# Patient Record
Sex: FEMALE | Race: BLACK OR AFRICAN AMERICAN | NOT HISPANIC OR LATINO | Employment: OTHER | URBAN - METROPOLITAN AREA
[De-identification: names, ages, dates, MRNs, and addresses within clinical notes are randomized per-mention and may not be internally consistent; named-entity substitution may affect disease eponyms.]

---

## 2021-08-13 ENCOUNTER — APPOINTMENT (OUTPATIENT)
Dept: GENERAL RADIOLOGY | Facility: HOSPITAL | Age: 71
End: 2021-08-13

## 2021-08-13 ENCOUNTER — HOSPITAL ENCOUNTER (EMERGENCY)
Facility: HOSPITAL | Age: 71
Discharge: HOME | End: 2021-08-13
Attending: EMERGENCY MEDICINE | Admitting: EMERGENCY MEDICINE

## 2021-08-13 VITALS
SYSTOLIC BLOOD PRESSURE: 133 MMHG | OXYGEN SATURATION: 94 % | WEIGHT: 185.85 LBS | HEART RATE: 74 BPM | TEMPERATURE: 97.3 F | BODY MASS INDEX: 29.17 KG/M2 | HEIGHT: 67 IN | RESPIRATION RATE: 18 BRPM | DIASTOLIC BLOOD PRESSURE: 74 MMHG

## 2021-08-13 DIAGNOSIS — M47.816 FACET ARTHROPATHY, LUMBAR: ICD-10-CM

## 2021-08-13 DIAGNOSIS — M15.9 PRIMARY OSTEOARTHRITIS INVOLVING MULTIPLE JOINTS: Primary | ICD-10-CM

## 2021-08-13 DIAGNOSIS — G89.29 CHRONIC LEFT HIP PAIN: ICD-10-CM

## 2021-08-13 DIAGNOSIS — G89.29 CHRONIC BILATERAL LOW BACK PAIN WITHOUT SCIATICA: ICD-10-CM

## 2021-08-13 DIAGNOSIS — M16.0 PRIMARY OSTEOARTHRITIS OF BOTH HIPS: ICD-10-CM

## 2021-08-13 DIAGNOSIS — M54.50 CHRONIC BILATERAL LOW BACK PAIN WITHOUT SCIATICA: ICD-10-CM

## 2021-08-13 DIAGNOSIS — M25.552 CHRONIC LEFT HIP PAIN: ICD-10-CM

## 2021-08-13 LAB
ALBUMIN SERPL-MCNC: 4.5 G/DL (ref 3.5–5.2)
ALBUMIN/GLOB SERPL: 1.3 G/DL
ALP SERPL-CCNC: 78 U/L (ref 39–117)
ALT SERPL W P-5'-P-CCNC: 14 U/L (ref 1–33)
ANION GAP SERPL CALCULATED.3IONS-SCNC: 11.9 MMOL/L (ref 5–15)
AST SERPL-CCNC: 17 U/L (ref 1–32)
BASOPHILS # BLD AUTO: 0.04 10*3/MM3 (ref 0–0.2)
BASOPHILS NFR BLD AUTO: 0.6 % (ref 0–1.5)
BILIRUB SERPL-MCNC: 0.3 MG/DL (ref 0–1.2)
BILIRUB UR QL STRIP: NEGATIVE
BUN SERPL-MCNC: 11 MG/DL (ref 8–23)
BUN/CREAT SERPL: 14.5 (ref 7–25)
CALCIUM SPEC-SCNC: 9.7 MG/DL (ref 8.6–10.5)
CHLORIDE SERPL-SCNC: 107 MMOL/L (ref 98–107)
CLARITY UR: CLEAR
CO2 SERPL-SCNC: 25.1 MMOL/L (ref 22–29)
COLOR UR: YELLOW
CREAT SERPL-MCNC: 0.76 MG/DL (ref 0.57–1)
DEPRECATED RDW RBC AUTO: 46.5 FL (ref 37–54)
EOSINOPHIL # BLD AUTO: 0.15 10*3/MM3 (ref 0–0.4)
EOSINOPHIL NFR BLD AUTO: 2.2 % (ref 0.3–6.2)
ERYTHROCYTE [DISTWIDTH] IN BLOOD BY AUTOMATED COUNT: 15.5 % (ref 12.3–15.4)
GFR SERPL CREATININE-BSD FRML MDRD: 91 ML/MIN/1.73
GLOBULIN UR ELPH-MCNC: 3.4 GM/DL
GLUCOSE SERPL-MCNC: 84 MG/DL (ref 65–99)
GLUCOSE UR STRIP-MCNC: NEGATIVE MG/DL
HCT VFR BLD AUTO: 42.7 % (ref 34–46.6)
HGB BLD-MCNC: 13.9 G/DL (ref 12–15.9)
HGB UR QL STRIP.AUTO: NEGATIVE
IMM GRANULOCYTES # BLD AUTO: 0.01 10*3/MM3 (ref 0–0.05)
IMM GRANULOCYTES NFR BLD AUTO: 0.1 % (ref 0–0.5)
KETONES UR QL STRIP: NEGATIVE
LEUKOCYTE ESTERASE UR QL STRIP.AUTO: NEGATIVE
LYMPHOCYTES # BLD AUTO: 2.68 10*3/MM3 (ref 0.7–3.1)
LYMPHOCYTES NFR BLD AUTO: 39.6 % (ref 19.6–45.3)
MCH RBC QN AUTO: 27 PG (ref 26.6–33)
MCHC RBC AUTO-ENTMCNC: 32.6 G/DL (ref 31.5–35.7)
MCV RBC AUTO: 83.1 FL (ref 79–97)
MONOCYTES # BLD AUTO: 0.53 10*3/MM3 (ref 0.1–0.9)
MONOCYTES NFR BLD AUTO: 7.8 % (ref 5–12)
NEUTROPHILS NFR BLD AUTO: 3.35 10*3/MM3 (ref 1.7–7)
NEUTROPHILS NFR BLD AUTO: 49.7 % (ref 42.7–76)
NITRITE UR QL STRIP: NEGATIVE
NRBC BLD AUTO-RTO: 0 /100 WBC (ref 0–0.2)
PH UR STRIP.AUTO: 7 [PH] (ref 5–8)
PLATELET # BLD AUTO: 239 10*3/MM3 (ref 140–450)
PMV BLD AUTO: 11.9 FL (ref 6–12)
POTASSIUM SERPL-SCNC: 3.5 MMOL/L (ref 3.5–5.2)
PROT SERPL-MCNC: 7.9 G/DL (ref 6–8.5)
PROT UR QL STRIP: NEGATIVE
RBC # BLD AUTO: 5.14 10*6/MM3 (ref 3.77–5.28)
SODIUM SERPL-SCNC: 144 MMOL/L (ref 136–145)
SP GR UR STRIP: 1.01 (ref 1–1.03)
UROBILINOGEN UR QL STRIP: NORMAL
WBC # BLD AUTO: 6.76 10*3/MM3 (ref 3.4–10.8)

## 2021-08-13 PROCEDURE — 80053 COMPREHEN METABOLIC PANEL: CPT | Performed by: EMERGENCY MEDICINE

## 2021-08-13 PROCEDURE — 73562 X-RAY EXAM OF KNEE 3: CPT

## 2021-08-13 PROCEDURE — 99283 EMERGENCY DEPT VISIT LOW MDM: CPT

## 2021-08-13 PROCEDURE — 96361 HYDRATE IV INFUSION ADD-ON: CPT

## 2021-08-13 PROCEDURE — 72110 X-RAY EXAM L-2 SPINE 4/>VWS: CPT

## 2021-08-13 PROCEDURE — 81003 URINALYSIS AUTO W/O SCOPE: CPT | Performed by: NURSE PRACTITIONER

## 2021-08-13 PROCEDURE — 71045 X-RAY EXAM CHEST 1 VIEW: CPT

## 2021-08-13 PROCEDURE — 85025 COMPLETE CBC W/AUTO DIFF WBC: CPT | Performed by: EMERGENCY MEDICINE

## 2021-08-13 PROCEDURE — 73502 X-RAY EXAM HIP UNI 2-3 VIEWS: CPT

## 2021-08-13 PROCEDURE — 96375 TX/PRO/DX INJ NEW DRUG ADDON: CPT

## 2021-08-13 PROCEDURE — 96374 THER/PROPH/DIAG INJ IV PUSH: CPT

## 2021-08-13 PROCEDURE — 25010000002 KETOROLAC TROMETHAMINE PER 15 MG: Performed by: EMERGENCY MEDICINE

## 2021-08-13 PROCEDURE — 25010000002 DEXAMETHASONE PER 1 MG: Performed by: NURSE PRACTITIONER

## 2021-08-13 RX ORDER — SODIUM CHLORIDE 0.9 % (FLUSH) 0.9 %
10 SYRINGE (ML) INJECTION AS NEEDED
Status: DISCONTINUED | OUTPATIENT
Start: 2021-08-13 | End: 2021-08-13 | Stop reason: HOSPADM

## 2021-08-13 RX ORDER — ASCORBIC ACID 500 MG
500 TABLET ORAL
COMMUNITY

## 2021-08-13 RX ORDER — ONDANSETRON 2 MG/ML
4 INJECTION INTRAMUSCULAR; INTRAVENOUS ONCE
Status: DISCONTINUED | OUTPATIENT
Start: 2021-08-13 | End: 2021-08-13 | Stop reason: HOSPADM

## 2021-08-13 RX ORDER — PREDNISONE 20 MG/1
TABLET ORAL
Qty: 18 TABLET | Refills: 0 | Status: SHIPPED | OUTPATIENT
Start: 2021-08-13 | End: 2021-08-22

## 2021-08-13 RX ORDER — DIPHENOXYLATE HYDROCHLORIDE AND ATROPINE SULFATE 2.5; .025 MG/1; MG/1
1 TABLET ORAL
COMMUNITY

## 2021-08-13 RX ORDER — DEXAMETHASONE SODIUM PHOSPHATE 10 MG/ML
10 INJECTION INTRAMUSCULAR; INTRAVENOUS ONCE
Status: COMPLETED | OUTPATIENT
Start: 2021-08-13 | End: 2021-08-13

## 2021-08-13 RX ORDER — KETOROLAC TROMETHAMINE 15 MG/ML
15 INJECTION, SOLUTION INTRAMUSCULAR; INTRAVENOUS ONCE
Status: COMPLETED | OUTPATIENT
Start: 2021-08-13 | End: 2021-08-13

## 2021-08-13 RX ADMIN — DEXAMETHASONE SODIUM PHOSPHATE 10 MG: 10 INJECTION INTRAMUSCULAR; INTRAVENOUS at 14:59

## 2021-08-13 RX ADMIN — KETOROLAC TROMETHAMINE 15 MG: 15 INJECTION, SOLUTION INTRAMUSCULAR; INTRAVENOUS at 15:00

## 2021-08-13 RX ADMIN — SODIUM CHLORIDE 1000 ML: 9 INJECTION, SOLUTION INTRAVENOUS at 13:15

## 2021-08-13 NOTE — ED NOTES
"Pt refused pain medications at this time. States \"I was in a bad position during those pictures and I really don't want to take them if I don't have to.\"      Donna Field RN  08/13/21 3334    "

## 2021-08-13 NOTE — ED PROVIDER NOTES
Subjective   Pt complaining of worsening lower back pain and left hip pain and bilateral knee swelling and pain. She is here visiting with her daughter from [place], but states she is not going back. She takes no medications.      History provided by:  Patient   used: No        Review of Systems   Constitutional: Negative.    HENT: Negative.    Eyes: Negative.    Respiratory: Negative.    Cardiovascular: Negative.    Gastrointestinal: Negative.    Endocrine: Negative.    Genitourinary: Negative.    Musculoskeletal: Positive for arthralgias, back pain and joint swelling.   Skin: Negative.    Allergic/Immunologic: Negative.    Neurological: Negative.    Hematological: Negative.    Psychiatric/Behavioral: Negative.        Past Medical History:   Diagnosis Date   • Colitis        No Known Allergies    History reviewed. No pertinent surgical history.    History reviewed. No pertinent family history.    Social History     Socioeconomic History   • Marital status:      Spouse name: Not on file   • Number of children: Not on file   • Years of education: Not on file   • Highest education level: Not on file   Tobacco Use   • Smoking status: Never Smoker   • Smokeless tobacco: Never Used   Vaping Use   • Vaping Use: Never used   Substance and Sexual Activity   • Alcohol use: Not Currently   • Drug use: Not Currently           Objective   Physical Exam  Constitutional:       Appearance: Normal appearance.   HENT:      Head: Normocephalic and atraumatic.      Nose: Nose normal.      Mouth/Throat:      Mouth: Mucous membranes are moist.   Eyes:      Pupils: Pupils are equal, round, and reactive to light.   Cardiovascular:      Rate and Rhythm: Normal rate and regular rhythm.      Pulses: Normal pulses.   Pulmonary:      Effort: Pulmonary effort is normal.      Breath sounds: Normal breath sounds.   Abdominal:      General: Abdomen is flat. Bowel sounds are normal.      Palpations: Abdomen is soft.    Musculoskeletal:      Cervical back: Normal range of motion.        Back:         Legs:    Skin:     General: Skin is warm and dry.      Capillary Refill: Capillary refill takes less than 2 seconds.   Neurological:      General: No focal deficit present.      Mental Status: She is alert and oriented to person, place, and time. Mental status is at baseline.   Psychiatric:         Mood and Affect: Mood normal.         Procedures           ED Course  ED Course as of Aug 13 1831   Fri Aug 13, 2021   1447 XR Spine Lumbar Complete 4+VW [TP]      ED Course User Index  [TP] Donna Card APRN                                           MDM  Number of Diagnoses or Management Options  Chronic bilateral low back pain without sciatica: minor  Chronic left hip pain: minor  Facet arthropathy, lumbar: minor  Primary osteoarthritis involving multiple joints: minor  Primary osteoarthritis of both hips: minor     Amount and/or Complexity of Data Reviewed  Clinical lab tests: reviewed and ordered  Tests in the radiology section of CPT®: reviewed and ordered    Risk of Complications, Morbidity, and/or Mortality  Presenting problems: low  Diagnostic procedures: low  Management options: low    Patient Progress  Patient progress: stable      Final diagnoses:   Primary osteoarthritis involving multiple joints   Primary osteoarthritis of both hips   Facet arthropathy, lumbar   Chronic left hip pain   Chronic bilateral low back pain without sciatica       ED Disposition  ED Disposition     ED Disposition Condition Comment    Discharge Stable           Provider, No Known  [facility]  [place] [address]      As needed         Medication List      New Prescriptions    predniSONE 20 MG tablet  Commonly known as: DELTASONE  Take 1 tablet by mouth 3 (Three) Times a Day With Meals for 3 days, THEN 1 tablet 2 (Two) Times a Day for 3 days, THEN 1 tablet Daily for 3 days.  Start taking on: August 13, 2021           Where to Get  Your Medications      These medications were sent to [facility] [id number] - [place], [place] - [address] [facility] - [phone]  - [phone]  [address] [facility] [place] [address]    Phone: [phone]   · predniSONE 20 MG tablet          Donna Card, APRN  08/13/21 0626

## 2021-10-07 ENCOUNTER — HOSPITAL ENCOUNTER (OUTPATIENT)
Dept: GENERAL RADIOLOGY | Facility: HOSPITAL | Age: 71
Discharge: HOME | End: 2021-10-07
Admitting: NURSE PRACTITIONER

## 2021-10-07 ENCOUNTER — OFFICE VISIT (OUTPATIENT)
Dept: INTERNAL MEDICINE | Facility: CLINIC | Age: 71
End: 2021-10-07

## 2021-10-07 VITALS
TEMPERATURE: 97.1 F | WEIGHT: 184.38 LBS | DIASTOLIC BLOOD PRESSURE: 96 MMHG | SYSTOLIC BLOOD PRESSURE: 142 MMHG | HEART RATE: 107 BPM | HEIGHT: 67 IN | BODY MASS INDEX: 28.94 KG/M2 | OXYGEN SATURATION: 98 %

## 2021-10-07 DIAGNOSIS — K51.919 ULCERATIVE COLITIS WITH COMPLICATION, UNSPECIFIED LOCATION (HCC): ICD-10-CM

## 2021-10-07 DIAGNOSIS — Z12.11 COLON CANCER SCREENING: ICD-10-CM

## 2021-10-07 DIAGNOSIS — Z13.220 SCREENING FOR LIPID DISORDERS: ICD-10-CM

## 2021-10-07 DIAGNOSIS — Z12.31 ENCOUNTER FOR SCREENING MAMMOGRAM FOR MALIGNANT NEOPLASM OF BREAST: ICD-10-CM

## 2021-10-07 DIAGNOSIS — E66.3 OVERWEIGHT (BMI 25.0-29.9): Chronic | ICD-10-CM

## 2021-10-07 DIAGNOSIS — M25.50 ARTHRALGIA, UNSPECIFIED JOINT: ICD-10-CM

## 2021-10-07 DIAGNOSIS — M25.531 RIGHT WRIST PAIN: ICD-10-CM

## 2021-10-07 DIAGNOSIS — Z12.73 SCREENING FOR OVARIAN CANCER: ICD-10-CM

## 2021-10-07 DIAGNOSIS — I10 PRIMARY HYPERTENSION: Chronic | ICD-10-CM

## 2021-10-07 DIAGNOSIS — K51.919 ULCERATIVE COLITIS WITH COMPLICATION, UNSPECIFIED LOCATION (HCC): Primary | ICD-10-CM

## 2021-10-07 DIAGNOSIS — Z78.0 POSTMENOPAUSE: ICD-10-CM

## 2021-10-07 PROBLEM — K51.90 ULCERATIVE COLITIS: Chronic | Status: ACTIVE | Noted: 2019-08-27

## 2021-10-07 PROBLEM — K51.90 ULCERATIVE COLITIS (HCC): Status: ACTIVE | Noted: 2019-08-27

## 2021-10-07 LAB
ALBUMIN SERPL-MCNC: 4.5 G/DL (ref 3.5–5.2)
ALBUMIN/GLOB SERPL: 1.7 G/DL
ALP SERPL-CCNC: 74 U/L (ref 39–117)
ALT SERPL W P-5'-P-CCNC: 22 U/L (ref 1–33)
ANION GAP SERPL CALCULATED.3IONS-SCNC: 10.4 MMOL/L (ref 5–15)
AST SERPL-CCNC: 21 U/L (ref 1–32)
BASOPHILS # BLD MANUAL: 0.07 10*3/MM3 (ref 0–0.2)
BASOPHILS NFR BLD AUTO: 1 % (ref 0–1.5)
BILIRUB SERPL-MCNC: 0.3 MG/DL (ref 0–1.2)
BUN SERPL-MCNC: 12 MG/DL (ref 8–23)
BUN/CREAT SERPL: 14.6 (ref 7–25)
CALCIUM SPEC-SCNC: 9.4 MG/DL (ref 8.6–10.5)
CHLORIDE SERPL-SCNC: 107 MMOL/L (ref 98–107)
CHOLEST SERPL-MCNC: 264 MG/DL (ref 0–200)
CHROMATIN AB SERPL-ACNC: <10 IU/ML (ref 0–14)
CO2 SERPL-SCNC: 25.6 MMOL/L (ref 22–29)
CREAT SERPL-MCNC: 0.82 MG/DL (ref 0.57–1)
CRP SERPL-MCNC: <0.3 MG/DL (ref 0–0.5)
DEPRECATED RDW RBC AUTO: 42.8 FL (ref 37–54)
EOSINOPHIL # BLD MANUAL: 0.2 10*3/MM3 (ref 0–0.4)
EOSINOPHIL NFR BLD MANUAL: 3 % (ref 0.3–6.2)
ERYTHROCYTE [DISTWIDTH] IN BLOOD BY AUTOMATED COUNT: 14.4 % (ref 12.3–15.4)
ERYTHROCYTE [SEDIMENTATION RATE] IN BLOOD: 31 MM/HR (ref 0–30)
GFR SERPL CREATININE-BSD FRML MDRD: 83 ML/MIN/1.73
GLOBULIN UR ELPH-MCNC: 2.6 GM/DL
GLUCOSE SERPL-MCNC: 85 MG/DL (ref 65–99)
HCT VFR BLD AUTO: 43.9 % (ref 34–46.6)
HDLC SERPL-MCNC: 51 MG/DL (ref 40–60)
HGB BLD-MCNC: 14.3 G/DL (ref 12–15.9)
LDLC SERPL CALC-MCNC: 194 MG/DL (ref 0–100)
LDLC/HDLC SERPL: 3.76 {RATIO}
LYMPHOCYTES # BLD MANUAL: 2.95 10*3/MM3 (ref 0.7–3.1)
LYMPHOCYTES NFR BLD MANUAL: 44.4 % (ref 19.6–45.3)
LYMPHOCYTES NFR BLD MANUAL: 5.1 % (ref 5–12)
MCH RBC QN AUTO: 26.7 PG (ref 26.6–33)
MCHC RBC AUTO-ENTMCNC: 32.6 G/DL (ref 31.5–35.7)
MCV RBC AUTO: 82.1 FL (ref 79–97)
MONOCYTES # BLD AUTO: 0.34 10*3/MM3 (ref 0.1–0.9)
NEUTROPHILS # BLD AUTO: 3.09 10*3/MM3 (ref 1.7–7)
NEUTROPHILS NFR BLD MANUAL: 46.5 % (ref 42.7–76)
PLAT MORPH BLD: NORMAL
PLATELET # BLD AUTO: 148 10*3/MM3 (ref 140–450)
PMV BLD AUTO: 13.1 FL (ref 6–12)
POTASSIUM SERPL-SCNC: 3.8 MMOL/L (ref 3.5–5.2)
PROT SERPL-MCNC: 7.1 G/DL (ref 6–8.5)
RBC # BLD AUTO: 5.35 10*6/MM3 (ref 3.77–5.28)
RBC MORPH BLD: NORMAL
SODIUM SERPL-SCNC: 143 MMOL/L (ref 136–145)
TRIGL SERPL-MCNC: 106 MG/DL (ref 0–150)
TSH SERPL DL<=0.05 MIU/L-ACNC: 2.42 UIU/ML (ref 0.27–4.2)
VLDLC SERPL-MCNC: 19 MG/DL (ref 5–40)
WBC # BLD AUTO: 6.65 10*3/MM3 (ref 3.4–10.8)
WBC MORPH BLD: NORMAL

## 2021-10-07 PROCEDURE — 80053 COMPREHEN METABOLIC PANEL: CPT | Performed by: NURSE PRACTITIONER

## 2021-10-07 PROCEDURE — 36415 COLL VENOUS BLD VENIPUNCTURE: CPT | Performed by: NURSE PRACTITIONER

## 2021-10-07 PROCEDURE — 86140 C-REACTIVE PROTEIN: CPT | Performed by: NURSE PRACTITIONER

## 2021-10-07 PROCEDURE — 99204 OFFICE O/P NEW MOD 45 MIN: CPT | Performed by: NURSE PRACTITIONER

## 2021-10-07 PROCEDURE — 85007 BL SMEAR W/DIFF WBC COUNT: CPT | Performed by: NURSE PRACTITIONER

## 2021-10-07 PROCEDURE — 86038 ANTINUCLEAR ANTIBODIES: CPT | Performed by: NURSE PRACTITIONER

## 2021-10-07 PROCEDURE — 86225 DNA ANTIBODY NATIVE: CPT | Performed by: NURSE PRACTITIONER

## 2021-10-07 PROCEDURE — 85652 RBC SED RATE AUTOMATED: CPT | Performed by: NURSE PRACTITIONER

## 2021-10-07 PROCEDURE — 86431 RHEUMATOID FACTOR QUANT: CPT | Performed by: NURSE PRACTITIONER

## 2021-10-07 PROCEDURE — 73110 X-RAY EXAM OF WRIST: CPT

## 2021-10-07 PROCEDURE — 84443 ASSAY THYROID STIM HORMONE: CPT | Performed by: NURSE PRACTITIONER

## 2021-10-07 PROCEDURE — 85025 COMPLETE CBC W/AUTO DIFF WBC: CPT | Performed by: NURSE PRACTITIONER

## 2021-10-07 PROCEDURE — 80061 LIPID PANEL: CPT | Performed by: NURSE PRACTITIONER

## 2021-10-07 NOTE — PROGRESS NOTES
"Chief Complaint  Arthritis and Establish Care (Previous PCP: nathan obando in [place])    Subjective          Татьяна Jackson presents to [facility]  History of Present Illness  She states that she does not like to take medications. She prefers natural remedies only.   She reports having UC for many years. She was taking medications but quit about 15 yrs ago. She reports abdominal pain and bloating only at this time. She denies rectal bleeding.  Arthritis--right hand/thumb pain x2-3 mths, fall at that time, snuff box tenderness  She reports having issues with HTN a few yrs ago. She reports refusing medication at that time. She reports changing diet and drinking a mixture of juices which helped.  Denies chest pain, headache, dizziness  She also reports about a 60 pound weight gain over the last 15 years.  Objective   Vital Signs:   /96   Pulse 107   Temp 97.1 °F (36.2 °C) (Temporal)   Ht 170.2 cm (67\")   Wt 83.6 kg (184 lb 6 oz)   SpO2 98%   BMI 28.88 kg/m²     Physical Exam  Vitals and nursing note reviewed.   Constitutional:       General: She is not in acute distress.     Appearance: Normal appearance.   HENT:      Head: Normocephalic and atraumatic.      Right Ear: Tympanic membrane and external ear normal.      Left Ear: Tympanic membrane and external ear normal.      Nose: Nose normal.      Mouth/Throat:      Mouth: Mucous membranes are moist.   Eyes:      Conjunctiva/sclera: Conjunctivae normal.   Cardiovascular:      Rate and Rhythm: Normal rate and regular rhythm.      Pulses: Normal pulses.      Heart sounds: Normal heart sounds. No murmur heard.   No friction rub. No gallop.    Pulmonary:      Effort: Pulmonary effort is normal. No respiratory distress.      Breath sounds: No wheezing, rhonchi or rales.   Abdominal:      Palpations: Abdomen is soft.   Musculoskeletal:      Cervical back: Neck supple.      Right lower leg: No edema.      Left lower " leg: No edema.   Skin:     General: Skin is warm and dry.   Neurological:      General: No focal deficit present.      Mental Status: She is alert and oriented to person, place, and time.   Psychiatric:         Mood and Affect: Mood normal.         Behavior: Behavior normal.        Result Review :     CMP    CMP 8/13/21   Glucose 84   BUN 11   Creatinine 0.76   eGFR African Am 91   Sodium 144   Potassium 3.5   Chloride 107   Calcium 9.7   Albumin 4.50   Total Bilirubin 0.3   Alkaline Phosphatase 78   AST (SGOT) 17   ALT (SGPT) 14           CBC    CBC 8/13/21   WBC 6.76   RBC 5.14   Hemoglobin 13.9   Hematocrit 42.7   MCV 83.1   MCH 27.0   MCHC 32.6   RDW 15.5 (A)   Platelets 239   (A) Abnormal value                   Procedures      Assessment and Plan    Diagnoses and all orders for this visit:    1. Ulcerative colitis with complication, unspecified location (HCC) (Primary)  Assessment & Plan:  We will schedule with GI for further eval    Orders:  -     Ambulatory Referral to Gastroenterology  -     XR Wrist 3+ View Right; Future  -     Comprehensive Metabolic Panel  -     CBC & Differential  -     TSH  -     Lipid Panel  -     Sedimentation Rate  -     C-reactive Protein  -     Rheumatoid Factor  -     LEXI    2. Colon cancer screening  -     Ambulatory Referral to Gastroenterology  -     XR Wrist 3+ View Right; Future  -     Comprehensive Metabolic Panel  -     CBC & Differential  -     TSH  -     Lipid Panel  -     Sedimentation Rate  -     C-reactive Protein  -     Rheumatoid Factor  -     LEXI    3. Right wrist pain  -     Ambulatory Referral to Gastroenterology  -     XR Wrist 3+ View Right; Future  -     Comprehensive Metabolic Panel  -     CBC & Differential  -     TSH  -     Lipid Panel  -     Sedimentation Rate  -     C-reactive Protein  -     Rheumatoid Factor  -     LEXI    4. Arthralgia, unspecified joint  Assessment & Plan:  Sending her for x-ray of right wrist given injury with ongoing pain and  swelling.  We will also check labs for other causes of autoimmune-type arthritis    Orders:  -     Ambulatory Referral to Gastroenterology  -     XR Wrist 3+ View Right; Future  -     Comprehensive Metabolic Panel  -     CBC & Differential  -     TSH  -     Lipid Panel  -     Sedimentation Rate  -     C-reactive Protein  -     Rheumatoid Factor  -     LEXI    5. Encounter for screening mammogram for malignant neoplasm of breast  -     Mammo Screening Digital Tomosynthesis Bilateral With CAD; Future    6. Screening for ovarian cancer  -     Ambulatory Referral to Gynecology    7. Postmenopause  -     DEXA Bone Density Axial; Future    8. Screening for lipid disorders  -     TSH  -     Lipid Panel    9. Primary hypertension  Assessment & Plan:  She will begin checking her blood pressure routinely and keep a log to discuss at follow-up.  She will call if blood pressure readings are greater than 130/80.  Discussed elevated blood pressure at today's visit.  Discussed risks of blood pressure elevation including death, heart attack, stroke, chronic kidney disease, blindness.  Follow a low-salt diet and increase exercise.  Discussed that she may need to start a medication in the future if we are unable to get this down with lifestyle modification.  Go to ER with chest pain, palpitations, vision loss, unilateral weakness, or altered mental status.  Patient verbalized understanding      10. Overweight (BMI 25.0-29.9)            Follow Up   Return in about 3 months (around 1/7/2022).  Patient was given instructions and counseling regarding her condition or for health maintenance advice. Please see specific information pulled into the AVS if appropriate.

## 2021-10-07 NOTE — ASSESSMENT & PLAN NOTE
She will begin checking her blood pressure routinely and keep a log to discuss at follow-up.  She will call if blood pressure readings are greater than 130/80.  Discussed elevated blood pressure at today's visit.  Discussed risks of blood pressure elevation including death, heart attack, stroke, chronic kidney disease, blindness.  Follow a low-salt diet and increase exercise.  Discussed that she may need to start a medication in the future if we are unable to get this down with lifestyle modification.  Go to ER with chest pain, palpitations, vision loss, unilateral weakness, or altered mental status.  Patient verbalized understanding

## 2021-10-07 NOTE — PATIENT INSTRUCTIONS
"https://www.nhlbi.nih.gov/files/docs/public/heart/dash_brief.pdf\">   DASH Eating Plan  DASH stands for Dietary Approaches to Stop Hypertension. The DASH eating plan is a healthy eating plan that has been shown to:  · Reduce high blood pressure (hypertension).  · Reduce your risk for type 2 diabetes, heart disease, and stroke.  · Help with weight loss.  What are tips for following this plan?  Reading food labels  · Check food labels for the amount of salt (sodium) per serving. Choose foods with less than 5 percent of the Daily Value of sodium. Generally, foods with less than 300 milligrams (mg) of sodium per serving fit into this eating plan.  · To find whole grains, look for the word \"whole\" as the first word in the ingredient list.  Shopping  · Buy products labeled as \"low-sodium\" or \"no salt added.\"  · Buy fresh foods. Avoid canned foods and pre-made or frozen meals.  Cooking  · Avoid adding salt when cooking. Use salt-free seasonings or herbs instead of table salt or sea salt. Check with your health care provider or pharmacist before using salt substitutes.  · Do not macario foods. Cook foods using healthy methods such as baking, boiling, grilling, roasting, and broiling instead.  · Cook with heart-healthy oils, such as olive, canola, avocado, soybean, or sunflower oil.  Meal planning    · Eat a balanced diet that includes:  ? 4 or more servings of fruits and 4 or more servings of vegetables each day. Try to fill one-half of your plate with fruits and vegetables.  ? 6-8 servings of whole grains each day.  ? Less than 6 oz (170 g) of lean meat, poultry, or fish each day. A 3-oz (85-g) serving of meat is about the same size as a deck of cards. One egg equals 1 oz (28 g).  ? 2-3 servings of low-fat dairy each day. One serving is 1 cup (237 mL).  ? 1 serving of nuts, seeds, or beans 5 times each week.  ? 2-3 servings of heart-healthy fats. Healthy fats called omega-3 fatty acids are found in foods such as walnuts, " flaxseeds, fortified milks, and eggs. These fats are also found in cold-water fish, such as sardines, salmon, and mackerel.  · Limit how much you eat of:  ? Canned or prepackaged foods.  ? Food that is high in trans fat, such as some fried foods.  ? Food that is high in saturated fat, such as fatty meat.  ? Desserts and other sweets, sugary drinks, and other foods with added sugar.  ? Full-fat dairy products.  · Do not salt foods before eating.  · Do not eat more than 4 egg yolks a week.  · Try to eat at least 2 vegetarian meals a week.  · Eat more home-cooked food and less restaurant, buffet, and fast food.    Lifestyle  · When eating at a restaurant, ask that your food be prepared with less salt or no salt, if possible.  · If you drink alcohol:  ? Limit how much you use to:  § 0-1 drink a day for women who are not pregnant.  § 0-2 drinks a day for men.  ? Be aware of how much alcohol is in your drink. In the [place] one drink equals one 12 oz bottle of beer (355 mL), one 5 oz glass of wine (148 mL), or one 1½ oz glass of hard liquor (44 mL).  General information  · Avoid eating more than 2,300 mg of salt a day. If you have hypertension, you may need to reduce your sodium intake to 1,500 mg a day.  · Work with your health care provider to maintain a healthy body weight or to lose weight. Ask what an ideal weight is for you.  · Get at least 30 minutes of exercise that causes your heart to beat faster (aerobic exercise) most days of the week. Activities may include walking, swimming, or biking.  · Work with your health care provider or dietitian to adjust your eating plan to your individual calorie needs.  What foods should I eat?  Fruits  All fresh, dried, or frozen fruit. Canned fruit in natural juice (without added sugar).  Vegetables  Fresh or frozen vegetables (raw, steamed, roasted, or grilled). Low-sodium or reduced-sodium tomato and vegetable juice. Low-sodium or reduced-sodium tomato sauce and tomato paste.  Low-sodium or reduced-sodium canned vegetables.  Grains  Whole-grain or whole-wheat bread. Whole-grain or whole-wheat pasta. Brown rice. Oatmeal. Quinoa. Bulgur. Whole-grain and low-sodium cereals. Cami bread. Low-fat, low-sodium crackers. Whole-wheat flour tortillas.  Meats and other proteins  Skinless chicken or turkey. Ground chicken or turkey. Pork with fat trimmed off. Fish and seafood. Egg whites. Dried beans, peas, or lentils. Unsalted nuts, nut butters, and seeds. Unsalted canned beans. Lean cuts of beef with fat trimmed off. Low-sodium, lean precooked or cured meat, such as sausages or meat loaves.  Dairy  Low-fat (1%) or fat-free (skim) milk. Reduced-fat, low-fat, or fat-free cheeses. Nonfat, low-sodium ricotta or cottage cheese. Low-fat or nonfat yogurt. Low-fat, low-sodium cheese.  Fats and oils  Soft margarine without trans fats. Vegetable oil. Reduced-fat, low-fat, or light mayonnaise and salad dressings (reduced-sodium). Canola, safflower, olive, avocado, soybean, and sunflower oils. Avocado.  Seasonings and condiments  Herbs. Spices. Seasoning mixes without salt.  Other foods  Unsalted popcorn and pretzels. Fat-free sweets.  The items listed above may not be a complete list of foods and beverages you can eat. Contact a dietitian for more information.  What foods should I avoid?  Fruits  Canned fruit in a light or heavy syrup. Fried fruit. Fruit in cream or butter sauce.  Vegetables  Creamed or fried vegetables. Vegetables in a cheese sauce. Regular canned vegetables (not low-sodium or reduced-sodium). Regular canned tomato sauce and paste (not low-sodium or reduced-sodium). Regular tomato and vegetable juice (not low-sodium or reduced-sodium). Pickles. Olives.  Grains  Baked goods made with fat, such as croissants, muffins, or some breads. Dry pasta or rice meal packs.  Meats and other proteins  Fatty cuts of meat. Ribs. Fried meat. Weaver. Bologna, salami, and other precooked or cured meats, such as  sausages or meat loaves. Fat from the back of a pig (fatback). Bratwurst. Salted nuts and seeds. Canned beans with added salt. Canned or smoked fish. Whole eggs or egg yolks. Chicken or turkey with skin.  Dairy  Whole or 2% milk, cream, and half-and-half. Whole or full-fat cream cheese. Whole-fat or sweetened yogurt. Full-fat cheese. Nondairy creamers. Whipped toppings. Processed cheese and cheese spreads.  Fats and oils  Butter. Stick margarine. Lard. Shortening. Ghee. Weaver fat. Tropical oils, such as coconut, palm kernel, or palm oil.  Seasonings and condiments  Onion salt, garlic salt, seasoned salt, table salt, and sea salt. Worcestershire sauce. Tartar sauce. Barbecue sauce. Teriyaki sauce. Soy sauce, including reduced-sodium. Steak sauce. Canned and packaged gravies. Fish sauce. Oyster sauce. Cocktail sauce. Store-bought horseradish. Ketchup. Mustard. Meat flavorings and tenderizers. Bouillon cubes. Hot sauces. Pre-made or packaged marinades. Pre-made or packaged taco seasonings. Relishes. Regular salad dressings.  Other foods  Salted popcorn and pretzels.  The items listed above may not be a complete list of foods and beverages you should avoid. Contact a dietitian for more information.  Where to find more information  · National Heart, Lung, and Blood [employer]: www.nhlbi.nih.gov  · American Heart Association: www.heart.org  · Academy of Nutrition and Dietetics: www.eatright.org  · National Kidney Foundation: www.kidney.org  Summary  · The DASH eating plan is a healthy eating plan that has been shown to reduce high blood pressure (hypertension). It may also reduce your risk for type 2 diabetes, heart disease, and stroke.  · When on the DASH eating plan, aim to eat more fresh fruits and vegetables, whole grains, lean proteins, low-fat dairy, and heart-healthy fats.  · With the DASH eating plan, you should limit salt (sodium) intake to 2,300 mg a day. If you have hypertension, you may need to reduce your  sodium intake to 1,500 mg a day.  · Work with your health care provider or dietitian to adjust your eating plan to your individual calorie needs.  This information is not intended to replace advice given to you by your health care provider. Make sure you discuss any questions you have with your health care provider.  Document Revised: 11/20/2020 Document Reviewed: 11/20/2020  [employer] Patient Education © 2021 [employer]    Hypertension, Adult  Hypertension is another name for high blood pressure. High blood pressure forces your heart to work harder to pump blood. This can cause problems over time.  There are two numbers in a blood pressure reading. There is a top number (systolic) over a bottom number (diastolic). It is best to have a blood pressure that is below 120/80. Healthy choices can help lower your blood pressure, or you may need medicine to help lower it.  What are the causes?  The cause of this condition is not known. Some conditions may be related to high blood pressure.  What increases the risk?  · Smoking.  · Having type 2 diabetes mellitus, high cholesterol, or both.  · Not getting enough exercise or physical activity.  · Being overweight.  · Having too much fat, sugar, calories, or salt (sodium) in your diet.  · Drinking too much alcohol.  · Having long-term (chronic) kidney disease.  · Having a family history of high blood pressure.  · Age. Risk increases with age.  · Race. You may be at higher risk if you are .  · Gender. Men are at higher risk than women before age 45. After age 65, women are at higher risk than men.  · Having obstructive sleep apnea.  · Stress.  What are the signs or symptoms?  · High blood pressure may not cause symptoms. Very high blood pressure (hypertensive crisis) may cause:  ? Headache.  ? Feelings of worry or nervousness (anxiety).  ? Shortness of breath.  ? Nosebleed.  ? A feeling of being sick to your stomach (nausea).  ? Throwing up  (vomiting).  ? Changes in how you see.  ? Very bad chest pain.  ? Seizures.  How is this treated?  · This condition is treated by making healthy lifestyle changes, such as:  ? Eating healthy foods.  ? Exercising more.  ? Drinking less alcohol.  · Your health care provider may prescribe medicine if lifestyle changes are not enough to get your blood pressure under control, and if:  ? Your top number is above 130.  ? Your bottom number is above 80.  · Your personal target blood pressure may vary.  Follow these instructions at home:  Eating and drinking    · If told, follow the DASH eating plan. To follow this plan:  ? Fill one half of your plate at each meal with fruits and vegetables.  ? Fill one fourth of your plate at each meal with whole grains. Whole grains include whole-wheat pasta, brown rice, and whole-grain bread.  ? Eat or drink low-fat dairy products, such as skim milk or low-fat yogurt.  ? Fill one fourth of your plate at each meal with low-fat (lean) proteins. Low-fat proteins include fish, chicken without skin, eggs, beans, and tofu.  ? Avoid fatty meat, cured and processed meat, or chicken with skin.  ? Avoid pre-made or processed food.  · Eat less than 1,500 mg of salt each day.  · Do not drink alcohol if:  ? Your doctor tells you not to drink.  ? You are pregnant, may be pregnant, or are planning to become pregnant.  · If you drink alcohol:  ? Limit how much you use to:  § 0-1 drink a day for women.  § 0-2 drinks a day for men.  ? Be aware of how much alcohol is in your drink. In the [place] one drink equals one 12 oz bottle of beer (355 mL), one 5 oz glass of wine (148 mL), or one 1½ oz glass of hard liquor (44 mL).    Lifestyle    · Work with your doctor to stay at a healthy weight or to lose weight. Ask your doctor what the best weight is for you.  · Get at least 30 minutes of exercise most days of the week. This may include walking, swimming, or biking.  · Get at least 30 minutes of exercise that  strengthens your muscles (resistance exercise) at least 3 days a week. This may include lifting weights or doing Pilates.  · Do not use any products that contain nicotine or tobacco, such as cigarettes, e-cigarettes, and chewing tobacco. If you need help quitting, ask your doctor.  · Check your blood pressure at home as told by your doctor.  · Keep all follow-up visits as told by your doctor. This is important.    Medicines  · Take over-the-counter and prescription medicines only as told by your doctor. Follow directions carefully.  · Do not skip doses of blood pressure medicine. The medicine does not work as well if you skip doses. Skipping doses also puts you at risk for problems.  · Ask your doctor about side effects or reactions to medicines that you should watch for.  Contact a doctor if you:  · Think you are having a reaction to the medicine you are taking.  · Have headaches that keep coming back (recurring).  · Feel dizzy.  · Have swelling in your ankles.  · Have trouble with your vision.  Get help right away if you:  · Get a very bad headache.  · Start to feel mixed up (confused).  · Feel weak or numb.  · Feel faint.  · Have very bad pain in your:  ? Chest.  ? Belly (abdomen).  · Throw up more than once.  · Have trouble breathing.  Summary  · Hypertension is another name for high blood pressure.  · High blood pressure forces your heart to work harder to pump blood.  · For most people, a normal blood pressure is less than 120/80.  · Making healthy choices can help lower blood pressure. If your blood pressure does not get lower with healthy choices, you may need to take medicine.  This information is not intended to replace advice given to you by your health care provider. Make sure you discuss any questions you have with your health care provider.  Document Revised: 08/28/2019 Document Reviewed: 08/28/2019  [employer] Patient Education © 2021 [employer]    Hypertension, Adult  Hypertension is another name  for high blood pressure. High blood pressure forces your heart to work harder to pump blood. This can cause problems over time.  There are two numbers in a blood pressure reading. There is a top number (systolic) over a bottom number (diastolic). It is best to have a blood pressure that is below 120/80. Healthy choices can help lower your blood pressure, or you may need medicine to help lower it.  What are the causes?  The cause of this condition is not known. Some conditions may be related to high blood pressure.  What increases the risk?  · Smoking.  · Having type 2 diabetes mellitus, high cholesterol, or both.  · Not getting enough exercise or physical activity.  · Being overweight.  · Having too much fat, sugar, calories, or salt (sodium) in your diet.  · Drinking too much alcohol.  · Having long-term (chronic) kidney disease.  · Having a family history of high blood pressure.  · Age. Risk increases with age.  · Race. You may be at higher risk if you are .  · Gender. Men are at higher risk than women before age 45. After age 65, women are at higher risk than men.  · Having obstructive sleep apnea.  · Stress.  What are the signs or symptoms?  · High blood pressure may not cause symptoms. Very high blood pressure (hypertensive crisis) may cause:  ? Headache.  ? Feelings of worry or nervousness (anxiety).  ? Shortness of breath.  ? Nosebleed.  ? A feeling of being sick to your stomach (nausea).  ? Throwing up (vomiting).  ? Changes in how you see.  ? Very bad chest pain.  ? Seizures.  How is this treated?  · This condition is treated by making healthy lifestyle changes, such as:  ? Eating healthy foods.  ? Exercising more.  ? Drinking less alcohol.  · Your health care provider may prescribe medicine if lifestyle changes are not enough to get your blood pressure under control, and if:  ? Your top number is above 130.  ? Your bottom number is above 80.  · Your personal target blood pressure may  vary.  Follow these instructions at home:  Eating and drinking    · If told, follow the DASH eating plan. To follow this plan:  ? Fill one half of your plate at each meal with fruits and vegetables.  ? Fill one fourth of your plate at each meal with whole grains. Whole grains include whole-wheat pasta, brown rice, and whole-grain bread.  ? Eat or drink low-fat dairy products, such as skim milk or low-fat yogurt.  ? Fill one fourth of your plate at each meal with low-fat (lean) proteins. Low-fat proteins include fish, chicken without skin, eggs, beans, and tofu.  ? Avoid fatty meat, cured and processed meat, or chicken with skin.  ? Avoid pre-made or processed food.  · Eat less than 1,500 mg of salt each day.  · Do not drink alcohol if:  ? Your doctor tells you not to drink.  ? You are pregnant, may be pregnant, or are planning to become pregnant.  · If you drink alcohol:  ? Limit how much you use to:  § 0-1 drink a day for women.  § 0-2 drinks a day for men.  ? Be aware of how much alcohol is in your drink. In the [place] one drink equals one 12 oz bottle of beer (355 mL), one 5 oz glass of wine (148 mL), or one 1½ oz glass of hard liquor (44 mL).    Lifestyle    · Work with your doctor to stay at a healthy weight or to lose weight. Ask your doctor what the best weight is for you.  · Get at least 30 minutes of exercise most days of the week. This may include walking, swimming, or biking.  · Get at least 30 minutes of exercise that strengthens your muscles (resistance exercise) at least 3 days a week. This may include lifting weights or doing Pilates.  · Do not use any products that contain nicotine or tobacco, such as cigarettes, e-cigarettes, and chewing tobacco. If you need help quitting, ask your doctor.  · Check your blood pressure at home as told by your doctor.  · Keep all follow-up visits as told by your doctor. This is important.    Medicines  · Take over-the-counter and prescription medicines only as told by  your doctor. Follow directions carefully.  · Do not skip doses of blood pressure medicine. The medicine does not work as well if you skip doses. Skipping doses also puts you at risk for problems.  · Ask your doctor about side effects or reactions to medicines that you should watch for.  Contact a doctor if you:  · Think you are having a reaction to the medicine you are taking.  · Have headaches that keep coming back (recurring).  · Feel dizzy.  · Have swelling in your ankles.  · Have trouble with your vision.  Get help right away if you:  · Get a very bad headache.  · Start to feel mixed up (confused).  · Feel weak or numb.  · Feel faint.  · Have very bad pain in your:  ? Chest.  ? Belly (abdomen).  · Throw up more than once.  · Have trouble breathing.  Summary  · Hypertension is another name for high blood pressure.  · High blood pressure forces your heart to work harder to pump blood.  · For most people, a normal blood pressure is less than 120/80.  · Making healthy choices can help lower blood pressure. If your blood pressure does not get lower with healthy choices, you may need to take medicine.  This information is not intended to replace advice given to you by your health care provider. Make sure you discuss any questions you have with your health care provider.  Document Revised: 08/28/2019 Document Reviewed: 08/28/2019  [employer] Patient Education © 2021 [employer]    Hypertension, Adult  Hypertension is another name for high blood pressure. High blood pressure forces your heart to work harder to pump blood. This can cause problems over time.  There are two numbers in a blood pressure reading. There is a top number (systolic) over a bottom number (diastolic). It is best to have a blood pressure that is below 120/80. Healthy choices can help lower your blood pressure, or you may need medicine to help lower it.  What are the causes?  The cause of this condition is not known. Some conditions may be related to  high blood pressure.  What increases the risk?  · Smoking.  · Having type 2 diabetes mellitus, high cholesterol, or both.  · Not getting enough exercise or physical activity.  · Being overweight.  · Having too much fat, sugar, calories, or salt (sodium) in your diet.  · Drinking too much alcohol.  · Having long-term (chronic) kidney disease.  · Having a family history of high blood pressure.  · Age. Risk increases with age.  · Race. You may be at higher risk if you are .  · Gender. Men are at higher risk than women before age 45. After age 65, women are at higher risk than men.  · Having obstructive sleep apnea.  · Stress.  What are the signs or symptoms?  · High blood pressure may not cause symptoms. Very high blood pressure (hypertensive crisis) may cause:  ? Headache.  ? Feelings of worry or nervousness (anxiety).  ? Shortness of breath.  ? Nosebleed.  ? A feeling of being sick to your stomach (nausea).  ? Throwing up (vomiting).  ? Changes in how you see.  ? Very bad chest pain.  ? Seizures.  How is this treated?  · This condition is treated by making healthy lifestyle changes, such as:  ? Eating healthy foods.  ? Exercising more.  ? Drinking less alcohol.  · Your health care provider may prescribe medicine if lifestyle changes are not enough to get your blood pressure under control, and if:  ? Your top number is above 130.  ? Your bottom number is above 80.  · Your personal target blood pressure may vary.  Follow these instructions at home:  Eating and drinking    · If told, follow the DASH eating plan. To follow this plan:  ? Fill one half of your plate at each meal with fruits and vegetables.  ? Fill one fourth of your plate at each meal with whole grains. Whole grains include whole-wheat pasta, brown rice, and whole-grain bread.  ? Eat or drink low-fat dairy products, such as skim milk or low-fat yogurt.  ? Fill one fourth of your plate at each meal with low-fat (lean) proteins. Low-fat  proteins include fish, chicken without skin, eggs, beans, and tofu.  ? Avoid fatty meat, cured and processed meat, or chicken with skin.  ? Avoid pre-made or processed food.  · Eat less than 1,500 mg of salt each day.  · Do not drink alcohol if:  ? Your doctor tells you not to drink.  ? You are pregnant, may be pregnant, or are planning to become pregnant.  · If you drink alcohol:  ? Limit how much you use to:  § 0-1 drink a day for women.  § 0-2 drinks a day for men.  ? Be aware of how much alcohol is in your drink. In the [place] one drink equals one 12 oz bottle of beer (355 mL), one 5 oz glass of wine (148 mL), or one 1½ oz glass of hard liquor (44 mL).    Lifestyle    · Work with your doctor to stay at a healthy weight or to lose weight. Ask your doctor what the best weight is for you.  · Get at least 30 minutes of exercise most days of the week. This may include walking, swimming, or biking.  · Get at least 30 minutes of exercise that strengthens your muscles (resistance exercise) at least 3 days a week. This may include lifting weights or doing Pilates.  · Do not use any products that contain nicotine or tobacco, such as cigarettes, e-cigarettes, and chewing tobacco. If you need help quitting, ask your doctor.  · Check your blood pressure at home as told by your doctor.  · Keep all follow-up visits as told by your doctor. This is important.    Medicines  · Take over-the-counter and prescription medicines only as told by your doctor. Follow directions carefully.  · Do not skip doses of blood pressure medicine. The medicine does not work as well if you skip doses. Skipping doses also puts you at risk for problems.  · Ask your doctor about side effects or reactions to medicines that you should watch for.  Contact a doctor if you:  · Think you are having a reaction to the medicine you are taking.  · Have headaches that keep coming back (recurring).  · Feel dizzy.  · Have swelling in your ankles.  · Have trouble  with your vision.  Get help right away if you:  · Get a very bad headache.  · Start to feel mixed up (confused).  · Feel weak or numb.  · Feel faint.  · Have very bad pain in your:  ? Chest.  ? Belly (abdomen).  · Throw up more than once.  · Have trouble breathing.  Summary  · Hypertension is another name for high blood pressure.  · High blood pressure forces your heart to work harder to pump blood.  · For most people, a normal blood pressure is less than 120/80.  · Making healthy choices can help lower blood pressure. If your blood pressure does not get lower with healthy choices, you may need to take medicine.  This information is not intended to replace advice given to you by your health care provider. Make sure you discuss any questions you have with your health care provider.  Document Revised: 08/28/2019 Document Reviewed: 08/28/2019  Elsevier Patient Education © 2021 Elsevier Inc.

## 2021-10-07 NOTE — ASSESSMENT & PLAN NOTE
Sending her for x-ray of right wrist given injury with ongoing pain and swelling.  We will also check labs for other causes of autoimmune-type arthritis

## 2021-10-09 LAB
DSDNA IGG SERPL IA-ACNC: NEGATIVE [IU]/ML
NUCLEAR IGG SER IA-RTO: NEGATIVE

## 2021-12-15 ENCOUNTER — OFFICE VISIT (OUTPATIENT)
Dept: GASTROENTEROLOGY | Facility: CLINIC | Age: 71
End: 2021-12-15

## 2021-12-15 VITALS
WEIGHT: 180.2 LBS | HEIGHT: 67 IN | HEART RATE: 117 BPM | DIASTOLIC BLOOD PRESSURE: 105 MMHG | BODY MASS INDEX: 28.28 KG/M2 | SYSTOLIC BLOOD PRESSURE: 169 MMHG

## 2021-12-15 DIAGNOSIS — R10.84 GENERALIZED ABDOMINAL PAIN: ICD-10-CM

## 2021-12-15 DIAGNOSIS — K59.01 SLOW TRANSIT CONSTIPATION: ICD-10-CM

## 2021-12-15 DIAGNOSIS — R14.0 ABDOMINAL BLOATING: ICD-10-CM

## 2021-12-15 DIAGNOSIS — K51.00 ULCERATIVE PANCOLITIS WITHOUT COMPLICATION (HCC): Primary | ICD-10-CM

## 2021-12-15 DIAGNOSIS — R63.0 ANOREXIA: ICD-10-CM

## 2021-12-15 PROCEDURE — 99214 OFFICE O/P EST MOD 30 MIN: CPT | Performed by: NURSE PRACTITIONER

## 2021-12-15 RX ORDER — SOD SULF/POT CHLORIDE/MAG SULF 1.479 G
12 TABLET ORAL TAKE AS DIRECTED
Qty: 24 TABLET | Refills: 0 | Status: SHIPPED | OUTPATIENT
Start: 2021-12-15 | End: 2022-03-03 | Stop reason: HOSPADM

## 2021-12-15 RX ORDER — POLYETHYLENE GLYCOL 3350 17 G/17G
17 POWDER, FOR SOLUTION ORAL DAILY
Qty: 527 G | Refills: 1 | Status: SHIPPED | OUTPATIENT
Start: 2021-12-15 | End: 2022-01-14

## 2021-12-15 NOTE — PROGRESS NOTES
Chief Complaint  Ulcerative Colitis    Татьяна Jackson is a 71 y.o. female who presents to [facility] on referral from No ref. provider found for a gastroenterology evaluation of UC.  History of Present Illness  She was diagnosed with Ulcerative Colitis at age 18.      Reports previously being treated with steroids, enemas and sulfasalazine in the past.  Stopped medications 12 years ago.      Reports that she has a fullness in upper part of stomach.  Admits abdominal bloating and abdominal pain.  She denies improving and worsening factors.     Admits constipation and has a bowel movement 1-2 times per week.  She's using an otc stimulant laxative to have a bowel movement.  Denies rectal bleeding and lower abdominal pain.  Previously tried metamucil but felt it made bloating worse.      Previous colonoscopy several years ago.  She's unsure of where this was.      Admits heartburn that's worse at night.  Denies dysphagia.    She is somewhat of a poor historian and has a difficult time recalling facts and dates.  She's unsure of how long it's been since her last colonoscopy.  She also thinks that she had some sort of tumor removed from her abdomen a few years ago.        Past Medical History:   Diagnosis Date   • Colitis    • Ulcerative colitis (HCC)        Past Surgical History:   Procedure Laterality Date   • COLONOSCOPY      [place]-doesn't know name of hospital          Current Outpatient Medications:   •  ascorbic acid (VITAMIN C) 500 MG tablet, Take 500 mg by mouth., Disp: , Rfl:   •  multivitamin (THERAGRAN) tablet tablet, Take 1 tablet by mouth., Disp: , Rfl:   •  vitamin D3 (vitamin d) 125 MCG (5000 UT) capsule capsule, Take 5,000 Units by mouth., Disp: , Rfl:   •  polyethylene glycol (MIRALAX) 17 GM/SCOOP powder, Take 17 g by mouth Daily for 30 days., Disp: 527 g, Rfl: 1  •  Sodium Sulfate-Mag Sulfate-KCl (Sutab) 5541-359-651 MG tablet, Take 12 tablets by mouth Take  "As Directed. Take 12 tablets at 6 pm and 12 tablets 4 hours prior to procedure., Disp: 24 tablet, Rfl: 0     No Known Allergies    History reviewed. No pertinent family history.     Social History     Social History Narrative   • Not on file       Immunization:    There is no immunization history on file for this patient.     Objective     Review of Systems     Vital Signs:   BP (!) 169/105 (BP Location: Left arm, Patient Position: Sitting, Cuff Size: Adult)   Pulse 117   Ht 170.2 cm (67\")   Wt 81.7 kg (180 lb 3.2 oz)   BMI 28.22 kg/m²       Physical Exam  Constitutional:       General: She is not in acute distress.     Appearance: Normal appearance. She is well-developed and normal weight.   HENT:      Head: Normocephalic and atraumatic.   Eyes:      Conjunctiva/sclera: Conjunctivae normal.      Pupils: Pupils are equal, round, and reactive to light.      Visual Fields: Right eye visual fields normal and left eye visual fields normal.   Cardiovascular:      Rate and Rhythm: Normal rate and regular rhythm.      Heart sounds: Normal heart sounds.   Pulmonary:      Effort: Pulmonary effort is normal. No retractions.      Breath sounds: Normal breath sounds and air entry.   Abdominal:      General: Bowel sounds are normal.      Palpations: Abdomen is soft.      Tenderness: There is abdominal tenderness (epigastric and lower abdomen).      Comments: No appreciable hepatosplenomegaly or ascites   Musculoskeletal:         General: Normal range of motion.      Cervical back: Neck supple.      Right lower leg: No edema.      Left lower leg: No edema.   Lymphadenopathy:      Cervical: No cervical adenopathy.   Skin:     General: Skin is warm and dry.      Findings: No lesion.   Neurological:      General: No focal deficit present.      Mental Status: She is alert and oriented to person, place, and time.   Psychiatric:         Mood and Affect: Mood and affect normal.         Behavior: Behavior normal.          Result " Review :   The following data was reviewed by: SHERYL Ramsey on 12/15/2021:  CMP    CMP 8/13/21 10/7/21   Glucose 84 85   BUN 11 12   Creatinine 0.76 0.82   eGFR African Am 91 83   Sodium 144 143   Potassium 3.5 3.8   Chloride 107 107   Calcium 9.7 9.4   Albumin 4.50 4.50   Total Bilirubin 0.3 0.3   Alkaline Phosphatase 78 74   AST (SGOT) 17 21   ALT (SGPT) 14 22      Comments are available for some flowsheets but are not being displayed.           CBC w/diff    CBC w/Diff 8/13/21 10/7/21   WBC 6.76 6.65   RBC 5.14 5.35 (A)   Hemoglobin 13.9 14.3   Hematocrit 42.7 43.9   MCV 83.1 82.1   MCH 27.0 26.7   MCHC 32.6 32.6   RDW 15.5 (A) 14.4   Platelets 239 148   Neutrophil Rel % 49.7    Immature Granulocyte Rel % 0.1    Lymphocyte Rel % 39.6    Monocyte Rel % 7.8    Eosinophil Rel % 2.2    Basophil Rel % 0.6    (A) Abnormal value                      Assessment and Plan    Diagnoses and all orders for this visit:    1. Ulcerative pancolitis without complication (HCC) (Primary)  -     Case Request; Standing  -     Case Request  -     CT Abdomen Pelvis With Contrast; Future    2. Slow transit constipation  -     Case Request; Standing  -     Case Request  -     CT Abdomen Pelvis With Contrast; Future    3. Anorexia  -     CT Abdomen Pelvis With Contrast; Future    4. Generalized abdominal pain  -     CT Abdomen Pelvis With Contrast; Future    5. Abdominal bloating  -     CT Abdomen Pelvis With Contrast; Future    Other orders  -     polyethylene glycol (MIRALAX) 17 GM/SCOOP powder; Take 17 g by mouth Daily for 30 days.  Dispense: 527 g; Refill: 1  -     Follow Anesthesia Guidelines / Protocol; Future  -     Obtain Informed Consent; Future  -     Sodium Sulfate-Mag Sulfate-KCl (Sutab) 0088-014-874 MG tablet; Take 12 tablets by mouth Take As Directed. Take 12 tablets at 6 pm and 12 tablets 4 hours prior to procedure.  Dispense: 24 tablet; Refill: 0        COLONOSCOPY (N/A)        Follow Up   Return for F/U after  procedure.  Patient was given instructions and counseling regarding her condition or for health maintenance advice. Please see specific information pulled into the AVS if appropriate.

## 2021-12-30 ENCOUNTER — HOSPITAL ENCOUNTER (OUTPATIENT)
Dept: CT IMAGING | Facility: HOSPITAL | Age: 71
Discharge: HOME | End: 2021-12-30
Admitting: NURSE PRACTITIONER

## 2021-12-30 DIAGNOSIS — R14.0 ABDOMINAL BLOATING: ICD-10-CM

## 2021-12-30 DIAGNOSIS — R63.0 ANOREXIA: ICD-10-CM

## 2021-12-30 DIAGNOSIS — K51.00 ULCERATIVE PANCOLITIS WITHOUT COMPLICATION (HCC): ICD-10-CM

## 2021-12-30 DIAGNOSIS — K59.01 SLOW TRANSIT CONSTIPATION: ICD-10-CM

## 2021-12-30 DIAGNOSIS — R10.84 GENERALIZED ABDOMINAL PAIN: ICD-10-CM

## 2021-12-30 LAB — CREAT BLDA-MCNC: 0.7 MG/DL

## 2021-12-30 PROCEDURE — 82565 ASSAY OF CREATININE: CPT

## 2021-12-30 PROCEDURE — 74177 CT ABD & PELVIS W/CONTRAST: CPT

## 2021-12-30 PROCEDURE — 0 IOPAMIDOL PER 1 ML: Performed by: NURSE PRACTITIONER

## 2021-12-30 RX ADMIN — IOPAMIDOL 100 ML: 755 INJECTION, SOLUTION INTRAVENOUS at 14:54

## 2022-01-03 DIAGNOSIS — R92.8 OTHER ABNORMAL AND INCONCLUSIVE FINDINGS ON DIAGNOSTIC IMAGING OF BREAST: ICD-10-CM

## 2022-01-03 DIAGNOSIS — R93.89 ABNORMAL CT SCAN: Primary | ICD-10-CM

## 2022-01-04 ENCOUNTER — DOCUMENTATION (OUTPATIENT)
Dept: INTERNAL MEDICINE | Facility: CLINIC | Age: 72
End: 2022-01-04

## 2022-01-04 ENCOUNTER — TELEPHONE (OUTPATIENT)
Dept: INTERNAL MEDICINE | Facility: CLINIC | Age: 72
End: 2022-01-04

## 2022-01-04 NOTE — PROGRESS NOTES
Spoke with patient regarding CT scan and abnormal findings.  Patient has appointment tomorrow at the hospital for screening mammogram that was previously ordered.   Orders placed for diagnostic mammogram and ultrasound for left breast abnormality.  Staff calling mammography to adjust appointment type.  Discussed abnormal findings of liver as well and need for further imaging which will depend on mammogram results.  Patient verbalized understanding.

## 2022-01-05 ENCOUNTER — HOSPITAL ENCOUNTER (OUTPATIENT)
Dept: MAMMOGRAPHY | Facility: HOSPITAL | Age: 72
Discharge: HOME | End: 2022-01-05

## 2022-01-05 ENCOUNTER — HOSPITAL ENCOUNTER (OUTPATIENT)
Dept: BONE DENSITY | Facility: HOSPITAL | Age: 72
Discharge: HOME | End: 2022-01-05

## 2022-01-05 DIAGNOSIS — Z78.0 POSTMENOPAUSE: ICD-10-CM

## 2022-01-05 DIAGNOSIS — Z12.31 ENCOUNTER FOR SCREENING MAMMOGRAM FOR MALIGNANT NEOPLASM OF BREAST: ICD-10-CM

## 2022-01-05 PROCEDURE — 77080 DXA BONE DENSITY AXIAL: CPT

## 2022-01-12 ENCOUNTER — HOSPITAL ENCOUNTER (OUTPATIENT)
Dept: MAMMOGRAPHY | Facility: HOSPITAL | Age: 72
Discharge: HOME | End: 2022-01-12

## 2022-01-12 ENCOUNTER — TRANSCRIBE ORDERS (OUTPATIENT)
Dept: ADMINISTRATIVE | Facility: HOSPITAL | Age: 72
End: 2022-01-12

## 2022-01-12 ENCOUNTER — HOSPITAL ENCOUNTER (OUTPATIENT)
Dept: ULTRASOUND IMAGING | Facility: HOSPITAL | Age: 72
Discharge: HOME | End: 2022-01-12

## 2022-01-12 DIAGNOSIS — N63.20 LEFT BREAST MASS: Primary | ICD-10-CM

## 2022-01-12 DIAGNOSIS — R92.8 OTHER ABNORMAL AND INCONCLUSIVE FINDINGS ON DIAGNOSTIC IMAGING OF BREAST: ICD-10-CM

## 2022-01-12 DIAGNOSIS — R93.89 ABNORMAL CT SCAN: ICD-10-CM

## 2022-01-12 PROCEDURE — G0279 TOMOSYNTHESIS, MAMMO: HCPCS

## 2022-01-12 PROCEDURE — 77066 DX MAMMO INCL CAD BI: CPT

## 2022-01-12 PROCEDURE — 76642 ULTRASOUND BREAST LIMITED: CPT

## 2022-01-13 ENCOUNTER — TELEPHONE (OUTPATIENT)
Dept: INTERNAL MEDICINE | Facility: CLINIC | Age: 72
End: 2022-01-13

## 2022-01-13 NOTE — TELEPHONE ENCOUNTER
Hub staff attempted to follow warm transfer process and was unsuccessful     Caller: Татьяна Jackson    Relationship to patient: Self    Best call back number: [phone]     Patient is needing: PATIENT CALLED AND SAID SHE HAS A BURNING RASH ON HER BREAST AND BACK AND IT BURNS LIKE SHE'S ON FIRE AND THINKS IT'S SHINGLES. SHE WOULD LIKE TO SCHEDULE SOMETHING WITH YASEMIN FENTON.

## 2022-01-19 ENCOUNTER — OFFICE VISIT (OUTPATIENT)
Dept: INTERNAL MEDICINE | Facility: CLINIC | Age: 72
End: 2022-01-19

## 2022-01-19 VITALS
TEMPERATURE: 98.1 F | SYSTOLIC BLOOD PRESSURE: 156 MMHG | DIASTOLIC BLOOD PRESSURE: 88 MMHG | HEART RATE: 95 BPM | WEIGHT: 180 LBS | OXYGEN SATURATION: 100 % | HEIGHT: 67 IN | BODY MASS INDEX: 28.25 KG/M2

## 2022-01-19 DIAGNOSIS — B02.9 HERPES ZOSTER WITHOUT COMPLICATION: Primary | ICD-10-CM

## 2022-01-19 PROCEDURE — 99213 OFFICE O/P EST LOW 20 MIN: CPT | Performed by: NURSE PRACTITIONER

## 2022-01-19 NOTE — ASSESSMENT & PLAN NOTE
Discussed with patient that herpes is a viral etiology and symptoms should continue to improve.  Patient with symptoms x2 weeks, outside of window for antiviral therapy.  Continue Tylenol for discomfort.  Discussed avoiding those who are immunocompromised, unvaccinated children or pregnant and the importance of good hand hygiene. Discussed the potential for postherpetic neuralgia.  Due to discomfort, patient's age and underlying conditions will follow-up with PCP in 2 weeks to ensure improvement.  She will seek medical attention immediately with concerns.

## 2022-01-19 NOTE — PROGRESS NOTES
"Chief Complaint  Rash (under left breast and spreads to back)    Subjective          Татьяна Jackson presents to [facility]  Patient reports 2 weeks ago she started to develop a rash on the left side of her upper back, rash progressed to the left upper abdomen under the left breast.  Patient states initially the rash burned, has been able to turn into more mild/aching sensation.  Describes the rash as red and flat.  She has not noticed new lesions since the initial outbreak.  Denies discharge, chills, fever, blistering.  Discomfort does improve with Tylenol.  Denies changes in soap, laundry detergent, environmental exposures.  Patient reports she has recently been under a lot of stress, had an abnormal mammogram and liver imaging.  States that her mother told her she never had chickenpox as a child.      Objective   Vital Signs:   /88 (BP Location: Right arm, Patient Position: Sitting, Cuff Size: Adult)   Pulse 95   Temp 98.1 °F (36.7 °C) (Temporal)   Ht 170.2 cm (67\")   Wt 81.6 kg (180 lb)   SpO2 100%   BMI 28.19 kg/m²     Physical Exam  Constitutional:       Appearance: Normal appearance. She is normal weight.   HENT:      Head: Normocephalic and atraumatic.      Nose: Nose normal.      Mouth/Throat:      Mouth: Mucous membranes are moist.      Pharynx: Oropharynx is clear.   Eyes:      Extraocular Movements: Extraocular movements intact.      Conjunctiva/sclera: Conjunctivae normal.      Pupils: Pupils are equal, round, and reactive to light.   Cardiovascular:      Rate and Rhythm: Normal rate and regular rhythm.      Heart sounds: Normal heart sounds.   Pulmonary:      Effort: Pulmonary effort is normal.      Breath sounds: Normal breath sounds.   Skin:     General: Skin is warm and dry.      Comments: Vesicular rash scattered to the upper abdomen under the left breast, 1 vesicular lesion to the left upper back; unilateral, does not cross midline; " generalized area tender to touch   Neurological:      General: No focal deficit present.      Mental Status: She is alert and oriented to person, place, and time.   Psychiatric:         Mood and Affect: Mood normal.         Behavior: Behavior normal.         Thought Content: Thought content normal.        Result Review :                 Assessment and Plan    Diagnoses and all orders for this visit:    1. Herpes zoster without complication (Primary)  Assessment & Plan:  Discussed with patient that herpes is a viral etiology and symptoms should continue to improve.  Patient with symptoms x2 weeks, outside of window for antiviral therapy.  Continue Tylenol for discomfort.  Discussed avoiding those who are immunocompromised, unvaccinated children or pregnant and the importance of good hand hygiene. Discussed the potential for postherpetic neuralgia.  Due to discomfort, patient's age and underlying conditions will follow-up with PCP in 2 weeks to ensure improvement.  She will seek medical attention immediately with concerns.        Follow Up   Return in about 2 weeks (around 2/2/2022) for Follow up with SHERYL Mae.  Patient was given instructions and counseling regarding her condition or for health maintenance advice. Please see specific information pulled into the AVS if appropriate.

## 2022-01-19 NOTE — PATIENT INSTRUCTIONS
Shingles    Shingles is an infection. It gives you a painful skin rash and blisters that have fluid in them. Shingles is caused by the same germ (virus) that causes chickenpox.  Shingles only happens in people who:  · Have had chickenpox.  · Have been given a shot of medicine (vaccine) to protect against chickenpox. Shingles is rare in this group.  The first symptoms of shingles may be itching, tingling, or pain in an area on your skin. A rash will show on your skin a few days or weeks later. The rash is likely to be on one side of your body. The rash usually has a shape like a belt or a band. Over time, the rash turns into fluid-filled blisters. The blisters will break open, change into scabs, and dry up. Medicines may:  · Help with pain and itching.  · Help you get better sooner.  · Help to prevent long-term problems.  Follow these instructions at home:  Medicines  · Take over-the-counter and prescription medicines only as told by your doctor.  · Put on an anti-itch cream or numbing cream where you have a rash, blisters, or scabs. Do this as told by your doctor.  Helping with itching and discomfort    · Put cold, wet cloths (cold compresses) on the area of the rash or blisters as told by your doctor.  · Cool baths can help you feel better. Try adding baking soda or dry oatmeal to the water to lessen itching. Do not bathe in hot water.    Blister and rash care  · Keep your rash covered with a loose bandage (dressing).  · Wear loose clothing that does not rub on your rash.  · Keep your rash and blisters clean. To do this, wash the area with mild soap and cool water as told by your doctor.  · Check your rash every day for signs of infection. Check for:  ? More redness, swelling, or pain.  ? Fluid or blood.  ? Warmth.  ? Pus or a bad smell.  · Do not scratch your rash. Do not pick at your blisters. To help you to not scratch:  ? Keep your fingernails clean and cut short.  ? Wear gloves or mittens when you sleep, if  scratching is a problem.  General instructions  · Rest as told by your doctor.  · Keep all follow-up visits as told by your doctor. This is important.  · Wash your hands often with soap and water. If soap and water are not available, use hand . Doing this lowers your chance of getting a skin infection caused by germs (bacteria).  · Your infection can cause chickenpox in people who have never had chickenpox or never got a shot of chickenpox vaccine. If you have blisters that did not change into scabs yet, try not to touch other people or be around other people, especially:  ? Babies.  ? Pregnant women.  ? Children who have areas of red, itchy, or rough skin (eczema).  ? Very old people who have transplants.  ? People who have a long-term (chronic) sickness, like cancer or AIDS.  Contact a doctor if:  · Your pain does not get better with medicine.  · Your pain does not get better after the rash heals.  · You have any signs of infection in the rash area. These signs include:  ? More redness, swelling, or pain around the rash.  ? Fluid or blood coming from the rash.  ? The rash area feeling warm to the touch.  ? Pus or a bad smell coming from the rash.  Get help right away if:  · The rash is on your face or nose.  · You have pain in your face or pain by your eye.  · You lose feeling on one side of your face.  · You have trouble seeing.  · You have ear pain, or you have ringing in your ear.  · You have a loss of taste.  · Your condition gets worse.  Summary  · Shingles gives you a painful skin rash and blisters that have fluid in them.  · Shingles is an infection. It is caused by the same germ (virus) that causes chickenpox.  · Keep your rash covered with a loose bandage (dressing). Wear loose clothing that does not rub on your rash.  · If you have blisters that did not change into scabs yet, try not to touch other people or be around people.  This information is not intended to replace advice given to you by  your health care provider. Make sure you discuss any questions you have with your health care provider.  Document Revised: 04/10/2020 Document Reviewed: 08/22/2018  Elsevier Patient Education © 2021 [employer] Inc.

## 2022-01-27 ENCOUNTER — TELEPHONE (OUTPATIENT)
Dept: INTERNAL MEDICINE | Facility: CLINIC | Age: 72
End: 2022-01-27

## 2022-01-27 NOTE — TELEPHONE ENCOUNTER
Caller: [facility]    Best call back number: [phone]    What orders are you requesting (i.e. lab or imaging): ULTRASOUND BIOPSY    In what timeframe would the patient need to come in: ASAP    Where will you receive your lab/imaging services: [facility]    Additional notes: PATIENT IS SCHEDULED FOR AN ULTRASOUND ON 1/31/22 AND THEY ARE NEEDING THE ORDER SENT OVER.    FAX: [phone]

## 2022-01-31 ENCOUNTER — HOSPITAL ENCOUNTER (OUTPATIENT)
Dept: MAMMOGRAPHY | Facility: HOSPITAL | Age: 72
Discharge: HOME | End: 2022-01-31

## 2022-01-31 ENCOUNTER — HOSPITAL ENCOUNTER (OUTPATIENT)
Dept: ULTRASOUND IMAGING | Facility: HOSPITAL | Age: 72
Discharge: HOME | End: 2022-01-31

## 2022-01-31 ENCOUNTER — DOCUMENTATION (OUTPATIENT)
Dept: MAMMOGRAPHY | Facility: HOSPITAL | Age: 72
End: 2022-01-31

## 2022-01-31 DIAGNOSIS — N63.20 LEFT BREAST MASS: ICD-10-CM

## 2022-01-31 DIAGNOSIS — N63.10 BREAST MASS, RIGHT: ICD-10-CM

## 2022-01-31 PROCEDURE — 88305 TISSUE EXAM BY PATHOLOGIST: CPT | Performed by: NURSE PRACTITIONER

## 2022-01-31 PROCEDURE — 88342 IMHCHEM/IMCYTCHM 1ST ANTB: CPT | Performed by: NURSE PRACTITIONER

## 2022-01-31 PROCEDURE — 0 LIDOCAINE 1 % SOLUTION: Performed by: NURSE PRACTITIONER

## 2022-01-31 PROCEDURE — 88360 TUMOR IMMUNOHISTOCHEM/MANUAL: CPT | Performed by: NURSE PRACTITIONER

## 2022-01-31 PROCEDURE — 76642 ULTRASOUND BREAST LIMITED: CPT

## 2022-01-31 PROCEDURE — A4648 IMPLANTABLE TISSUE MARKER: HCPCS

## 2022-01-31 RX ORDER — LIDOCAINE HYDROCHLORIDE AND EPINEPHRINE 10; 10 MG/ML; UG/ML
10 INJECTION, SOLUTION INFILTRATION; PERINEURAL ONCE
Status: COMPLETED | OUTPATIENT
Start: 2022-01-31 | End: 2022-01-31

## 2022-01-31 RX ORDER — LIDOCAINE HYDROCHLORIDE 10 MG/ML
10 INJECTION, SOLUTION INFILTRATION; PERINEURAL ONCE
Status: COMPLETED | OUTPATIENT
Start: 2022-01-31 | End: 2022-01-31

## 2022-01-31 RX ADMIN — LIDOCAINE HYDROCHLORIDE 10 ML: 10 INJECTION, SOLUTION INFILTRATION; PERINEURAL at 14:54

## 2022-01-31 RX ADMIN — LIDOCAINE HYDROCHLORIDE,EPINEPHRINE BITARTRATE 10 ML: 10; .01 INJECTION, SOLUTION INFILTRATION; PERINEURAL at 14:54

## 2022-01-31 NOTE — PROGRESS NOTES
S/W PATIENT AFTER HER BIOPSY ON 1/31/22 AND DISCUSSED DISCHARGE INSTRUCTIONS WITH [employer] FOR PAIN CONTROL AND PT V/U. PT REPORTED THAT SHE FELT A LUMP ABOUT 5 WEEKS AGO AND THEN IT WAS FOUND ON A CT THAT SHE HAD. SHE REPORTED THAT HER LAST MAMMOGRAM HAS BEEN AWHILE AND NOT SURE WHEN THE LAST ONE WAS DONE. PT REPORTED THAT SHE WAS 21 WITH HER FIRST CHILD AND 15 WITH HER FIRST PERIOD AND SHE HAD HER MENOPAUSE ABOUT 55 WITH NO HORMONE REPLACEMENT THERAPY. PT REPORTED THAT SHE HAS NO FAMILY HISTORY OF CANCER. I GAVE PT MY CONTACT CARD AND ENCOURAGED HER TO CALL WITH ANY QUESTIONS OR CONCERNS AND PT V/U.

## 2022-02-03 ENCOUNTER — TELEPHONE (OUTPATIENT)
Dept: INTERNAL MEDICINE | Facility: CLINIC | Age: 72
End: 2022-02-03

## 2022-02-03 NOTE — TELEPHONE ENCOUNTER
Patient called with results. Cherelle called the cancer [facility] to start process for further eval but the office was closed due to weather.

## 2022-02-07 ENCOUNTER — DOCUMENTATION (OUTPATIENT)
Dept: MAMMOGRAPHY | Facility: HOSPITAL | Age: 72
End: 2022-02-07

## 2022-02-07 DIAGNOSIS — C43.52: Primary | ICD-10-CM

## 2022-02-08 ENCOUNTER — OFFICE VISIT (OUTPATIENT)
Dept: INTERNAL MEDICINE | Facility: CLINIC | Age: 72
End: 2022-02-08

## 2022-02-08 VITALS
WEIGHT: 171.6 LBS | HEART RATE: 89 BPM | BODY MASS INDEX: 26.93 KG/M2 | OXYGEN SATURATION: 95 % | TEMPERATURE: 97.2 F | HEIGHT: 67 IN | DIASTOLIC BLOOD PRESSURE: 70 MMHG | RESPIRATION RATE: 18 BRPM | SYSTOLIC BLOOD PRESSURE: 146 MMHG

## 2022-02-08 DIAGNOSIS — R55 NEAR SYNCOPE: ICD-10-CM

## 2022-02-08 DIAGNOSIS — K92.1 HEMATOCHEZIA: Primary | ICD-10-CM

## 2022-02-08 DIAGNOSIS — D05.12 INTRADUCTAL PAPILLARY ADENOCARCINOMA OF LEFT BREAST: ICD-10-CM

## 2022-02-08 PROCEDURE — 99214 OFFICE O/P EST MOD 30 MIN: CPT | Performed by: NURSE PRACTITIONER

## 2022-02-09 LAB
CYTO UR: NORMAL
LAB AP CASE REPORT: NORMAL
LAB AP CLINICAL INFORMATION: NORMAL
LAB AP DIAGNOSIS COMMENT: NORMAL
PATH REPORT.ADDENDUM SPEC: NORMAL
PATH REPORT.FINAL DX SPEC: NORMAL
PATH REPORT.GROSS SPEC: NORMAL

## 2022-02-09 NOTE — PROGRESS NOTES
Chief Complaint  Follow-up (Breast Cancer), Dizziness (1 Week), and Loss of Consciousness (1 Week)    Ryan Jackson presents to [facility]  History of Present Illness    Ms. Jackson presents to the office for follow-up. She has been having dizziness for approximately 1 week. She was also recently seen by GI, who noticed that she had an abnormal appearing mass of her breast on other imaging. She then had abnormal mammograms and a biopsy for further evaluation of this mass. 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 She does have an appointment with the Cancer [facility] to discuss the plan with oncology and surgeon on 02/10/2022.    Ms. Jackson reports that she has been dizzy all day. She denies any symptoms prior to today's visit. She states that she has difficulty having bowel movements due to them causing her to have severe pain, a drop in her blood pressure and heart rate, and blurred vision. She denies her bowels are hard, and states she has nausea once her blood pressure and heart rate returns within normal limits. She denies having full syncopal episodes during that time but reports near syncope.  She states she is having a significant amount of abdominal pain, but only when she is having a bowel movement.  She reports that stools this morning appeared like coffee grounds.  She denies experiencing this before. She denies feeling herself having a bowel movement this morning. The patient reports having dizziness ambulating back to the exam room; however, she does feel better after sitting. She adds she has been shaking involuntarily. She notes eating an apple this morning. The patient denies vomiting, but is nauseous today.    The patient confirms having her daughter present with her today. She states her daughter will be able to transport her to the emergency room today.    Review of Systems  "  Constitutional: Negative for chills and fever.   Respiratory: Negative for shortness of breath.    Cardiovascular: Negative for chest pain.   Gastrointestinal: Positive for abdominal pain and nausea.        Objective   Vital Signs:   /70 (BP Location: Left arm, Patient Position: Sitting, Cuff Size: Adult)   Pulse 89   Temp 97.2 °F (36.2 °C)   Resp 18   Ht 170.2 cm (67\")   Wt 77.8 kg (171 lb 9.6 oz)   SpO2 95%   BMI 26.88 kg/m²     Physical Exam  Vitals reviewed.   Constitutional:       Appearance: She is well-developed. She is ill-appearing.      Comments: Very weak being assisted to table   HENT:      Head: Normocephalic and atraumatic.      Right Ear: External ear normal.      Left Ear: External ear normal.   Eyes:      Conjunctiva/sclera: Conjunctivae normal.      Pupils: Pupils are equal, round, and reactive to light.   Cardiovascular:      Rate and Rhythm: Normal rate and regular rhythm.      Heart sounds: No murmur heard.  No friction rub. No gallop.    Pulmonary:      Effort: Pulmonary effort is normal.      Breath sounds: Normal breath sounds. No wheezing or rhonchi.   Abdominal:      General: Bowel sounds are normal.      Tenderness: There is abdominal tenderness. There is guarding.      Comments: Unable to lie flat  Technically difficult abdominal exam due to inability to lie flat and significant tenderness, which was generalized.   Musculoskeletal:      Right lower leg: No edema.      Left lower leg: No edema.   Skin:     General: Skin is warm and dry.   Neurological:      Mental Status: She is alert and oriented to person, place, and time.      Cranial Nerves: No cranial nerve deficit.      Gait: Gait abnormal (unsteady).   Psychiatric:         Mood and Affect: Affect normal.         Behavior: Behavior normal.         Thought Content: Thought content normal.          Result Review :            Procedures      Assessment and Plan    Diagnoses and all orders for this visit:    1. " Hematochezia (Primary)        - Assisted the patient to her vehicle where her daughter is taking her immediately to the ER.        - I discussed care and plan with her daughter after getting permission from the patient.         - Report called to the ER to SHERYL Stiles.        - I discussed concerns for acute GI bleed in addition to near syncopal episodes and recent malignant cancer diagnosis.  - Patient will follow up with me following discharge from ER/spinal  2. Near syncope    3. Intraductal papillary adenocarcinoma of left breast              Follow Up   Return for upon discharge from ED.  Patient was given instructions and counseling regarding her condition or for health maintenance advice. Please see specific information pulled into the AVS if appropriate.       Transcribed from ambient dictation for SHERYL Tracey by Dangelo Enriquez.  02/09/22   16:06 EST    Patient verbalized consent to the visit recording.

## 2022-02-10 ENCOUNTER — CONSULT (OUTPATIENT)
Dept: ONCOLOGY | Facility: HOSPITAL | Age: 72
End: 2022-02-10

## 2022-02-10 ENCOUNTER — DOCUMENTATION (OUTPATIENT)
Dept: ONCOLOGY | Facility: HOSPITAL | Age: 72
End: 2022-02-10

## 2022-02-10 ENCOUNTER — TELEPHONE (OUTPATIENT)
Dept: OCCUPATIONAL THERAPY | Facility: HOSPITAL | Age: 72
End: 2022-02-10

## 2022-02-10 ENCOUNTER — OFFICE VISIT (OUTPATIENT)
Dept: OTHER | Facility: HOSPITAL | Age: 72
End: 2022-02-10

## 2022-02-10 VITALS
RESPIRATION RATE: 18 BRPM | BODY MASS INDEX: 26.92 KG/M2 | OXYGEN SATURATION: 98 % | SYSTOLIC BLOOD PRESSURE: 144 MMHG | WEIGHT: 171.52 LBS | TEMPERATURE: 97.5 F | HEART RATE: 106 BPM | DIASTOLIC BLOOD PRESSURE: 84 MMHG | HEIGHT: 67 IN

## 2022-02-10 VITALS — HEIGHT: 67 IN | RESPIRATION RATE: 16 BRPM | BODY MASS INDEX: 26.92 KG/M2 | WEIGHT: 171.52 LBS

## 2022-02-10 DIAGNOSIS — D05.12 INTRADUCTAL PAPILLARY ADENOCARCINOMA OF LEFT BREAST: Primary | ICD-10-CM

## 2022-02-10 DIAGNOSIS — C43.52: ICD-10-CM

## 2022-02-10 DIAGNOSIS — C50.919 MALIGNANT NEOPLASM OF FEMALE BREAST, UNSPECIFIED ESTROGEN RECEPTOR STATUS, UNSPECIFIED LATERALITY, UNSPECIFIED SITE OF BREAST: Primary | ICD-10-CM

## 2022-02-10 PROCEDURE — 99215 OFFICE O/P EST HI 40 MIN: CPT | Performed by: INTERNAL MEDICINE

## 2022-02-10 PROCEDURE — G0463 HOSPITAL OUTPT CLINIC VISIT: HCPCS | Performed by: INTERNAL MEDICINE

## 2022-02-10 PROCEDURE — 99203 OFFICE O/P NEW LOW 30 MIN: CPT | Performed by: SURGERY

## 2022-02-10 NOTE — PROGRESS NOTES
Chief Complaint: Breast Cancer    Subjective         History of Present Illness  Татьяна Jackson is a 71 y.o. female presents to [facility] to be seen for left breast 3 x 5 cm mass at 2:00 about 8 cm from the nipple.  It was biopsied and the imaging and pathology results are shown below.    Clinical Information    Breast mass   Comment:         Final Diagnosis   Left breast,  2 o'clock position, 8 cm from nipple, ultrasound-guided core biopsy:  - Fragments of papillary carcinoma, see comment               - Breast biomarker testing:                            - Estrogen Receptor (ER):  Positive (95%, strong to moderate)                            - Progesterone Receptor (PgR):  Positive (10%, moderate to weak)     Narrative & Impression   PROCEDURE:  MAMMO DIAGNOSTIC DIGITAL TOMOSYNTHESIS BILATERAL W CAD, 1/12/2022, 9:41  US BREAST BILATERAL LIMITED, 1/12/2022, 9:59     COMPARISON:  None  INDICATIONS:  LT BREAST MASS, RT SCREENING     FINDINGS:          Right breast:  1 cm oval mass upper-outer right breast middle 3rd depth corresponds with a 1 x 0.4   cm septated cyst at the 10 o'clock position 4 cm from the nipple on diagnostic ultrasound.  There   are no suspicious microcalcifications in the right breast.     Left breast:  3 x 5 cm macrolobular mass (palpable) with surrounding high-density irregular   asymmetry and spiculation in the upper-outer left breast posterior depth with possible irregular   asymmetry extending towards the chest wall.  A 1 cm satellite lesion immediately inferior to the   index lesion.  There are several calcifications internal to the index lesion.  High-resolution focused left breast ultrasound at the 2 o'clock position 8 cm from the nipple   demonstrates a hyper vascular at 3 x 5 cm mass with an adjacent 1 cm nodular satellite lesion.  Left axillary sonography demonstrates several lymph nodes with the well preserved fatty hilum one   of which  measures 1.3 x 0.4 cm with mildly thickened cortex which is borderline.     IMPRESSION:  Left breast:  3 x 5 cm irregular mass 0200 hours 8 cm from the nipple is highly suspicious for   malignancy and recommend ultrasound-guided core biopsy for definitive diagnosis.     Left axilla:  Recommend ultrasound-guided tissue sampling of the borderline left axillary lymph   node.     Right axillary tail:  There are 2 sub cm circumscribed masses best seen on the right MLO view.    Recommend focused ultrasound as this was not performed on the day of the diagnostic workup.     Right breast:  No other suspicious interval changes of the right breast.     ADDENDUM:      final pathology:  Left breast:  Ultrasound-guided core biopsy at the 2 o'clock position 8 cm from the nipple-  Fragments of papillary carcinoma.  Concordant.  The above positive malignant diagnosis was called to Lilia in the office of Jenni Petersen on 2/3/2022   at 0917 hours by department of pathology.  Recommend surgical and oncologic consultation.      Objective     Past Medical History:   Diagnosis Date   • Colitis    • Ulcerative colitis (HCC)        Past Surgical History:   Procedure Laterality Date   • COLONOSCOPY      [place]-doesn't know name of hospital          Current Outpatient Medications:   •  ascorbic acid (VITAMIN C) 500 MG tablet, Take 500 mg by mouth., Disp: , Rfl:   •  multivitamin (THERAGRAN) tablet tablet, Take 1 tablet by mouth., Disp: , Rfl:   •  Sodium Sulfate-Mag Sulfate-KCl (Sutab) 1650-761-886 MG tablet, Take 12 tablets by mouth Take As Directed. Take 12 tablets at 6 pm and 12 tablets 4 hours prior to procedure., Disp: 24 tablet, Rfl: 0  •  vitamin D3 (vitamin d) 125 MCG (5000 UT) capsule capsule, Take 5,000 Units by mouth., Disp: , Rfl:     No Known Allergies     History reviewed. No pertinent family history.    Social History     Socioeconomic History   • Marital status:    Tobacco Use   • Smoking status: Never Smoker   •  "Smokeless tobacco: Never Used   Vaping Use   • Vaping Use: Never used   Substance and Sexual Activity   • Alcohol use: Not Currently   • Drug use: Not Currently       Vital Signs:   Resp 16   Ht 170.2 cm (67.01\")   Wt 77.8 kg (171 lb 8.3 oz)   BMI 26.86 kg/m²    Review of Systems    Physical Exam  Vitals and nursing note reviewed.   Constitutional:       Appearance: Normal appearance.   HENT:      Head: Normocephalic and atraumatic.   Eyes:      Extraocular Movements: Extraocular movements intact.      Pupils: Pupils are equal, round, and reactive to light.   Cardiovascular:      Pulses: Normal pulses.   Pulmonary:      Effort: Pulmonary effort is normal. No accessory muscle usage or respiratory distress.   Chest:   Breasts:      Right: Normal. No inverted nipple, nipple discharge, skin change, axillary adenopathy or supraclavicular adenopathy.      Left: Mass present. No inverted nipple, nipple discharge, skin change, axillary adenopathy or supraclavicular adenopathy.        Comments: 4 cm lesion in upper outer breast  Abdominal:      General: Abdomen is flat.      Palpations: Abdomen is soft.      Tenderness: There is no abdominal tenderness. There is no guarding.   Musculoskeletal:         General: No swelling, tenderness or deformity.      Cervical back: Neck supple.   Lymphadenopathy:      Upper Body:      Right upper body: No supraclavicular or axillary adenopathy.      Left upper body: No supraclavicular or axillary adenopathy.   Skin:     General: Skin is warm and dry.   Neurological:      General: No focal deficit present.      Mental Status: She is alert and oriented to person, place, and time.   Psychiatric:         Mood and Affect: Mood normal.         Thought Content: Thought content normal.          Result Review :               Assessment and Plan    Diagnoses and all orders for this visit:    1. Intraductal papillary adenocarcinoma of left breast (Primary)  -     MRI Breast Bilateral Screening " With & Without Contrast; Future  -     Ambulatory Referral to Plastic Surgery  -     Ambulatory Referral to Radiation Oncology      We are going to refer her to both plastics and radiation oncology.  We also can order a breast MRI.  I discussed all of her treatment options with her including lumpectomy with radiation as well as mastectomy with reconstruction and mastectomy without reconstruction.  She is unsure at this time and we discussed the benefits and risks of each and will see her back after she has seen these referrals and had her MRI  Follow Up   Return in about 11 days (around 2/21/2022).  Patient was given instructions and counseling regarding her condition or for health maintenance advice. Please see specific information pulled into the AVS if appropriate.         This document has been electronically signed by Deborah Guillaume MD  February 10, 2022 14:28 EST

## 2022-02-10 NOTE — PROGRESS NOTES
Diagnosis: Breast cancer    Reason for referral: Multi-disciplinary breast clinic    Comments: OSW met with pt and daughter in med onc multi-d breast clinic. Pt's son from [place] is listening in on the phone. Pt presented in a pleasant mood. Introduced myself and discussed OSW role/psychosocial services available. PHQ-9 completed today - score 0. Pt rates her level of distress a 2 today due to nervousness, worry, pain and sleep. Provided emotional support throughout, utilizing empathy and validating and normalizing identified emotions. Discussed opportunity for mental health services (counseling,psych) and/or support services ([id number] support, support groups). Pt did not express interest at this time, indicating she has great support from her daughter, son-in-law, and three grandchildren that she resides with (ages 11, 16, 17). Pt moved to [place] from [place] about two years ago. Pt's daughter is a teacher and works until 1430, therefore, pt is inquiring about transportation resources. Will look into seeing if pt's [employer] Medicare plan has transportation benefits. If not, discussed option to utilize [employer] transportation, which would be $6 roundtrip. If pt is unable to afford this expense, can assist pt in applying for financial assistance.   Daughter inquiring if pt decides to undergo lumpectomy/radiation, if someone would be available to sit with pt after her txs each day. Discussed this type of service would likely be private pay and OSW can provide resources of caregiver agencies that provide this service if needed. Pt respectfully declined, as she feel confident she will pursue mastectomy versus lumpectomy.  No other needs identified at this time. Provided my business card, encouraging OSW support remains available.    Services/Referrals Provided: Transportation resources     Update 2/16: Contacted pt via telephone to inform that unfortunately due to pt's insurance plan, OSW is unable to determine online whether or not  she has a transportation benefit. Encouraged pt to contact the phone number on the back of her insurance card to inquire, as the insurance company will not provide this information to me directly. Pt v/u and reports she will plan to contact them by tomorrow and get back in touch with OSW afterwards.  Pt expressed emotional distress in regards to her consultation earlier today with reconstruction surgeon. Provided pt with emotional support throughout and discussed alternative option for mastectomy bra/prosthesis if pt opts out of reconstruction surgery. Pt expressed relief and gratitude. OSW relayed this to nurse navigator as well. OSW support remains available.    Services/Referrals Provided: Emotional support, care coordination - nurse navigator

## 2022-02-11 ENCOUNTER — PATIENT ROUNDING (BHMG ONLY) (OUTPATIENT)
Dept: ONCOLOGY | Facility: HOSPITAL | Age: 72
End: 2022-02-11

## 2022-02-11 NOTE — PROGRESS NOTES
February 11, 2022    Hello, may I speak with Татьяна Jackson?    My name is Manda Miles.    I am  with [facility]  [address] [place] [address] [address]  [place] [address] [phone].    Before we get started may I verify your date of birth? 1950    I am calling to officially welcome you to our practice and ask about your recent visit. Is this a good time to talk? NO- Left Voicemail    Tell me about your visit with us. What things went well?         We're always looking for ways to make our patients' experiences even better. Do you have recommendations on ways we may improve?  NO- Left Voicemail    Overall were you satisfied with your first visit to our practice? NO- Left Voicemail       I appreciate you taking the time to speak with me today. Is there anything else I can do for you? NO- Left Voicemail      Thank you, and have a great day.

## 2022-02-14 ENCOUNTER — CONSULT (OUTPATIENT)
Dept: RADIATION ONCOLOGY | Facility: HOSPITAL | Age: 72
End: 2022-02-14

## 2022-02-14 VITALS
WEIGHT: 174.6 LBS | TEMPERATURE: 97.1 F | BODY MASS INDEX: 27.34 KG/M2 | DIASTOLIC BLOOD PRESSURE: 67 MMHG | SYSTOLIC BLOOD PRESSURE: 158 MMHG | OXYGEN SATURATION: 100 % | HEART RATE: 105 BPM | RESPIRATION RATE: 16 BRPM

## 2022-02-14 DIAGNOSIS — D05.12 INTRADUCTAL PAPILLARY ADENOCARCINOMA OF LEFT BREAST: Primary | ICD-10-CM

## 2022-02-14 PROCEDURE — G0463 HOSPITAL OUTPT CLINIC VISIT: HCPCS | Performed by: RADIOLOGY

## 2022-02-14 PROCEDURE — 99204 OFFICE O/P NEW MOD 45 MIN: CPT | Performed by: RADIOLOGY

## 2022-02-14 NOTE — PROGRESS NOTES
New Patient Office Visit      Encounter Date: 02/14/2022   Patient Name: Татьяна Jackson  YOB: 1950   Medical Record Number: [id number]   Primary Diagnosis: Intraductal papillary adenocarcinoma of left breast [D05.12]       Chief Complaint:    Chief Complaint   Patient presents with   • Consult   • Breast Cancer       History of Present Illness: Татьяна Jackson is a 71-year-old female with invasive papillary carcinoma of the left breast who is seen in consultation regarding radiotherapy as a component of adjuvant management.  Ms. Jackson underwent CT scan of the abdomen and pelvis on 12/30/2021 for complaints of abdominal bloating and diarrhea.  This scan revealed a 2.8 x 2.5 cm asymmetric density in the inferior left breast.  Diagnostic mammography performed on 1/12/2022 revealed a 3 x 5 macrolobulated mass with surrounding high density irregular asymmetry and spiculation in the upper outer left breast, posterior depth with possible irregular asymmetry extending towards the chest wall.  There was a 1 cm satellite lesion immediately inferior to the index lesion and several calcifications internal to the index lesion.  Ultrasound performed the same day revealed several left axillary lymph nodes with preserved fatty hilum and one which measured 1.3 x 0.4 cm with mildly thickened cortex.  Pathology from ultrasound-guided biopsy performed on 1/31/2022 revealed fragments of papillary carcinoma, ER positive/AL moderately to weakly positive.  MRI of the breasts performed on 2/10/2022 revealed a 5 cm mass in the upper outer quadrant of the left breast consistent with known malignancy and no findings within the right breast.  Ms. Jackson reports feeling well overall with no complaints other than occasional abdominal bloating related to a history of ulcerative colitis.  She denies bone pain, back pain, weight loss, headaches or vision changes.    Subjective          Review of Systems: Review of  Systems   Constitutional: Positive for appetite change (decreased) and fatigue. Negative for unexpected weight change.   HENT: Negative for sore throat, tinnitus and trouble swallowing.    Eyes: Negative for visual disturbance.   Respiratory: Negative for cough and shortness of breath.    Cardiovascular: Negative for chest pain and palpitations.   Gastrointestinal: Negative for constipation, diarrhea and nausea.   Genitourinary: Negative for difficulty urinating, dysuria, frequency and urgency.   Musculoskeletal: Positive for arthralgias. Negative for back pain.   Skin: Negative for color change.   Neurological: Negative for dizziness and headaches.   Psychiatric/Behavioral: Negative for agitation, sleep disturbance and suicidal ideas.       Past Medical History:   Past Medical History:   Diagnosis Date   • Arthritis    • Breast cancer (HCC)    • Colitis    • H/O umbilical hernia repair    • Ulcerative colitis (HCC)        Past Surgical History:   Past Surgical History:   Procedure Laterality Date   • COLONOSCOPY      [place]-doesn't know name of hospital    • UPPER GASTROINTESTINAL ENDOSCOPY         Family History:   Family History   Problem Relation Age of Onset   • Cancer Sister    • Diabetes Mother        Social History:   Social History     Socioeconomic History   • Marital status:    Tobacco Use   • Smoking status: Never Smoker   • Smokeless tobacco: Never Used   Vaping Use   • Vaping Use: Never used   Substance and Sexual Activity   • Alcohol use: Not Currently   • Drug use: Never   • Sexual activity: Defer       Medications:     Current Outpatient Medications:   •  ascorbic acid (VITAMIN C) 500 MG tablet, Take 500 mg by mouth., Disp: , Rfl:   •  multivitamin (THERAGRAN) tablet tablet, Take 1 tablet by mouth., Disp: , Rfl:   •  Sodium Sulfate-Mag Sulfate-KCl (Sutab) 9462-320-644 MG tablet, Take 12 tablets by mouth Take As Directed. Take 12 tablets at 6 pm and 12 tablets 4 hours prior to  procedure., Disp: 24 tablet, Rfl: 0  •  vitamin D3 (vitamin d) 125 MCG (5000 UT) capsule capsule, Take 5,000 Units by mouth., Disp: , Rfl:     Allergies:   No Known Allergies    Pain: (on a scale of 0-10)   Pain Score    02/14/22 1437   PainSc: 0-No pain       Advanced Care Plan: N   Quality of Life: 100 - Full Activity    Objective     Physical Exam:   Vital Signs:   Vitals:    02/14/22 1437   BP: 158/67   Pulse: 105   Resp: 16   Temp: 97.1 °F (36.2 °C)   TempSrc: Temporal   SpO2: 100%   Weight: 79.2 kg (174 lb 9.7 oz)   PainSc: 0-No pain     Body mass index is 27.34 kg/m².     Physical Exam  Constitutional:       General: She is not in acute distress.     Appearance: Normal appearance. She is normal weight. She is not ill-appearing or toxic-appearing.   HENT:      Head: Normocephalic and atraumatic.      Nose: Nose normal.   Pulmonary:      Effort: Pulmonary effort is normal. No respiratory distress.   Abdominal:      General: Abdomen is flat. There is no distension.      Palpations: Abdomen is soft.   Skin:     General: Skin is warm and dry.   Neurological:      General: No focal deficit present.      Mental Status: She is alert and oriented to person, place, and time.      Cranial Nerves: No cranial nerve deficit.      Gait: Gait normal.   Psychiatric:         Mood and Affect: Mood normal.         Behavior: Behavior normal.         Judgment: Judgment normal.         Results:   Radiographs: Mammo Post Clip Placement Left    Addendum Date: 2/7/2022    ADDENDUM:  final pathology: Left breast:  Ultrasound-guided core biopsy at the 2 o'clock position 8 cm from the nipple- Fragments of papillary carcinoma.  Concordant. The above positive malignant diagnosis was called to Lilia in the office of Jenni Petersen on 2/3/2022 at 0917 hours by department of pathology.  Recommend surgical and oncologic consultation.    Darío Tom M.D.       Electronically Signed and Approved By: Darío Tom M.D. on 2/07/2022 at  16:40             Result Date: 2/7/2022   Technically successful ultrasound guided core biopsy of the 5 cm mass 2 o'clock position of the left breast. The patient tolerated the procedure well without immediate complication. Specimens have been sent to pathology for further analysis.  Addendum will be dictated upon receiving final pathology for concordance and recommendations.     Darío Tom M.D.       Electronically Signed and Approved By: Darío Tom M.D. on 1/31/2022 at 15:14             US Breast Right Limited    Result Date: 1/31/2022  2 mm circumscribed mass in the right axillary tail corresponds to 1 of 2 of the mammographic lesions.  Etiologies would include intramammary lymph nodes.  Given that the patient is scheduled for ultrasound-guided core biopsy of a suspicious mass in the left breast it may be prudent for 6 month follow-up right breast mammogram.   RECOMMENDATION(S):  SHORT TERM FOLLOW-UP DIAGNOSTIC MAMMOGRAM RIGHT BREAST IN 6 MONTHS.   BIRADS:  DIAGNOSTIC CATEGORY 3--PROBABLY BENIGN FINDING.   PLEASE NOTE:  THE AMERICAN CANCER SOCIETY NOW RECOMMENDS THAT ALL WOMEN WITH >20% LIFETIME RISK OF BREAST CANCER UNDERGO ANNUAL SCREENING BREAST MRI AND WOMEN WITH 15-20% SPEAK WITH THEIR DOCTORS ABOUT ANNUAL SCREENING BREAST MRI.  A NORMAL ULTRASOUND EXAMINATION DOES NOT EXCLUDE THE POSSIBILITY OF BREAST CANCER.  A CLINICALLY SUSPICIOUS PALPABLE LUMP SHOULD BE BIOPSIED.     Darío Tom M.D.       Electronically Signed and Approved By: Darío Tom M.D. on 1/31/2022 at 13:26             US Guided Breast Biopsy With & Without Device initial    Addendum Date: 2/7/2022    ADDENDUM:  final pathology: Left breast:  Ultrasound-guided core biopsy at the 2 o'clock position 8 cm from the nipple- Fragments of papillary carcinoma.  Concordant. The above positive malignant diagnosis was called to Lilia in the office of Jenni Petersen on 2/3/2022 at 0917 hours by department of pathology.  Recommend  surgical and oncologic consultation.    Darío Tom M.D.       Electronically Signed and Approved By: Darío Tom M.D. on 2/07/2022 at 16:40             Result Date: 2/7/2022   Technically successful ultrasound guided core biopsy of the 5 cm mass 2 o'clock position of the left breast. The patient tolerated the procedure well without immediate complication. Specimens have been sent to pathology for further analysis.  Addendum will be dictated upon receiving final pathology for concordance and recommendations.     Darío Tom M.D.       Electronically Signed and Approved By: Darío Tom M.D. on 1/31/2022 at 15:14           I personally reviewed the CT scan of the abdomen and pelvis performed on 12/30/2021 as well as the MRI of the breast performed on 2/10/2022.      Pathology: I personally reviewed the pathology report from the procedure performed on 1/31/2022.  The pertinent findings are as above in HPI.  Labs:   WBC   Date Value Ref Range Status   10/07/2021 6.65 3.40 - 10.80 10*3/mm3 Final   08/27/2019 7.7 4.5 - 12.5 x10*3/uL Final     Hemoglobin   Date Value Ref Range Status   10/07/2021 14.3 12.0 - 15.9 g/dL Final   08/27/2019 14.4 12.0 - 16.0 g/dL Final     Hematocrit   Date Value Ref Range Status   10/07/2021 43.9 34.0 - 46.6 % Final   08/27/2019 45.2 36.0 - 48.0 % Final     Platelets   Date Value Ref Range Status   10/07/2021 148 140 - 450 10*3/mm3 Final     External Platelets   Date Value Ref Range Status   08/27/2019 263 150 - 450 x10*3/uL Final    No results found for: PSA No results found for: CEA     Assessment / Plan          Assessment/Plan:   Татьяна Jackson is a 71-year-old female with cT2 cN1 cM0 papillary carcinoma ER positive/WI positive of the left breast.  She has no clinical evidence of distant metastatic disease.  ECOG 0    I discussed the clinical, radiographic and pathologic findings today with Ms. Jackson.  I explained the role of radiotherapy in the management of  breast conserving therapy, outlining the rationale for this approach.  We discussed the risks, benefits and alternatives to radiation.  We additionally discussed the life threatening nature of untreated breast cancer.  Ms. Jackson will consider her options with regard to mastectomy versus breast conserving therapy.  She was encouraged to contact me with any questions or concerns regarding her care.        Ritesh Castro MD  Radiation Oncology  [facility]    This document has been signed by Ritesh Castro MD on February 17, 2022 11:03 EST

## 2022-02-15 ENCOUNTER — HOSPITAL ENCOUNTER (OUTPATIENT)
Dept: MRI IMAGING | Facility: HOSPITAL | Age: 72
Discharge: HOME | End: 2022-02-15
Admitting: SURGERY

## 2022-02-15 DIAGNOSIS — D05.12 INTRADUCTAL PAPILLARY ADENOCARCINOMA OF LEFT BREAST: ICD-10-CM

## 2022-02-15 PROCEDURE — A9577 INJ MULTIHANCE: HCPCS | Performed by: SURGERY

## 2022-02-15 PROCEDURE — C8937 CAD BREAST MRI: HCPCS

## 2022-02-15 PROCEDURE — C8908 MRI W/O FOL W/CONT, BREAST,: HCPCS

## 2022-02-15 PROCEDURE — 0 GADOBENATE DIMEGLUMINE 529 MG/ML SOLUTION: Performed by: SURGERY

## 2022-02-15 RX ADMIN — GADOBENATE DIMEGLUMINE 15 ML: 529 INJECTION, SOLUTION INTRAVENOUS at 13:16

## 2022-02-15 NOTE — PROGRESS NOTES
"Consult (Breast recon)            History of Present Illness  Татьяна Jackson is a 71 y.o. female who presents to [facility] as a consult from Dr More from breast cancer center  to discuss breast reconstructive options.  She is planned to have left breast skin sparing mastectomy and comes to be evaluated for reconstruction. She is a C cup and would like to be a B cup.    Subjective      PT wants Left breast removed and breast reconstruction   Cancer center referred Mrs. Jackson 1 week ago and told her about possible reconstruction on Left breast    Pt wants to keep nipple if possible     34c current size   34b wants this size     400 cc  Implant  Mastectomy, reduction/astopexy w implant      Patient has no known allergies.  Allergies Reconciled.    Review of Systems  All system were reviewed and were negative, except the ones noted above.     Review of Systems     Objective     /94 (BP Location: Left arm, Patient Position: Sitting)   Pulse 95   Temp 97.5 °F (36.4 °C)   Ht 170.2 cm (67\")   Wt 78.5 kg (173 lb)   SpO2 99%   BMI 27.10 kg/m²     Body mass index is 27.1 kg/m².    Physical Exam  Physical exam:  Patient awake, alert, oriented  Respirations are non elaborated  Patient is not tachycardic    Breast exam:     Bilateral breast with grade 3 nipple ptosis, no palpable masses or tenderness   Axillary Lymphadenopathy: No axillary lymphadenopathy  SN-N (Right Breast): 27.5  SN-N (Left Breast): 28  N-IMF (Right Breast): 10  N-IMF (Left Breast): 10   Base Width (Right Breast): 14.5  Base Width (Left Breast): 17          Result Review :              Assessment and Plan      Diagnoses and all orders for this visit:    1. Intraductal papillary adenocarcinoma of left breast (Primary)        Additional Order(s):       Plan:  • Breast reconstruction options (Tissue Expander/Implant versus Autologous) were all discussed with the patient at length.  In " addition, we discussed options for symmetry procedures and nipple reconstruction.  The patient understands that her reconstructed breast will not have the same sensation as a natural breast and that visible incision lines will always be present.  In addition, patient understand that breast reconstruction is a multi-stage procedure that can take months to years to complete.   • We will send information for prior authorization.  Photos were obtained.   • Patient was given the breast reconstruction pamphlet as well as directed to the ASPS website for additional information.   • The patient was made aware that the FDA has discovered rare occurrences between an uncommon form of lymphoma (anaplastic large cell) and women with breast implants.  While the incidence is quite low, the risk of development is real; therefore, any unusual symptoms or signs (most commonly a late fluid collection years later) should be brought to the attention of your physician.  Patient also counseled on risks and benefits of saline versus silicone implants, lifting restrictions, scar placement, risk of capsular contracture, animation deformity, need for likely revision of breast implants at some point in her life, FDA recommendation of MRI every three years to monitor for rupture, and continued mammography for breast cancer surveillance.   • We will have patient obtain pre-op clearance from their PCP.  • We had a long discussion with the patient and her family today regarding her reconstructive options.  These include no reconstruction, reconstruction at the time of mastectomy or lumpectomy, and finally delayed reconstruction.  We discussed specific types of reconstruction, including: tissue expander and/or implants, and autologous reconstruction with either pedicled or free flap.  We also covered the later stages of reconstruction, including nipple reconstruction and areola tattooing, fat grafting, and symmetry procedures if indicated or  desired.   • I described the typical stefan-operative course of each procedure, including the nature of the procedure, hospital stay, necessity for drains, post-operative activity restriction, and postoperative visits (including those for tissue expansion).  We also discussed the time frame for reconstruction.  I shared information about saline vs. silicone implants, including their differences, risk of capsular contracture, rippling, or leak/rupture, and safety/monitoring issues.  I described Alloderm and its use for implant reconstruction. We discussed that radiation therapy, if she ultimately requires it, may alter the reconstructive options.     • We reviewed surgical risks including, but not limited to, infection, bleeding, hematoma, seroma, pain, scarring, numbness, skin or nipple loss, wound healing problems, flap failures including partial or complete flap loss, asymmetry, need for revisions or further surgeries,  and risks associated with anesthesia.   • Additional risks with autologous reconstruction include donor wound morbidities, such as hernias or bulges, wound healing problems, muscle weakness, partial or complete flap loss, and typical surgical risks as listed above.   • The use of biological mesh is not for soft tissue reinforcement. The use of mesh is necessary because the mastectomy dissection pocket is larger than the implant itself. The intention of the mesh is to restrain the displacement of the implant to the axilla, which is a very common complication requiring revision. In my experience, the use of mesh avoids that specific complication and very often avoids a second surgery. The use of mesh also allows me to perform a safer procedure since it holds the implant in place and alleviates the pressure over the skin that depends only on the subdermal blood supply. Without the mesh, I believe I wouldn't be able to perform direct implant reconstruction and give the optimal result that patient desires  and deserves.   •  Specifically on her case,  I recommend left skin sparing mastectomy due to the ptotic nipple . I also recommend right lift with reduction and placement of a small implant to match the base on the mastectomy implant based reconstruction.   • Consent:   Bilateral breast reconstruction with right breast reduction/mastopexy with implant, left implant and mesh, possible expander, use of spy.   • Target implant size: 415 mp on left, 300mp low  on right cc  •   • I spent 90 minutes explaining to patient about her procedure and after care.   •   • CPT codes:   • 97441-47 - immediate insertion of breast prosthesis following reconstruction   • 44710-  implantation of biologic implant  • 25137- intravenous injection of agent (SPY)  • 94427- breast reduction  •   • Implants:  • Sientra Smooth round gel implant  MP 27386-430, 435, 455 x1  - 32900 ,  300, 320 x1         • Expander: [id number] x2  • Single use sizer [id number] x1              galaflex [id number] x2   ·     OR time: 3 hours    • Dannielle Wilhelm MD, PhD  • NPI: [id number]    Women's Health and Cancer Rights Act (WHCRA)  The Women's Health and Cancer Rights Act of 1998 (WHCRA) is a federal law that provides protections to patients who choose to have breast reconstruction in connection with a mastectomy.  Coverage must be provided for:    1.All stages of reconstruction of the breast on which the mastectomy has been performed;  2.Surgery and reconstruction of the other breast to produce a symmetrical appearance; and  Prostheses and treatment of physical complications of all stages of the mastectomy, including lymphedema.    This law applies to two different types of coverage:    1.Group health plans (provided by an employer or union);  2.Individual health insurance policies (not based on employment).      I spent 60 minutes caring for Татьяна on this date of service. This time includes time spent by me in the following activities:performing a  medically appropriate examination and/or evaluation , counseling and educating the patient/family/caregiver, documenting information in the medical record, and care coordination    Follow Up     No follow-ups on file.    Patient was given instructions and counseling regarding her condition. Please see specific information pulled into the AVS if appropriate.     Dannielle Wilhelm MD  02/16/2022

## 2022-02-16 ENCOUNTER — PREP FOR SURGERY (OUTPATIENT)
Dept: OTHER | Facility: HOSPITAL | Age: 72
End: 2022-02-16

## 2022-02-16 ENCOUNTER — OFFICE VISIT (OUTPATIENT)
Dept: PLASTIC SURGERY | Facility: CLINIC | Age: 72
End: 2022-02-16

## 2022-02-16 ENCOUNTER — DOCUMENTATION (OUTPATIENT)
Dept: MAMMOGRAPHY | Facility: HOSPITAL | Age: 72
End: 2022-02-16

## 2022-02-16 VITALS
WEIGHT: 173 LBS | HEIGHT: 67 IN | OXYGEN SATURATION: 99 % | BODY MASS INDEX: 27.15 KG/M2 | DIASTOLIC BLOOD PRESSURE: 94 MMHG | HEART RATE: 95 BPM | TEMPERATURE: 97.5 F | SYSTOLIC BLOOD PRESSURE: 149 MMHG

## 2022-02-16 DIAGNOSIS — D05.12 INTRADUCTAL PAPILLARY ADENOCARCINOMA OF LEFT BREAST: Primary | ICD-10-CM

## 2022-02-16 PROCEDURE — 99205 OFFICE O/P NEW HI 60 MIN: CPT | Performed by: SURGERY

## 2022-02-16 RX ORDER — CEFAZOLIN SODIUM 2 G/100ML
2 INJECTION, SOLUTION INTRAVENOUS ONCE
Status: CANCELLED | OUTPATIENT
Start: 2022-02-16 | End: 2022-02-16

## 2022-02-16 NOTE — PROGRESS NOTES
NICK CALLED TO LET ME KNOW THAT PT S/W HER ABOUT HER SURGICAL DECISION. I CALLED PT TO SEE IF WE COULD GET HER BACK TO SEE DR. VALLE AND HER DAUGHTER WORKS UNTIL 230. I S/W DR. VALLE AND SHE WANTED TO GO AHEAD AND PUT PT ON FOR TOMORROW DURING CLINIC. I CALLED PT BACK AND SHE WAS FINE WITH THAT AND I THEN CONTACTED CULLEN TO SEE IF WE COULD MOVE HER APPT UP SO SHE COULD SEE HER BEFORE SURGERY AND THEN I CALLED NICK BACK TO LET HER KNOW THAT PT WILL BE THERE TOMORROW SO WE COULD ALL FOLLOW UP WITH HER THEN.

## 2022-02-17 ENCOUNTER — OFFICE VISIT (OUTPATIENT)
Dept: OTHER | Facility: HOSPITAL | Age: 72
End: 2022-02-17

## 2022-02-17 ENCOUNTER — OFFICE VISIT (OUTPATIENT)
Dept: OBSTETRICS AND GYNECOLOGY | Facility: CLINIC | Age: 72
End: 2022-02-17

## 2022-02-17 VITALS — HEIGHT: 67 IN | BODY MASS INDEX: 27.18 KG/M2 | WEIGHT: 173.19 LBS | RESPIRATION RATE: 16 BRPM

## 2022-02-17 VITALS
WEIGHT: 173 LBS | BODY MASS INDEX: 27.15 KG/M2 | DIASTOLIC BLOOD PRESSURE: 89 MMHG | HEART RATE: 90 BPM | SYSTOLIC BLOOD PRESSURE: 158 MMHG | HEIGHT: 67 IN

## 2022-02-17 DIAGNOSIS — Z01.419 WELL WOMAN EXAM: Primary | ICD-10-CM

## 2022-02-17 DIAGNOSIS — D05.12 INTRADUCTAL PAPILLARY ADENOCARCINOMA OF LEFT BREAST: Primary | ICD-10-CM

## 2022-02-17 PROCEDURE — 3015F CERV CANCER SCREEN DOCD: CPT | Performed by: NURSE PRACTITIONER

## 2022-02-17 PROCEDURE — G0101 CA SCREEN;PELVIC/BREAST EXAM: HCPCS | Performed by: NURSE PRACTITIONER

## 2022-02-17 PROCEDURE — 99213 OFFICE O/P EST LOW 20 MIN: CPT | Performed by: SURGERY

## 2022-02-17 RX ORDER — CLINDAMYCIN PHOSPHATE 900 MG/50ML
900 INJECTION INTRAVENOUS ONCE
Status: CANCELLED | OUTPATIENT
Start: 2022-02-17 | End: 2022-02-17

## 2022-02-17 NOTE — PROGRESS NOTES
Chief Complaint: Breast Cancer    Subjective         History of Present Illness  Татьяна Jackson is a 71 y.o. female presents to [facility] to be seen for left breast 3 x 5 cm mass at 2:00 about 8 cm from the nipple.  It was biopsied and the imaging and pathology results are shown below.    Clinical Information    Breast mass   Comment:         Final Diagnosis   Left breast,  2 o'clock position, 8 cm from nipple, ultrasound-guided core biopsy:  - Fragments of papillary carcinoma, see comment               - Breast biomarker testing:                            - Estrogen Receptor (ER):  Positive (95%, strong to moderate)                            - Progesterone Receptor (PgR):  Positive (10%, moderate to weak)     Narrative & Impression   PROCEDURE:  MAMMO DIAGNOSTIC DIGITAL TOMOSYNTHESIS BILATERAL W CAD, 1/12/2022, 9:41  US BREAST BILATERAL LIMITED, 1/12/2022, 9:59     COMPARISON:  None  INDICATIONS:  LT BREAST MASS, RT SCREENING     FINDINGS:          Right breast:  1 cm oval mass upper-outer right breast middle 3rd depth corresponds with a 1 x 0.4   cm septated cyst at the 10 o'clock position 4 cm from the nipple on diagnostic ultrasound.  There   are no suspicious microcalcifications in the right breast.     Left breast:  3 x 5 cm macrolobular mass (palpable) with surrounding high-density irregular   asymmetry and spiculation in the upper-outer left breast posterior depth with possible irregular   asymmetry extending towards the chest wall.  A 1 cm satellite lesion immediately inferior to the   index lesion.  There are several calcifications internal to the index lesion.  High-resolution focused left breast ultrasound at the 2 o'clock position 8 cm from the nipple   demonstrates a hyper vascular at 3 x 5 cm mass with an adjacent 1 cm nodular satellite lesion.  Left axillary sonography demonstrates several lymph nodes with the well preserved fatty hilum one   of which  measures 1.3 x 0.4 cm with mildly thickened cortex which is borderline.     IMPRESSION:  Left breast:  3 x 5 cm irregular mass 0200 hours 8 cm from the nipple is highly suspicious for   malignancy and recommend ultrasound-guided core biopsy for definitive diagnosis.     Left axilla:  Recommend ultrasound-guided tissue sampling of the borderline left axillary lymph   node.     Right axillary tail:  There are 2 sub cm circumscribed masses best seen on the right MLO view.    Recommend focused ultrasound as this was not performed on the day of the diagnostic workup.     Right breast:  No other suspicious interval changes of the right breast.     ADDENDUM:      final pathology:  Left breast:  Ultrasound-guided core biopsy at the 2 o'clock position 8 cm from the nipple-  Fragments of papillary carcinoma.  Concordant.  The above positive malignant diagnosis was called to Lilia in the office of Jenni Petersen on 2/3/2022   at 0917 hours by department of pathology.  Recommend surgical and oncologic consultation.      Objective     Past Medical History:   Diagnosis Date   • Arthritis    • Breast cancer (HCC)    • Colitis    • H/O umbilical hernia repair    • Ulcerative colitis (HCC)        Past Surgical History:   Procedure Laterality Date   • COLONOSCOPY      [place]-doesn't know name of hospital    • UPPER GASTROINTESTINAL ENDOSCOPY           Current Outpatient Medications:   •  ascorbic acid (VITAMIN C) 500 MG tablet, Take 500 mg by mouth., Disp: , Rfl:   •  multivitamin (THERAGRAN) tablet tablet, Take 1 tablet by mouth., Disp: , Rfl:   •  Sodium Sulfate-Mag Sulfate-KCl (Sutab) 0531-133-442 MG tablet, Take 12 tablets by mouth Take As Directed. Take 12 tablets at 6 pm and 12 tablets 4 hours prior to procedure., Disp: 24 tablet, Rfl: 0  •  vitamin D3 (vitamin d) 125 MCG (5000 UT) capsule capsule, Take 5,000 Units by mouth., Disp: , Rfl:     No Known Allergies     Family History   Problem Relation Age of Onset   • Cancer  "Sister    • Diabetes Mother        Social History     Socioeconomic History   • Marital status:    Tobacco Use   • Smoking status: Never Smoker   • Smokeless tobacco: Never Used   Vaping Use   • Vaping Use: Never used   Substance and Sexual Activity   • Alcohol use: Not Currently   • Drug use: Never   • Sexual activity: Defer       Vital Signs:   Resp 16   Ht 170.2 cm (67.01\")   Wt 78.6 kg (173 lb 3.1 oz)   BMI 27.12 kg/m²    Review of Systems    Physical Exam  Vitals and nursing note reviewed.   Constitutional:       Appearance: Normal appearance.   HENT:      Head: Normocephalic and atraumatic.   Eyes:      Extraocular Movements: Extraocular movements intact.      Pupils: Pupils are equal, round, and reactive to light.   Cardiovascular:      Pulses: Normal pulses.   Pulmonary:      Effort: Pulmonary effort is normal. No accessory muscle usage or respiratory distress.   Chest:   Breasts:      Right: Normal. No inverted nipple, nipple discharge, skin change, axillary adenopathy or supraclavicular adenopathy.      Left: Mass present. No inverted nipple, nipple discharge, skin change, axillary adenopathy or supraclavicular adenopathy.        Comments: 4 cm lesion in upper outer breast  Abdominal:      General: Abdomen is flat.      Palpations: Abdomen is soft.      Tenderness: There is no abdominal tenderness. There is no guarding.   Musculoskeletal:         General: No swelling, tenderness or deformity.      Cervical back: Neck supple.   Lymphadenopathy:      Upper Body:      Right upper body: No supraclavicular or axillary adenopathy.      Left upper body: No supraclavicular or axillary adenopathy.   Skin:     General: Skin is warm and dry.   Neurological:      General: No focal deficit present.      Mental Status: She is alert and oriented to person, place, and time.   Psychiatric:         Mood and Affect: Mood normal.         Thought Content: Thought content normal.        Narrative & Impression "   PROCEDURE:  MRI BREAST BILATERAL W WO CONTRAST     COMPARISON: [facility], , MAMMO POST CLIP PLACEMENT LEFT, 1/31/2022, 14:55.     INDICATIONS:  Left breast invasive papillary breast cancer, stage I/II/III, initial workup.     CONTRAST:      15ML  Multihance I.V.     TECHNIQUE:    Breast MRI was performed using dynamic intravenous gadolinium infusion, thin sections,   and a dedicated breast coil.  Contrast enhancement analysis was assisted by computer-aided   detection.       AMOUNT OF FIBROGLANDULAR TISSUE:        Scattered fibroglandular tissue        BACKGROUND PARENCHYMAL ENHANCEMENT:       Mild, symmetric     FINDINGS:  An adequate contrast bolus is evident.     Nonspecific enhancement is seen at the anterior lung bases which may represent pneumonia, but is   nonspecific.     5 cm enhancing mass with irregular margins is seen in the upper outer mid to posterior left breast   representing the lesion seen on mammography.  Magnetic susceptibility artifact from a localization   clip is evident.  Enhancing vessels radiate posteriorly to the chest wall.     No nipple abnormality is evident.  Asymmetry in the pectoral muscles on the left may be   developmental or be due to prior injury.     No internal mammary or axillary adenopathy is seen.    IMPRESSION:  Left breast:  5 cm mass in the upper outer quadrant consistent with known malignancy.     Right breast:  No MR findings of malignancy.     Nonspecific enhancement is seen at the lung bases, as above.               Assessment and Plan    Diagnoses and all orders for this visit:    1. Intraductal papillary adenocarcinoma of left breast (Primary)  -     NM sentinel node injection only; Future  -     Case Request; Standing  -     Case Request      We are going to refer her to both plastics and radiation oncology.    I discussed all of her treatment options with her including lumpectomy with radiation as well as mastectomy with reconstruction and  mastectomy without reconstruction.  She is unsure at this time and we discussed the benefits and risks of each and will see her back after she has seen these referrals and had her MRI--- which she has done.  She saw Dr. Pedersen as well as Dr. Castro and discussed both treatment options.  This time she feels that she would like to pursue neither would like to have bilateral mastectomy.      Follow Up   Return for Next scheduled followup after surgery.  Patient was given instructions and counseling regarding her condition or for health maintenance advice. Please see specific information pulled into the AVS if appropriate.         This document has been electronically signed by Deborah Guillaume MD  February 17, 2022 14:55 EST

## 2022-02-17 NOTE — PROGRESS NOTES
"  HPI:   71 y.o..Presents for well woman exam. Contraception or HRT: Post menopausal  Menses:  No vaginal bleeding.   Pain:  None  Last pap normal   Complaints: Pt has no complaints today.  Patient reports that she is not currently experiencing any symptoms of urinary incontinence.  Recently diagnosed with left breast cancer, scheduled for bilateral mastectomy, .    Past Medical History:   Diagnosis Date   • Arthritis    • Breast cancer (HCC)     left breast, diagnosed with breast cancer 2 weeks ago, [facility]   • Colitis    • H/O umbilical hernia repair    • Ulcerative colitis (HCC)       Past Surgical History:   Procedure Laterality Date   • COLONOSCOPY      [place]-doesn't know name of hospital    • UPPER GASTROINTESTINAL ENDOSCOPY        Family History   Problem Relation Age of Onset   • Cancer Sister         gallbladder   • Diabetes Mother         PCP: does manage PMHx and preventative labs      PHYSICAL EXAM: Chaperone present /89   Pulse 90   Ht 170.2 cm (67\")   Wt 78.5 kg (173 lb)   LMP  (LMP Unknown)   Breastfeeding No   BMI 27.10 kg/m²   General- NAD, alert and oriented, appropriate  Psych- Normal mood, good memory  Neck- No masses, no thyroid enlargement  Lymphatic- No palpable neck, axillary, or groin nodes  CV- Regular rhythm, no murmurs  Resp- CTA to bases, no wheezes  Abdomen- Soft, non distended, non tender, no masses  Breast left-  Bilaterally symmetrical, 4 cm mass at 3 o'clock position, 2 cm from areolar border, tender, firm, no nipple discharge  Breast right- Bilaterally symmetrical, no masses, non tender, no nipple discharge  External genitalia- Normal female, no lesions  Urethra/meatus- Normal, no masses, non tender, no prolapse  Bladder- Normal, no masses, non tender, no prolapse  Vagina- Normal, atrophic, no lesions, no discharge, no prolapse  Cvx- Normal, no lesions, no discharge, No cervical motion tenderness  Uterus- Normal size, shape & " consistency.  Non tender, mobile, & no prolapse  Adnexa- No mass, non tender  Anus/Rectum/Perineum- Not performed  Ext- No edema, no cyanosis    Skin- No lesions, no rashes, no acanthosis nigricans      ASSESSMENT and PLAN:    Diagnoses and all orders for this visit:    1. Well woman exam (Primary)      Preventative:   BREAST HEALTH- Monthly self breast exam importance and how to reviewed. MMG and/or MRI (prn) reviewed per society guidelines and her individual history. Screen: Updated today.  CERVICAL CANCER Screening- Reviewed current ASCCP guidelines for screening w and wo cotest HPV, age specific.  Screen: Not medically needed.  COLON CANCER Screening- Reviewed current medical society guidelines and options.  Screen:  scheduled upcoming for colonoscopy.  BONE HEALTH- Reviewed current medical society guidelines and options for testing, calcium and vit D intake.  Weight bearing exercise.  DEXA: Already up to date.  Follow up PCP/Specialist PMHx and Labs  [facility]: Does not qualify.  Declines COVID vaccine  Elevated blood pressure, fu pcp for mgmt    She understands the importance of having any ordered tests to be performed in a timely fashion.  The risks of not performing them include, but are not limited to, advanced cancer stages, bone loss from osteoporosis and/or subsequent increase in morbidity and/or mortality.  She is encouraged to review her results online and/or contact or office if she has questions.     Follow Up:  Return for as needed.        aJmila Chow, APRN  02/17/2022

## 2022-02-18 ENCOUNTER — TELEPHONE (OUTPATIENT)
Dept: OCCUPATIONAL THERAPY | Facility: HOSPITAL | Age: 72
End: 2022-02-18

## 2022-02-18 DIAGNOSIS — Z01.818 ENCOUNTER FOR PREOPERATIVE ASSESSMENT: ICD-10-CM

## 2022-02-18 DIAGNOSIS — C50.919 MALIGNANT NEOPLASM OF FEMALE BREAST, UNSPECIFIED ESTROGEN RECEPTOR STATUS, UNSPECIFIED LATERALITY, UNSPECIFIED SITE OF BREAST: Primary | ICD-10-CM

## 2022-02-25 ENCOUNTER — TELEPHONE (OUTPATIENT)
Dept: GASTROENTEROLOGY | Facility: CLINIC | Age: 72
End: 2022-02-25

## 2022-03-01 ENCOUNTER — PREP FOR SURGERY (OUTPATIENT)
Dept: OTHER | Facility: HOSPITAL | Age: 72
End: 2022-03-01

## 2022-03-01 NOTE — PRE-PROCEDURE INSTRUCTIONS
Called patient to discuss instructions for laxative. Patient stated understanding for 2 part prep. Discussed skin prep, clear liquid diet, and pre-op medications. Patient aware she wont take any medications.  Patient stated understanding, No other issues or concerns noted currently per patient.

## 2022-03-02 ENCOUNTER — TELEPHONE (OUTPATIENT)
Dept: SURGERY | Facility: CLINIC | Age: 72
End: 2022-03-02

## 2022-03-02 ENCOUNTER — APPOINTMENT (OUTPATIENT)
Dept: OCCUPATIONAL THERAPY | Facility: HOSPITAL | Age: 72
End: 2022-03-02

## 2022-03-02 NOTE — TELEPHONE ENCOUNTER
Moi called from [facility].  Patient is scheduled for mastectomy with Dr. Guillaume, and reconstruction with Dr. Wilhelm on 03/08.      The patient told her she does not want reconstruction, and does not want with Dr. Wilhelm.  Moi is going to call office of Dr. Wilhelm to make aware, but didn't know if Dr. Guillaume was aware.

## 2022-03-02 NOTE — NURSING NOTE
Pt stated that she has decided that she only wants the mastectomy and does not want reconstructive surgery at this time. I spoke with Rosa Isela (Aundrea) to make sure that Aundrea is aware of this. I also left a vm for Angelia Keys) regarding this same matter. Requested that Angelia give me a call back to confirm that she did receive my message.

## 2022-03-03 ENCOUNTER — ANESTHESIA EVENT (OUTPATIENT)
Dept: GASTROENTEROLOGY | Facility: HOSPITAL | Age: 72
End: 2022-03-03

## 2022-03-03 ENCOUNTER — ANESTHESIA (OUTPATIENT)
Dept: GASTROENTEROLOGY | Facility: HOSPITAL | Age: 72
End: 2022-03-03

## 2022-03-03 ENCOUNTER — HOSPITAL ENCOUNTER (OUTPATIENT)
Facility: HOSPITAL | Age: 72
Setting detail: HOSPITAL OUTPATIENT SURGERY
Discharge: HOME | End: 2022-03-03
Attending: INTERNAL MEDICINE | Admitting: INTERNAL MEDICINE

## 2022-03-03 VITALS
DIASTOLIC BLOOD PRESSURE: 86 MMHG | HEART RATE: 81 BPM | HEIGHT: 67 IN | SYSTOLIC BLOOD PRESSURE: 139 MMHG | OXYGEN SATURATION: 99 % | TEMPERATURE: 97.8 F | RESPIRATION RATE: 18 BRPM | BODY MASS INDEX: 26.78 KG/M2 | WEIGHT: 170.64 LBS

## 2022-03-03 DIAGNOSIS — K51.00 ULCERATIVE PANCOLITIS WITHOUT COMPLICATION: ICD-10-CM

## 2022-03-03 DIAGNOSIS — K59.01 SLOW TRANSIT CONSTIPATION: ICD-10-CM

## 2022-03-03 PROCEDURE — 45385 COLONOSCOPY W/LESION REMOVAL: CPT | Performed by: INTERNAL MEDICINE

## 2022-03-03 PROCEDURE — 25010000002 PROPOFOL 10 MG/ML EMULSION: Performed by: NURSE ANESTHETIST, CERTIFIED REGISTERED

## 2022-03-03 PROCEDURE — 88305 TISSUE EXAM BY PATHOLOGIST: CPT | Performed by: INTERNAL MEDICINE

## 2022-03-03 PROCEDURE — 45380 COLONOSCOPY AND BIOPSY: CPT | Performed by: INTERNAL MEDICINE

## 2022-03-03 RX ORDER — PROPOFOL 10 MG/ML
VIAL (ML) INTRAVENOUS AS NEEDED
Status: DISCONTINUED | OUTPATIENT
Start: 2022-03-03 | End: 2022-03-03 | Stop reason: SURG

## 2022-03-03 RX ORDER — SODIUM CHLORIDE, SODIUM LACTATE, POTASSIUM CHLORIDE, CALCIUM CHLORIDE 600; 310; 30; 20 MG/100ML; MG/100ML; MG/100ML; MG/100ML
30 INJECTION, SOLUTION INTRAVENOUS CONTINUOUS
Status: DISCONTINUED | OUTPATIENT
Start: 2022-03-03 | End: 2022-03-03 | Stop reason: HOSPADM

## 2022-03-03 RX ORDER — LIDOCAINE HYDROCHLORIDE 20 MG/ML
INJECTION, SOLUTION INFILTRATION; PERINEURAL AS NEEDED
Status: DISCONTINUED | OUTPATIENT
Start: 2022-03-03 | End: 2022-03-03 | Stop reason: SURG

## 2022-03-03 RX ADMIN — PROPOFOL 125 MCG/KG/MIN: 10 INJECTION, EMULSION INTRAVENOUS at 09:37

## 2022-03-03 RX ADMIN — LIDOCAINE HYDROCHLORIDE 60 MG: 20 INJECTION, SOLUTION INFILTRATION; PERINEURAL at 09:37

## 2022-03-03 RX ADMIN — PROPOFOL 80 MG: 10 INJECTION, EMULSION INTRAVENOUS at 09:37

## 2022-03-03 RX ADMIN — SODIUM CHLORIDE, POTASSIUM CHLORIDE, SODIUM LACTATE AND CALCIUM CHLORIDE 30 ML/HR: 600; 310; 30; 20 INJECTION, SOLUTION INTRAVENOUS at 08:39

## 2022-03-03 NOTE — H&P
"Pre Procedure History & Physical    Chief Complaint:   Ulcerative colitis, generalized abdominal pain    Subjective     HPI:   72 yo F here for eval of ulcerative colitis, generalized abdominal pain.    Past Medical History:   Past Medical History:   Diagnosis Date   • Arthritis    • Breast cancer (HCC)     left breast, diagnosed with breast cancer 2 weeks ago, [facility]   • Ulcerative colitis (HCC)        Past Surgical History:  Past Surgical History:   Procedure Laterality Date   • COLONOSCOPY      [place]-doesn't know name of hospital    • HERNIA REPAIR      UMBILICAL HERNIA REPAIR   • UPPER GASTROINTESTINAL ENDOSCOPY         Family History:  Family History   Problem Relation Age of Onset   • Cancer Sister         gallbladder   • Diabetes Mother    • Malig Hyperthermia Neg Hx        Social History:   reports that she has never smoked. She has never used smokeless tobacco. She reports previous alcohol use. She reports that she does not use drugs.    Medications:   Medications Prior to Admission   Medication Sig Dispense Refill Last Dose   • ascorbic acid (VITAMIN C) 500 MG tablet Take 500 mg by mouth.   2/28/2022 at Unknown time   • multivitamin (THERAGRAN) tablet tablet Take 1 tablet by mouth.   2/28/2022 at Unknown time   • vitamin D3 (vitamin d) 125 MCG (5000 UT) capsule capsule Take 5,000 Units by mouth.   2/28/2022 at Unknown time   • Sodium Sulfate-Mag Sulfate-KCl (Sutab) 0813-413-010 MG tablet Take 12 tablets by mouth Take As Directed. Take 12 tablets at 6 pm and 12 tablets 4 hours prior to procedure. 24 tablet 0        Allergies:  Patient has no known allergies.    ROS:    Pertinent items are noted in HPI     Objective     Blood pressure 180/97, pulse 90, temperature 96.6 °F (35.9 °C), temperature source Temporal, resp. rate 18, height 170.2 cm (67\"), weight 77.4 kg (170 lb 10.2 oz), SpO2 99 %, not currently breastfeeding.    Physical Exam   Constitutional: Pt is oriented to person, place, " and time and well-developed, well-nourished, and in no distress.   Mouth/Throat: Oropharynx is clear and moist.   Neck: Normal range of motion.   Cardiovascular: Normal rate, regular rhythm and normal heart sounds.    Pulmonary/Chest: Effort normal and breath sounds normal.   Abdominal: Soft. Nontender  Skin: Skin is warm and dry.   Psychiatric: Mood, memory, affect and judgment normal.     Assessment/Plan     Diagnosis:  Ulcerative colitis, generalized abdominal pain      Anticipated Surgical Procedure:  Colonoscopy    The risks, benefits, and alternatives of this procedure have been discussed with the patient or the responsible party- the patient understands and agrees to proceed.

## 2022-03-03 NOTE — ANESTHESIA PREPROCEDURE EVALUATION
Anesthesia Evaluation     Patient summary reviewed and Nursing notes reviewed   no history of anesthetic complications:  NPO Solid Status: > 8 hours  NPO Liquid Status: > 2 hours           Airway   Mallampati: I  TM distance: >3 FB  No difficulty expected  Dental - normal exam     Pulmonary - negative pulmonary ROS and normal exam   Cardiovascular - normal exam  Exercise tolerance: good (4-7 METS)    (+) hypertension,       Neuro/Psych- negative ROS  GI/Hepatic/Renal/Endo    (+)  GI bleeding lower ,     ROS Comment: Ulcerative colitis    Musculoskeletal     Abdominal  - normal exam   Substance History - negative use     OB/GYN negative ob/gyn ROS         Other   arthritis,    history of cancer (breast)                    Anesthesia Plan    ASA 3     general   (Total IV Anesthesia    Patient understands anesthesia not responsible for dental damage.  )  intravenous induction     Anesthetic plan, all risks, benefits, and alternatives have been provided, discussed and informed consent has been obtained with: patient.    Plan discussed with CRNA.        CODE STATUS:

## 2022-03-03 NOTE — ANESTHESIA POSTPROCEDURE EVALUATION
Patient: Татьяна Jackson    Procedure Summary     Date: 03/03/22 Room / Location: [facility] ENDOSCOPY 2 / [facility] ENDOSCOPY    Anesthesia Start: 0935 Anesthesia Stop: 1004    Procedure: COLONOSCOPY WITH BX, POLYPECTOMIES, HOT SNARE (N/A ) Diagnosis:       Ulcerative pancolitis without complication (HCC)      Slow transit constipation      (Ulcerative pancolitis without complication (HCC) [K51.00])      (Slow transit constipation [K59.01])    Surgeons: Margie Dotson MD Provider: Raya Garcia DO    Anesthesia Type: general ASA Status: 3          Anesthesia Type: general    Vitals  Vitals Value Taken Time   /86 03/03/22 1027   Temp 36.6 °C (97.8 °F) 03/03/22 1027   Pulse 81 03/03/22 1027   Resp 18 03/03/22 1027   SpO2 99 % 03/03/22 1027           Post Anesthesia Care and Evaluation    Patient location during evaluation: bedside  Patient participation: complete - patient participated  Level of consciousness: awake  Pain management: adequate  Airway patency: patent  Anesthetic complications: No anesthetic complications  PONV Status: none  Cardiovascular status: acceptable and stable  Respiratory status: acceptable  Hydration status: acceptable    Comments: An Anesthesiologist personally participated in the most demanding procedures (including induction and emergence if applicable) in the anesthesia plan, monitored the course of anesthesia administration at frequent intervals and remained physically present and available for immediate diagnosis and treatment of emergencies.

## 2022-03-04 ENCOUNTER — TELEPHONE (OUTPATIENT)
Dept: ONCOLOGY | Facility: HOSPITAL | Age: 72
End: 2022-03-04

## 2022-03-04 ENCOUNTER — DOCUMENTATION (OUTPATIENT)
Dept: SURGERY | Facility: CLINIC | Age: 72
End: 2022-03-04

## 2022-03-04 LAB
CYTO UR: NORMAL
LAB AP CASE REPORT: NORMAL
LAB AP CLINICAL INFORMATION: NORMAL
PATH REPORT.FINAL DX SPEC: NORMAL
PATH REPORT.GROSS SPEC: NORMAL

## 2022-03-04 NOTE — TELEPHONE ENCOUNTER
Caller: Татьяна Jackson    Relationship: Self    Best call back number: [phone]    What form or medical record are you requesting: LETTER FOR CAREGIVER    Who is requesting this form or medical record from you: [facility].     If fax, what is the fax number: [phone] TO OSCAR OVALLES    Timeframe paperwork needed: ASAP--NEEDED TODAY     Additional notes: PATIENT IS HAVING A BILATERAL MASTECTOMY ON 3/8/21 AND WILL REQUIRE CARE FROM HER DAUGHTER.   DAUGHTER'S EMPLOYER IS REQUESTING A LETTER CONFIRMING SURGERY AND TIME FRAME THAT PATIENT WILL REQUIRE CARE.     PLEASE FAX LETTER TO SCHOOL

## 2022-03-07 ENCOUNTER — HOSPITAL ENCOUNTER (OUTPATIENT)
Dept: OCCUPATIONAL THERAPY | Facility: HOSPITAL | Age: 72
Discharge: HOME | End: 2022-03-07
Admitting: SURGERY

## 2022-03-07 DIAGNOSIS — Z01.818 ENCOUNTER FOR PREOPERATIVE ASSESSMENT: ICD-10-CM

## 2022-03-07 DIAGNOSIS — C50.919 MALIGNANT NEOPLASM OF FEMALE BREAST, UNSPECIFIED ESTROGEN RECEPTOR STATUS, UNSPECIFIED LATERALITY, UNSPECIFIED SITE OF BREAST: Primary | ICD-10-CM

## 2022-03-07 PROCEDURE — 97535 SELF CARE MNGMENT TRAINING: CPT

## 2022-03-07 PROCEDURE — 97110 THERAPEUTIC EXERCISES: CPT

## 2022-03-07 PROCEDURE — 93702 BIS XTRACELL FLUID ANALYSIS: CPT

## 2022-03-07 PROCEDURE — 97165 OT EVAL LOW COMPLEX 30 MIN: CPT

## 2022-03-07 NOTE — THERAPY EVALUATION
Outpatient Occupational Therapy Lymphedema Initial Evaluation   [place]     Patient Name: Татьяна Jackson  : 1950  MRN: [id number]  Today's Date: 3/7/2022      Visit Date: 2022    Patient Active Problem List   Diagnosis   • Ulcerative colitis (HCC)   • Primary hypertension   • Overweight (BMI 25.0-29.9)   • Arthralgia   • Right wrist pain   • Slow transit constipation   • Herpes zoster without complication   • Intraductal papillary adenocarcinoma of left breast   • Near syncope   • Hematochezia        Past Medical History:   Diagnosis Date   • Arthritis    • Breast cancer (HCC)     left breast, diagnosed with breast cancer 2 weeks ago, [facility]   • Ulcerative colitis (HCC)         Past Surgical History:   Procedure Laterality Date   • [place]ONOS[place]'t [place] name of hospital    • COLONOSCOPY N/A 3/3/2022    Procedure: COLONOSCOPY WITH BX, POLYPECTOMIES, HOT SNARE;  Surgeon: Margie Dotson MD;  Location: [facility] ENDOSCOPY;  Service: Gastroenterology;  Laterality: N/A;  COLON POLYPS, HEMORRHOIDS   • HERNIA REPAIR      UMBILICAL HERNIA REPAIR   • UPPER GASTROINTESTINAL ENDOSCOPY           Visit Dx:     ICD-10-CM ICD-9-CM   1. Malignant neoplasm of female breast, unspecified estrogen receptor status, unspecified laterality, unspecified site of breast (HCC)  C50.919 174.9   2. Encounter for preoperative assessment  Z01.818 V72.84        Patient History     Row Name 22 1600             History    Brief Description of Current Complaint Patient is a pleasant 71-year-old female who had a recent diagnosis of left papillary carcinoma ER/AL positive HER-2 negative who is scheduled for a mastectomy with sentinel node biopsy and possible axillary lymph node dissection on 3/8/2022  -CH      Hand Dominance right-handed  -CH              Fall Risk Assessment    Any falls in the past year: No  -CH      Does patient have a fear of falling No  -CH              Services     "Are you currently receiving Home Health services No  -CH      Do you plan to receive Home Health services in the near future No  -CH              Daily Activities    Primary Language English  -CH      Are you able to read Yes  -CH      Are you able to write Yes  -CH      How does patient learn best? Listening;Pictures/Video;Reading;Demonstration  -CH      Barriers to learning None  -CH              Safety    Are you being hurt, hit, or frightened by anyone at home or in your life? No  -CH      Are you being neglected by a caregiver No  -CH      Have you had any of the following issues with N/A  -CH            User Key  (r) = Recorded By, (t) = Taken By, (c) = Cosigned By    Initials Name Provider Type     Kayla Steiner OT Occupational Therapist                 Lymphedema     Row Name 03/07/22 2340             Subjective Pain    Able to rate subjective pain? yes  -CH      Pre-Treatment Pain Level 0  -CH      Post-Treatment Pain Level 0  -CH      Subjective Pain Comment Patient denied pain but did inform OT that she does have arthritis in her joints get stiff.  -CH              Subjective Comments    Subjective Comments Patient did states she is mildly anxious about her surgery.  -CH              Lymphedema Assessment    Lymphedema Classification LUE:;at risk/stage 0  -CH      Lymphedema Surgery Comments Patient is scheduled for bilateral mastectomy with axillary node biopsy possible dissection  -CH              LLIS - Physical Concerns    The amount of pain associated with my lymphedema is: 0  -CH      The amount of limb heaviness associated with my lymphedema is: 0  -CH      The amount of skin tightness associated with my lymphedema is: 0  -CH      The size of my swollen limb(s) seems: 0  -CH      Lymphedema affects the movement of my swollen limb(s): 0  -CH      The strength in my swollen limb(s) is: 0  -CH              LLIS - Psychosocial Concerns    Lymphedema affects my body image (i.e., \"how I think I " "look\"). 0  -CH      Lymphedema affects my socializing with others. 0  -CH      Lymphedema affects my intimate relations with spouse or partner (rate 0 if not applicable 0  -CH      Lymphedema \"gets me down\" (i.e., depression, frustration, or anger) 0  -CH      I must rely on others for help due to my lymphedema. 0  -CH      I know what to do to manage my lymphedema 4  -CH              LLIS - Functional Concerns    Lymphedema affects my ability to perform self-care activities (i.e. eating, dressing, hygiene) 0  -CH      Lymphedema affects my ability to perform routine home or work-related activities. 0  -CH      Lymphedema affects my performance of preferred leisure activities. 0  -CH      Lymphedema affects proper fit of clothing/shoes 0  -CH      Lymphedema affects my sleep 0  -CH              Posture/Observations    Posture- WNL Posture is WNL  -CH              General ROM    GENERAL ROM COMMENTS Bilateral upper extremities within functional limits  -CH              MMT (Manual Muscle Testing)    General MMT Comments Bilateral upper extremity within functional limits  -CH              L-Dex Bioimpedence Screening    L-Dex Measurement Extremity LUE  -CH      L-Dex Patient Position Standing  -CH      L-Dex UE Dominate Side Right  -CH      L-Dex UE At Risk Side Left  -CH      L-Dex UE Pre Surgical Value Yes  -CH      L-Dex UE Score 1.3  -CH      L-Dex UE Baseline Score 1.3  -CH      L-Dex UE Value Change 0  -CH      L-Dex UE Comment Baseline score taken this date.  -CH      $ L-Dex Charge yes  -CH              Lymphedema Life Impact Scale Totals    A.  Total Q1 - Q17 (Do not include Q18) 4  -CH      B.  Total number of questions answered (Q1-Q17) 17  -CH      C. Divide A by B 0.24  -CH      D. Multiple C by 25 6  -CH            User Key  (r) = Recorded By, (t) = Taken By, (c) = Cosigned By    Initials Name Provider Type    Kayla Núñez OT Occupational Therapist                  Therapy Education  Education " Details: Patient provided with education on basic lymphatic pathology and the development of lymphedema to help patient understand the benefits of the lymphedema prevention program.  Patient also provided education on activity guidelines and weightbearing restrictions after her surgery.  Patient also provided with an HE{ to help with mitigation of scar tissue development and recovery of range of motion after surgery  Given: Symptoms/condition management, HEP, Edema management  Program: New  How Provided: Verbal, Demonstration, Written  Provided to: Patient, Caregiver  Level of Understanding: Teach back education performed, Verbalized, Demonstrated  85778 - OT Self Care/Mgmt Minutes: 25     Patient provided education on orthopedic and lymph fluid dynamic changes from mastectomy and sentinel node removal surgery, post-surgical compression education and guidance, activity guidelines and weight bearing restrictions and education on a stretching HEP.  Patient educated on the benefit and use of the post-surgical camisole provided.  Patient pre-fitted for the compression camisole and estimated size is large possibly extra-large.  Patient educated on the benefit to a minimum of 2 post- surgical garment to have one to wash and one to wear to support hygiene and prevent infection.      OT Goals     Row Name 03/07/22 1720          Time Calculation    OT Goal Re-Cert Due Date 04/06/22  -           User Key  (r) = Recorded By, (t) = Taken By, (c) = Cosigned By    Initials Name Provider Type    Kayla Núñez OT Occupational Therapist              GOALS:   1. Post Breast Surgery Care/at risk for Lymphedema  LTG 1: 90 days:  As an indicator of no exacerbation of lymphedema staging, the patient will present with an L-Dex score less than [10] points from preoperative baseline.   STATUS: New  STG 1a:   30 days: To prevent exacerbation of mixed edema to lymphedema, patient will utilize the 2 postsurgical compression garments  daily.      STATUS: New  STG 1b: 30 days: Patient will be independent with self-manual lymphatic massage.    STATUS: New  STG 1c: 30 days:  Patient will be independent with identification of signs and symptoms of lymphedema exasperation per stoplight to recovery education handout.   STATUS: New  STG 1 d: 30 days: Patient will be independent with HEP to prevent advancement in lymphedema staging.   STATUS: New  TREATMENT:  Self Care/ADL retraining, Therapeutic Activity, Neuromuscular Re-education, Therapeutic Exercise, Bioimpedence Fluid Analysis, Post-Surgical compression garement [address] [facility]/ [place] Camisole Kit 2860K, Orthotic Management and training,  and Manual Therapy.     OT Assessment/Plan     Row Name 03/07/22 1718          OT Assessment    Functional Limitations Performance in self-care ADL  -CH     Impairments Impaired lymphatic circulation  -     Assessment Comments Patient is a pleasant 71-year-old female who is scheduled for bilateral mastectomy with sentinel node biopsy possible axillary node dissection who will benefit from continued skilled occupational therapy services to evaluate ongoing lymphatic functioning to prevent advancement in lymphedema staging.  Patient will also benefit from skilled OT to evaluate functional use of bilateral upper extremity status post surgery to ensure independence with I/ADLs.  -CH     OT Rehab Potential Good  -CH     Patient/caregiver participated in establishment of treatment plan and goals Yes  -CH     Patient would benefit from skilled therapy intervention Yes  -CH            OT Plan    OT Frequency Other (comment)  See duration  -CH     Predicted Duration of Therapy Intervention (OT) Pt. to re-evaluated 3 weeks post-surgery, 3 weeks post XRT, every 3 months from baseline for years 1-3 and every 6 months years 4 and 5  -CH     Planned CPT's? OT EVAL LOW COMPLEXITY: 13739;OT RE-EVAL: 66922;OT NEUROMUSC RE EDUCATION EA 15 MIN: 72796;OT THER PROC EA 15  MIN: 58255WI;OT THER ACT EA 15 MIN: 19590MT;OT SELF CARE/MGMT/TRAIN 15 MIN: 72966;OT HOT/COLD PACK;OT ULTRASOUND EA 15 MIN: 64103;OT ELECTRIC STIM ATTD EA 15 MIN: 90538;OT MANUAL THERAPY EA 15 MIN: 63302;OT BIS XTRACELL FLUID ANALYSIS: 79536;OT ORTHO/PROSTHET CHECKOUT EA 15 MIN: 64399;OT ORTHOTIC MGMT/TRAIN EA 15 MIN: 95450  -     Planned Therapy Interventions (Optional Details) patient/family education;home exercise program;orthotic fitting/training;prosthetic fitting/training;postural re-education;strengthening;stretching;ROM (Range of Motion);manual therapy techniques  -           User Key  (r) = Recorded By, (t) = Taken By, (c) = Cosigned By    Initials Name Provider Type     Kayla Steiner OT Occupational Therapist              OT eval complexity:   Examination:   Performance Deficits include: Bathing showering, dressing, community mobility, health management, home management, sleep   # deficits affecting performance: 5 or more  Decision Making:   Treatment Options Considered : Health promotion, restoration/remediation, adaptation, prevention  # Treatment options considered : Multiple (4+)  Modification Made for: None  Level of Task Modification: None  Comorbidity Affect Performance: None  How is performance affected: N/A  Evaluation Level Determined: Low              Time Calculation:   Timed Charges  36424 - OT Therapeutic Exercise Minutes: 10  36598 - OT Self Care/Mgmt Minutes: 25  Untimed Charges  OT Eval/Re-eval Minutes: 25  Total Minutes  Timed Charges Total Minutes: 35  Untimed Charges Total Minutes: 25   Total Minutes: 60     Therapy Charges for Today     Code Description Service Date Service Provider Modifiers Qty    89199915904 HC OT THER PROC EA 15 MIN 3/7/2022 Kayla Steiner OT GO 1    24473237124 HC OT SELF CARE/MGMT/TRAIN EA 15 MIN 3/7/2022 Kayla Steiner OT GO 2    38305514085 HC PT BIS XTRACELL FLUID ANALYSIS 3/7/2022 Kayla Steiner OT  1    99666857266 HC OT EVAL LOW COMPLEXITY 4  3/7/2022 Kayla Steiner, OT GO 1                    Kayla Steiner, OT  3/7/2022

## 2022-03-08 ENCOUNTER — HOSPITAL ENCOUNTER (OUTPATIENT)
Dept: NUCLEAR MEDICINE | Facility: HOSPITAL | Age: 72
Discharge: HOME | End: 2022-03-08

## 2022-03-08 ENCOUNTER — HOSPITAL ENCOUNTER (OUTPATIENT)
Facility: HOSPITAL | Age: 72
LOS: 1 days | Discharge: HOME | End: 2022-03-09
Attending: SURGERY | Admitting: SURGERY

## 2022-03-08 ENCOUNTER — ANESTHESIA EVENT (OUTPATIENT)
Dept: PERIOP | Facility: HOSPITAL | Age: 72
End: 2022-03-08

## 2022-03-08 ENCOUNTER — ANESTHESIA (OUTPATIENT)
Dept: PERIOP | Facility: HOSPITAL | Age: 72
End: 2022-03-08

## 2022-03-08 DIAGNOSIS — Z78.9 DECREASED ACTIVITIES OF DAILY LIVING (ADL): ICD-10-CM

## 2022-03-08 DIAGNOSIS — D05.12 INTRADUCTAL PAPILLARY ADENOCARCINOMA OF LEFT BREAST: ICD-10-CM

## 2022-03-08 DIAGNOSIS — Z90.13 STATUS POST BILATERAL MASTECTOMY: Primary | ICD-10-CM

## 2022-03-08 PROCEDURE — 25010000002 FENTANYL CITRATE (PF) 50 MCG/ML SOLUTION: Performed by: NURSE ANESTHETIST, CERTIFIED REGISTERED

## 2022-03-08 PROCEDURE — 38792 RA TRACER ID OF SENTINL NODE: CPT

## 2022-03-08 PROCEDURE — 88342 IMHCHEM/IMCYTCHM 1ST ANTB: CPT | Performed by: SURGERY

## 2022-03-08 PROCEDURE — 38525 BIOPSY/REMOVAL LYMPH NODES: CPT | Performed by: SURGERY

## 2022-03-08 PROCEDURE — 25010000002 HYDROMORPHONE PER 4 MG: Performed by: NURSE ANESTHETIST, CERTIFIED REGISTERED

## 2022-03-08 PROCEDURE — 25010000002 MIDAZOLAM PER 1 MG: Performed by: STUDENT IN AN ORGANIZED HEALTH CARE EDUCATION/TRAINING PROGRAM

## 2022-03-08 PROCEDURE — 25010000002 PROPOFOL 10 MG/ML EMULSION: Performed by: NURSE ANESTHETIST, CERTIFIED REGISTERED

## 2022-03-08 PROCEDURE — 19303 MAST SIMPLE COMPLETE: CPT | Performed by: SURGERY

## 2022-03-08 PROCEDURE — 25010000002 GENTAMICIN PER 80 MG: Performed by: SURGERY

## 2022-03-08 PROCEDURE — 19303 MAST SIMPLE COMPLETE: CPT | Performed by: SPECIALIST/TECHNOLOGIST, OTHER

## 2022-03-08 PROCEDURE — A9520 TC99 TILMANOCEPT DIAG 0.5MCI: HCPCS | Performed by: SURGERY

## 2022-03-08 PROCEDURE — 0 TECHETIUM TC99M TILMANOCEPT: Performed by: SURGERY

## 2022-03-08 PROCEDURE — 88307 TISSUE EXAM BY PATHOLOGIST: CPT | Performed by: SURGERY

## 2022-03-08 PROCEDURE — 88332 PATH CONSLTJ SURG EA ADD BLK: CPT | Performed by: PATHOLOGY

## 2022-03-08 PROCEDURE — 25010000002 DEXAMETHASONE PER 1 MG: Performed by: NURSE ANESTHETIST, CERTIFIED REGISTERED

## 2022-03-08 PROCEDURE — C1889 IMPLANT/INSERT DEVICE, NOC: HCPCS | Performed by: SURGERY

## 2022-03-08 PROCEDURE — 25010000002 HYDROMORPHONE 1 MG/ML SOLUTION: Performed by: NURSE ANESTHETIST, CERTIFIED REGISTERED

## 2022-03-08 PROCEDURE — 88331 PATH CONSLTJ SURG 1 BLK 1SPC: CPT | Performed by: PATHOLOGY

## 2022-03-08 PROCEDURE — 25010000002 ONDANSETRON PER 1 MG: Performed by: SURGERY

## 2022-03-08 PROCEDURE — 25010000002 MORPHINE PER 10 MG: Performed by: SURGERY

## 2022-03-08 DEVICE — LIGACLIP MCA MULTIPLE CLIP APPLIERS, 20 MEDIUM CLIPS
Type: IMPLANTABLE DEVICE | Site: BREAST | Status: FUNCTIONAL
Brand: LIGACLIP

## 2022-03-08 RX ORDER — HYDROMORPHONE HCL 110MG/55ML
PATIENT CONTROLLED ANALGESIA SYRINGE INTRAVENOUS AS NEEDED
Status: DISCONTINUED | OUTPATIENT
Start: 2022-03-08 | End: 2022-03-08 | Stop reason: SURG

## 2022-03-08 RX ORDER — CLINDAMYCIN PHOSPHATE 900 MG/50ML
900 INJECTION INTRAVENOUS ONCE
Status: COMPLETED | OUTPATIENT
Start: 2022-03-08 | End: 2022-03-08

## 2022-03-08 RX ORDER — MEPERIDINE HYDROCHLORIDE 25 MG/ML
12.5 INJECTION INTRAMUSCULAR; INTRAVENOUS; SUBCUTANEOUS
Status: DISCONTINUED | OUTPATIENT
Start: 2022-03-08 | End: 2022-03-08 | Stop reason: HOSPADM

## 2022-03-08 RX ORDER — SODIUM CHLORIDE, SODIUM LACTATE, POTASSIUM CHLORIDE, CALCIUM CHLORIDE 600; 310; 30; 20 MG/100ML; MG/100ML; MG/100ML; MG/100ML
9 INJECTION, SOLUTION INTRAVENOUS CONTINUOUS PRN
Status: DISCONTINUED | OUTPATIENT
Start: 2022-03-08 | End: 2022-03-08 | Stop reason: HOSPADM

## 2022-03-08 RX ORDER — LIDOCAINE HYDROCHLORIDE 20 MG/ML
INJECTION, SOLUTION INFILTRATION; PERINEURAL AS NEEDED
Status: DISCONTINUED | OUTPATIENT
Start: 2022-03-08 | End: 2022-03-08 | Stop reason: SURG

## 2022-03-08 RX ORDER — PANTOPRAZOLE SODIUM 40 MG/1
40 TABLET, DELAYED RELEASE ORAL
Status: DISCONTINUED | OUTPATIENT
Start: 2022-03-09 | End: 2022-03-09 | Stop reason: HOSPADM

## 2022-03-08 RX ORDER — GENTAMICIN SULFATE 40 MG/ML
INJECTION, SOLUTION INTRAMUSCULAR; INTRAVENOUS AS NEEDED
Status: DISCONTINUED | OUTPATIENT
Start: 2022-03-08 | End: 2022-03-09 | Stop reason: HOSPADM

## 2022-03-08 RX ORDER — MAGNESIUM HYDROXIDE 1200 MG/15ML
LIQUID ORAL AS NEEDED
Status: DISCONTINUED | OUTPATIENT
Start: 2022-03-08 | End: 2022-03-09 | Stop reason: HOSPADM

## 2022-03-08 RX ORDER — OXYCODONE HYDROCHLORIDE 5 MG/1
5 TABLET ORAL
Status: DISCONTINUED | OUTPATIENT
Start: 2022-03-08 | End: 2022-03-08 | Stop reason: HOSPADM

## 2022-03-08 RX ORDER — CLINDAMYCIN PHOSPHATE 600 MG/50ML
600 INJECTION INTRAVENOUS EVERY 8 HOURS
Status: COMPLETED | OUTPATIENT
Start: 2022-03-08 | End: 2022-03-09

## 2022-03-08 RX ORDER — PROPOFOL 10 MG/ML
VIAL (ML) INTRAVENOUS AS NEEDED
Status: DISCONTINUED | OUTPATIENT
Start: 2022-03-08 | End: 2022-03-08 | Stop reason: SURG

## 2022-03-08 RX ORDER — DEXMEDETOMIDINE HYDROCHLORIDE 100 UG/ML
INJECTION, SOLUTION INTRAVENOUS AS NEEDED
Status: DISCONTINUED | OUTPATIENT
Start: 2022-03-08 | End: 2022-03-08 | Stop reason: SURG

## 2022-03-08 RX ORDER — NALOXONE HCL 0.4 MG/ML
0.4 VIAL (ML) INJECTION
Status: DISCONTINUED | OUTPATIENT
Start: 2022-03-08 | End: 2022-03-09 | Stop reason: HOSPADM

## 2022-03-08 RX ORDER — ONDANSETRON 2 MG/ML
4 INJECTION INTRAMUSCULAR; INTRAVENOUS EVERY 6 HOURS PRN
Status: DISCONTINUED | OUTPATIENT
Start: 2022-03-08 | End: 2022-03-09 | Stop reason: HOSPADM

## 2022-03-08 RX ORDER — PROMETHAZINE HYDROCHLORIDE 25 MG/1
25 SUPPOSITORY RECTAL ONCE AS NEEDED
Status: DISCONTINUED | OUTPATIENT
Start: 2022-03-08 | End: 2022-03-08 | Stop reason: HOSPADM

## 2022-03-08 RX ORDER — DEXTROSE AND SODIUM CHLORIDE 5; .45 G/100ML; G/100ML
100 INJECTION, SOLUTION INTRAVENOUS CONTINUOUS
Status: DISCONTINUED | OUTPATIENT
Start: 2022-03-08 | End: 2022-03-09 | Stop reason: HOSPADM

## 2022-03-08 RX ORDER — ONDANSETRON 2 MG/ML
4 INJECTION INTRAMUSCULAR; INTRAVENOUS ONCE AS NEEDED
Status: DISCONTINUED | OUTPATIENT
Start: 2022-03-08 | End: 2022-03-08 | Stop reason: HOSPADM

## 2022-03-08 RX ORDER — PROMETHAZINE HYDROCHLORIDE 12.5 MG/1
25 TABLET ORAL ONCE AS NEEDED
Status: DISCONTINUED | OUTPATIENT
Start: 2022-03-08 | End: 2022-03-08 | Stop reason: HOSPADM

## 2022-03-08 RX ORDER — FENTANYL CITRATE 50 UG/ML
INJECTION, SOLUTION INTRAMUSCULAR; INTRAVENOUS AS NEEDED
Status: DISCONTINUED | OUTPATIENT
Start: 2022-03-08 | End: 2022-03-08 | Stop reason: SURG

## 2022-03-08 RX ORDER — ACETAMINOPHEN 500 MG
1000 TABLET ORAL ONCE
Status: COMPLETED | OUTPATIENT
Start: 2022-03-08 | End: 2022-03-08

## 2022-03-08 RX ORDER — ONDANSETRON 4 MG/1
4 TABLET, FILM COATED ORAL EVERY 6 HOURS PRN
Status: DISCONTINUED | OUTPATIENT
Start: 2022-03-08 | End: 2022-03-09 | Stop reason: HOSPADM

## 2022-03-08 RX ORDER — HYDROCODONE BITARTRATE AND ACETAMINOPHEN 5; 325 MG/1; MG/1
2 TABLET ORAL EVERY 4 HOURS PRN
Status: DISCONTINUED | OUTPATIENT
Start: 2022-03-08 | End: 2022-03-09 | Stop reason: HOSPADM

## 2022-03-08 RX ORDER — EPHEDRINE SULFATE 50 MG/ML
INJECTION, SOLUTION INTRAVENOUS AS NEEDED
Status: DISCONTINUED | OUTPATIENT
Start: 2022-03-08 | End: 2022-03-08 | Stop reason: SURG

## 2022-03-08 RX ORDER — MIDAZOLAM HYDROCHLORIDE 1 MG/ML
1 INJECTION INTRAMUSCULAR; INTRAVENOUS ONCE
Status: COMPLETED | OUTPATIENT
Start: 2022-03-08 | End: 2022-03-08

## 2022-03-08 RX ORDER — DEXAMETHASONE SODIUM PHOSPHATE 4 MG/ML
INJECTION, SOLUTION INTRA-ARTICULAR; INTRALESIONAL; INTRAMUSCULAR; INTRAVENOUS; SOFT TISSUE AS NEEDED
Status: DISCONTINUED | OUTPATIENT
Start: 2022-03-08 | End: 2022-03-08 | Stop reason: SURG

## 2022-03-08 RX ORDER — MORPHINE SULFATE 2 MG/ML
2 INJECTION, SOLUTION INTRAMUSCULAR; INTRAVENOUS
Status: DISCONTINUED | OUTPATIENT
Start: 2022-03-08 | End: 2022-03-09 | Stop reason: HOSPADM

## 2022-03-08 RX ADMIN — EPHEDRINE SULFATE 15 MG: 50 INJECTION INTRAVENOUS at 10:05

## 2022-03-08 RX ADMIN — FENTANYL CITRATE 100 MCG: 50 INJECTION, SOLUTION INTRAMUSCULAR; INTRAVENOUS at 10:02

## 2022-03-08 RX ADMIN — HYDROMORPHONE HYDROCHLORIDE 1 MG: 2 INJECTION, SOLUTION INTRAMUSCULAR; INTRAVENOUS; SUBCUTANEOUS at 10:24

## 2022-03-08 RX ADMIN — DEXAMETHASONE SODIUM PHOSPHATE 4 MG: 4 INJECTION INTRA-ARTICULAR; INTRALESIONAL; INTRAMUSCULAR; INTRAVENOUS; SOFT TISSUE at 10:07

## 2022-03-08 RX ADMIN — MORPHINE SULFATE 2 MG: 2 INJECTION, SOLUTION INTRAMUSCULAR; INTRAVENOUS at 18:55

## 2022-03-08 RX ADMIN — TILMANOCEPT 1 DOSE: KIT at 08:52

## 2022-03-08 RX ADMIN — HYDROMORPHONE HYDROCHLORIDE 0.5 MG: 1 INJECTION, SOLUTION INTRAMUSCULAR; INTRAVENOUS; SUBCUTANEOUS at 12:37

## 2022-03-08 RX ADMIN — ACETAMINOPHEN 1000 MG: 500 TABLET ORAL at 09:14

## 2022-03-08 RX ADMIN — CLINDAMYCIN IN 5 PERCENT DEXTROSE 900 MG: 18 INJECTION, SOLUTION INTRAVENOUS at 09:55

## 2022-03-08 RX ADMIN — CLINDAMYCIN IN 5 PERCENT DEXTROSE 600 MG: 12 INJECTION, SOLUTION INTRAVENOUS at 17:05

## 2022-03-08 RX ADMIN — OXYCODONE HYDROCHLORIDE 5 MG: 5 TABLET ORAL at 12:51

## 2022-03-08 RX ADMIN — HYDROMORPHONE HYDROCHLORIDE 0.5 MG: 1 INJECTION, SOLUTION INTRAMUSCULAR; INTRAVENOUS; SUBCUTANEOUS at 12:50

## 2022-03-08 RX ADMIN — MORPHINE SULFATE 2 MG: 2 INJECTION, SOLUTION INTRAMUSCULAR; INTRAVENOUS at 16:14

## 2022-03-08 RX ADMIN — MORPHINE SULFATE 2 MG: 2 INJECTION, SOLUTION INTRAMUSCULAR; INTRAVENOUS at 20:59

## 2022-03-08 RX ADMIN — SODIUM CHLORIDE, POTASSIUM CHLORIDE, SODIUM LACTATE AND CALCIUM CHLORIDE 9 ML/HR: 600; 310; 30; 20 INJECTION, SOLUTION INTRAVENOUS at 09:14

## 2022-03-08 RX ADMIN — LIDOCAINE HYDROCHLORIDE 50 MG: 20 INJECTION, SOLUTION INFILTRATION; PERINEURAL at 10:02

## 2022-03-08 RX ADMIN — PROPOFOL 200 MG: 10 INJECTION, EMULSION INTRAVENOUS at 10:02

## 2022-03-08 RX ADMIN — DEXMEDETOMIDINE HYDROCHLORIDE 10 MCG: 100 INJECTION, SOLUTION, CONCENTRATE INTRAVENOUS at 10:02

## 2022-03-08 RX ADMIN — MIDAZOLAM HYDROCHLORIDE 1 MG: 1 INJECTION, SOLUTION INTRAMUSCULAR; INTRAVENOUS at 09:46

## 2022-03-08 RX ADMIN — DEXMEDETOMIDINE HYDROCHLORIDE 10 MCG: 100 INJECTION, SOLUTION, CONCENTRATE INTRAVENOUS at 09:51

## 2022-03-08 RX ADMIN — ONDANSETRON 4 MG: 2 INJECTION INTRAMUSCULAR; INTRAVENOUS at 21:05

## 2022-03-08 RX ADMIN — DEXTROSE AND SODIUM CHLORIDE 100 ML/HR: 5; 450 INJECTION, SOLUTION INTRAVENOUS at 16:16

## 2022-03-08 RX ADMIN — DEXMEDETOMIDINE HYDROCHLORIDE 20 MCG: 100 INJECTION, SOLUTION, CONCENTRATE INTRAVENOUS at 09:57

## 2022-03-08 NOTE — ANESTHESIA POSTPROCEDURE EVALUATION
Patient: Татьяна Jackson    Procedure Summary     Date: 03/08/22 Room / Location: [facility] OR  / [facility] OR [facility]    Anesthesia Start: 0951 Anesthesia Stop: 1201    Procedure: BREAST MASTECTOMY WITH SENTINEL NODE BIOPSY (Bilateral Breast) Diagnosis:       Intraductal papillary adenocarcinoma of left breast      (Intraductal papillary adenocarcinoma of left breast [D05.12])    Surgeons: Deborah Guillaume MD Provider: Raya Garcia DO    Anesthesia Type: general ASA Status: 3          Anesthesia Type: general    Vitals  Vitals Value Taken Time   /89 03/08/22 1324   Temp 36.1 °C (97 °F) 03/08/22 1202   Pulse 78 03/08/22 1326   Resp 17 03/08/22 1315   SpO2 95 % 03/08/22 1326   Vitals shown include unvalidated device data.        Post Anesthesia Care and Evaluation    Patient location during evaluation: bedside  Patient participation: complete - patient participated  Level of consciousness: awake  Pain management: adequate  Airway patency: patent  Anesthetic complications: No anesthetic complications  PONV Status: none  Cardiovascular status: acceptable and stable  Respiratory status: acceptable  Hydration status: acceptable    Comments: An Anesthesiologist personally participated in the most demanding procedures (including induction and emergence if applicable) in the anesthesia plan, monitored the course of anesthesia administration at frequent intervals and remained physically present and available for immediate diagnosis and treatment of emergencies.

## 2022-03-08 NOTE — OP NOTE
BREAST MASTECTOMY WITH SENTINEL NODE BIOPSY  Procedure Report    Patient Name:  Татьяна Jackson  YOB: 1950    Date of Surgery:  3/8/2022     Indications:  Breast cancer    Pre-op Diagnosis:   Intraductal papillary adenocarcinoma of left breast [D05.12]       Post-Op Diagnosis Codes:     * Intraductal papillary adenocarcinoma of left breast [D05.12]    Procedure/CPT® Codes:      Procedure(s):  BILATERAL BREAST MASTECTOMY WITH LEFT SENTINEL NODE BIOPSY    Staff:  Surgeon(s):  Deborah Guillaume MD    Assistant: Elsa Diggs CSA    Anesthesia: General    Estimated Blood Loss: 100ml    Implants:    Implant Name Type Inv. Item Serial No.  Lot No. LRB No. Used Action   CLIPAPPLR M/ ENDO LIGACLIP 20CLP 11IN MD - [id number] Implant CLIPAPPLR M/ ENDO LIGACLIP 20CLP 11IN MD  [employer]  [employer] [id number] Left 1 Implanted   CLIPAPPLR M/ ENDO LIGACLIP 20CLP 11IN MD - [id number] Implant CLIPAPPLR M/ ENDO LIGACLIP 20CLP 11IN MD  [employer]  [employer] J [id number] Right 1 Implanted       Specimen:          Specimens     ID Source Type Tests Collected By Collected At Frozen?    A Breast, Left Tissue · TISSUE PATHOLOGY EXAM   Deborah Guillaume MD 3/8/22 0901     Description: left breast, short stitch is superior, long stitch is lateral     Comment: Fresh for permanent.     B Breast, Right Tissue · TISSUE PATHOLOGY EXAM   Deborah Guillaume MD 3/8/22 0901     Description: right breast, short stitch is superior, long stitch is lateral     Comment: Fresh for permanent.    C [place] Lymph Node Tissue · TISSUE PATHOLOGY EXAM   Deborah Guillaume MD 3/8/22 0902 Yes    Description: left sentinel lymph node with count of 700    Comment: Fresh for frozen    D [place] Lymph Node Tissue · TISSUE PATHOLOGY EXAM   Deborah Guillaume MD 3/8/22 0903 Yes    Description: left sentinel lymph node #2 with count of 10    Comment: Fresh for frozen              Findings:  None    Complications: None    Description of Procedure:   The morning of the procedure she was taken down to radiology where she was injected for a sentinel node. She was then brought back to the OR and placed supine on the table. We then began with the right breast portion of the procedure. An elliptical incision was made around the nipple-areolar complex and a mastectomy was completed by removing the breast by raising skin flaps superiorly to the clavicle, medially to the sternum, out laterally to the muscle border, and inferiorly to the inframammary crease. The breast was taken off the chest wall, including the prepectoral fascia, and it was marked short stitch superior and long stitch lateral and sent down to Pathology. The wound was copiously irrigated with sterile water and bacitracin. Two DUANE drains were placed and the wound was carefully inspected. Electrocautery and clips were used to achieve hemostasis. The deeper layers were closed with 2-0 Vicryl and the skin was closed with staples.  We then moved to the left breast portion of the procedure. An elliptical incision was made around the nipple-areolar complex and a mastectomy was completed by removing the breast by raising skin flaps superiorly to the clavicle, medially to the sternum, out laterally to the muscle border, and inferiorly to the inframammary crease. The breast was taken off the chest wall, including the prepectoral fascia, and it was marked short stitch superior and long stitch lateral and sent down to Pathology. The wound was copiously irrigated with sterile water and bacitracin.    We were able to identify the nodes using a probe through the mastectomy incision. We dissected down to the level of the clavipectoral fascia. The fascia was opened and incised and the nodes were identified and removed. The first had a count of 700 and the second had a count of 10. The remainder of the bed was less than 10% of the highest number. The wound was  copiously irrigated. Clips and electrocautery were used to achieve hemostasis. Two DUANE drains were placed in the mastectomy bed and the wound was carefully inspected. The deeper layers were closed with 2-0 Vicryl and the skin was closed with staples. The patient tolerated the procedure well.  Assistant: Elsa Diggs CSA  was responsible for performing the following activities: Retraction and Suturing and their skilled assistance was necessary for the success of this case.    Deborah Guillaume MD     Date: 3/8/2022  Time: 11:31 EST

## 2022-03-08 NOTE — PLAN OF CARE
Goal Outcome Evaluation:      Pt pain controlled with morphine. Jpx4 with serosanguinous drainage. Dressings are CDI. VSS. Family at bedside. Will continue to monitor. Damaris Lucero RN

## 2022-03-08 NOTE — ANESTHESIA PREPROCEDURE EVALUATION
Anesthesia Evaluation     Patient summary reviewed and Nursing notes reviewed   no history of anesthetic complications:  NPO Solid Status: > 8 hours  NPO Liquid Status: > 2 hours           Airway   Mallampati: II  TM distance: >3 FB  No difficulty expected  Dental          Pulmonary - negative pulmonary ROS and normal exam   Cardiovascular - normal exam  Exercise tolerance: good (4-7 METS)    (+) hypertension,       Neuro/Psych- negative ROS  GI/Hepatic/Renal/Endo    (+)  GI bleeding lower ,     ROS Comment: Ulcerative colitis    Musculoskeletal     Abdominal  - normal exam   Substance History - negative use     OB/GYN negative ob/gyn ROS         Other   arthritis,    history of cancer (breast)                    Anesthesia Plan    ASA 3     general   (Patient understands anesthesia not responsible for dental damage.)  intravenous induction     Anesthetic plan, all risks, benefits, and alternatives have been provided, discussed and informed consent has been obtained with: patient.    Plan discussed with CRNA.        CODE STATUS:

## 2022-03-09 VITALS
BODY MASS INDEX: 26.78 KG/M2 | DIASTOLIC BLOOD PRESSURE: 65 MMHG | TEMPERATURE: 97.88 F | OXYGEN SATURATION: 100 % | RESPIRATION RATE: 18 BRPM | HEIGHT: 67 IN | WEIGHT: 170.64 LBS | HEART RATE: 81 BPM | SYSTOLIC BLOOD PRESSURE: 156 MMHG

## 2022-03-09 PROBLEM — Z90.13 STATUS POST BILATERAL MASTECTOMY: Status: ACTIVE | Noted: 2022-03-09

## 2022-03-09 PROCEDURE — A9270 NON-COVERED ITEM OR SERVICE: HCPCS | Performed by: SURGERY

## 2022-03-09 PROCEDURE — 63710000001 PANTOPRAZOLE 40 MG TABLET DELAYED-RELEASE: Performed by: SURGERY

## 2022-03-09 PROCEDURE — 97535 SELF CARE MNGMENT TRAINING: CPT

## 2022-03-09 PROCEDURE — L8015 EXT BREASTPROSTHESIS GARMENT: HCPCS

## 2022-03-09 PROCEDURE — 99024 POSTOP FOLLOW-UP VISIT: CPT | Performed by: NURSE PRACTITIONER

## 2022-03-09 PROCEDURE — 97165 OT EVAL LOW COMPLEX 30 MIN: CPT

## 2022-03-09 PROCEDURE — 97760 ORTHOTIC MGMT&TRAING 1ST ENC: CPT

## 2022-03-09 RX ORDER — HYDROCODONE BITARTRATE AND ACETAMINOPHEN 5; 325 MG/1; MG/1
2 TABLET ORAL EVERY 4 HOURS PRN
Qty: 18 TABLET | Refills: 0 | Status: SHIPPED | OUTPATIENT
Start: 2022-03-09 | End: 2022-03-15

## 2022-03-09 RX ORDER — CEPHALEXIN 500 MG/1
500 CAPSULE ORAL 4 TIMES DAILY
Qty: 20 CAPSULE | Refills: 0 | Status: SHIPPED | OUTPATIENT
Start: 2022-03-09 | End: 2022-03-14

## 2022-03-09 RX ADMIN — DEXTROSE AND SODIUM CHLORIDE 100 ML/HR: 5; 450 INJECTION, SOLUTION INTRAVENOUS at 01:24

## 2022-03-09 RX ADMIN — PANTOPRAZOLE SODIUM 40 MG: 40 TABLET, DELAYED RELEASE ORAL at 05:54

## 2022-03-09 RX ADMIN — CLINDAMYCIN IN 5 PERCENT DEXTROSE 600 MG: 12 INJECTION, SOLUTION INTRAVENOUS at 02:51

## 2022-03-09 NOTE — THERAPY EVALUATION
Patient Name: Татьяна Jackson  : 1950    MRN: [id number]                              Today's Date: 3/9/2022       Admit Date: 3/8/2022    Visit Dx:     ICD-10-CM ICD-9-CM   1. Status post bilateral mastectomy with left sentinel node biopsy  Z90.13 V45.71   2. Intraductal papillary adenocarcinoma of left breast  D05.12 233.0   3. Decreased activities of daily living (ADL)  Z78.9 V49.89     Patient Active Problem List   Diagnosis   • Ulcerative colitis (HCC)   • Primary hypertension   • Overweight (BMI 25.0-29.9)   • Arthralgia   • Right wrist pain   • Slow transit constipation   • Herpes zoster without complication   • Intraductal papillary adenocarcinoma of left breast   • Near syncope   • Hematochezia   • Status post bilateral mastectomy with left sentinel node biopsy     Past Medical History:   Diagnosis Date   • Arthritis    • Breast cancer (HCC)     left breast, diagnosed with breast cancer 2 weeks ago, [facility]   • Status post bilateral mastectomy with left sentinel node biopsy 3/9/2022   • Ulcerative colitis (HCC)      Past Surgical History:   Procedure Laterality Date   • [place]ONOSCOPY      [place]'t know name of hospital    • COLONOSCOPY N/A 3/3/2022    Procedure: COLONOSCOPY WITH BX, POLYPECTOMIES, HOT SNARE;  Surgeon: Margie Dotsno MD;  Location: [facility] ENDOSCOPY;  Service: Gastroenterology;  Laterality: N/A;  COLON POLYPS, HEMORRHOIDS   • HERNIA REPAIR      UMBILICAL HERNIA REPAIR   • MASTECTOMY W/ SENTINEL NODE BIOPSY Bilateral 3/8/2022    Procedure: BREAST MASTECTOMY WITH SENTINEL NODE BIOPSY;  Surgeon: Deborah Guillaume MD;  Location: [facility] OR [facility];  Service: General;  Laterality: Bilateral;   • UPPER GASTROINTESTINAL ENDOSCOPY        General Information     Row Name 22 1000          OT Time and Intention    Document Type evaluation  -AC     Mode of Treatment individual therapy;occupational therapy  -AC     Row Name 22 1000          General  Information    Patient Profile Reviewed yes  -AC     Prior Level of Function independent:  patient reports (I) with adls and functional mobility prior to hospitalization. no dme.  -AC     Existing Precautions/Restrictions --  radha drains x4, 5# lifting restriction, compression camisole at all times except for bathing.  -AC     Barriers to Rehab none identified  -AC     Row Name 03/09/22 1000          Occupational Profile    Reason for Services/Referral (Occupational Profile) Pt. is a 71 year old female admitted for the above diagnosis status post bilateral mastectomy on 3/8/2022.  OT consulted for fitting of postsurgical compression camisole/ADL assessment.  No previous OT services for current condition.  -AC     Row Name 03/09/22 1000          Living Environment    People in Home child(sanjeev), adult  -AC     Row Name 03/09/22 1000          Cognition    Orientation Status (Cognition) oriented x 4  -     Row Name 03/09/22 1000          Safety Issues, Functional Mobility    Safety Issues Affecting Function (Mobility) --  none identified  -AC     Impairments Affecting Function (Mobility) --  none identified  -           User Key  (r) = Recorded By, (t) = Taken By, (c) = Cosigned By    Initials Name Provider Type    AC Naomi Mott, OT Occupational Therapist                 Mobility/ADL's     Row Name 03/09/22 1437          Bed Mobility    Bed Mobility supine-sit-supine  -AC     Supine-Sit-Supine [place] (Bed Mobility) standby assist  -     Bed Mobility, Safety Issues decreased use of arms for pushing/pulling  -     Assistive Device (Bed Mobility) head of bed elevated  -AC     Comment, (Bed Mobility) pt. instructed to use built up pillows at home in order to prevent difficulty with bed mobility. also instructed to use log roll technique if needed.  -     Row Name 03/09/22 1437          Transfers    Transfers sit-stand transfer  -     Sit-Stand [place] (Transfers) independent  -     Row Name 03/09/22  1437          Functional Mobility    Functional Mobility- Ind. Level independent  -AC     Functional Mobility- Comment patient took multiple steps throughout the room during evaluation without AD and without loss of balance  -     Row Name 03/09/22 1437          Activities of Daily Living    BADL Assessment/Intervention --  patient is mostly (I) with basic alds, pt. instructed in donning/doffing camisole and adaptive technique for bathing/grooming.  pt. required assist for socks.  -           User Key  (r) = Recorded By, (t) = Taken By, (c) = Cosigned By    Initials Name Provider Type    Naomi Sanchez OT Occupational Therapist               Obj/Interventions     Row Name 03/09/22 1444          Sensory Assessment (Somatosensory)    Sensory Assessment (Somatosensory) sensation intact  -     Row Name 03/09/22 1444          Vision Assessment/Intervention    Visual Impairment/Limitations WNL  -[facility] Name 03/09/22 1444          Range of Motion Comprehensive    General Range of Motion bilateral upper extremity ROM WFL  -     Comment, General Range of Motion within surgical limits  -     Row Name 03/09/22 1444          Strength Comprehensive (MMT)    Comment, General Manual Muscle Testing (MMT) Assessment NT due to surgery  -     Row Name 03/09/22 1444          Motor Skills    Motor Skills coordination;functional endurance  -AC     Coordination WFL  -     Functional Endurance fair/fair plus  -     Row Name 03/09/22 1444          Balance    Balance Assessment standing dynamic balance  -AC     Dynamic Standing Balance independent  -           User Key  (r) = Recorded By, (t) = Taken By, (c) = Cosigned By    Initials Name Provider Type    Naomi Sanchez OT Occupational Therapist            Orthotic Management/Training:  - Location: trunk  - Type: Amoena post surgical garment  - Type Modifier: off the shelf  - Functional Improvement: reduce surgical edema (allows for increased ROM), reduce scar  tissue and support sensation normalization  - Fitting/Training Provided: sizing of orthotic, instructions in use, modification of orthotic (size of fiber filled form for symmetry)    Orthotic Fabrication/Fit:  - Location:bilateral  - Kind of Orthotic Needed: postsurgical compression camisole with drain management  - Reason for prosthetic: reduce surgical edema (allows for increased ROM), reduce scar tissue and support sensation normalization  - Type of Orthotic Provided: post surgical compression camisole   - Brand Dispensed:[employer]  - Model # Dispensed: Malu 2860  - Size Dispensed: Medium A/B  - Reason for Orthotic: lymphedema management/prevention  - Date to Initiate Orthotic Use: 3/9/2022  - Wearing Schedule: continuously except for bathing  - Tolerance: tolerates well  - Number of garments dispensed: 1     Goals/Plan    Limitation in Self-Care Goal 1 (OT)  Activity/Device: To prevent exacerbation of post-surgical edema, patient will utilize the Geogoerna camisole kit 2860 with poly-fiber breast form daily.  [place]/Cues Needed: (I)  Time Frame: long term goal (LTG); 10 days  Treatment: Fitting and training of Iselana Quynh style 2860 post-surgical camisole size M a/b, orthotic management/training, ADL retraining, and patient education                Clinical Impression     Row Name 03/09/22 3662          Plan of Care Review    Plan of Care Reviewed With patient;daughter;family  -AC     Progress no change  -AC     Outcome Evaluation Patient was fitted for a Rosa M Beauchamph postsurgical compression camisole style 2860 size medium A/B with bilateral breast forms.  Patient instructed to wear fiber filled forms when DUANE drains removed.  Patient was educated and trained on donning/doffing of camisole, wear schedule and postsurgical precautions.  Patient is pending hospital discharge today.  She was provided with educational handouts and follow-up contact number for Kayla Marroquin, breast cancer rehab  specialist/certified lymphedema therapist at [facility] outpatient clinic for additional camisoles and treatment.  -AC     Row Name 03/09/22 1445          Therapy Assessment/Plan (OT)    Patient/Family Therapy Goal Statement (OT) discharge today.  -AC     Rehab Potential (OT) good, to achieve stated therapy goals  -AC     Criteria for Skilled Therapeutic Interventions Met (OT) yes;meets criteria;skilled treatment is necessary  -AC     Therapy Frequency (OT) evaluation only  plus 1 treatment  -AC     Row Name 03/09/22 1445          Therapy Plan Review/Discharge Plan (OT)    Anticipated Discharge Disposition (OT) home with assist;home with outpatient therapy services  lymphemdema therapy  -AC     Row Name 03/09/22 1445          Positioning and Restraints    Pre-Treatment Position in bed  -AC     Post Treatment Position other  pt. left standing in room with family.  -AC           User Key  (r) = Recorded By, (t) = Taken By, (c) = Cosigned By    Initials Name Provider Type    AC Naomi Mott, RUFINA Occupational Therapist               Outcome Measures     Row Name 03/09/22 1449          How much help from another is currently needed...    Putting on and taking off regular lower body clothing? 3  -AC     Bathing (including washing, rinsing, and drying) 4  -AC     Toileting (which includes using toilet bed pan or urinal) 4  -AC     Putting on and taking off regular upper body clothing 3  -AC     Taking care of personal grooming (such as brushing teeth) 4  -AC     Eating meals 4  -AC     AM-PAC 6 Clicks Score (OT) 22  -AC     Row Name 03/09/22 1449          Functional Assessment    Outcome Measure Options AM-PAC 6 Clicks Daily Activity (OT);Optimal Instrument  -AC     Row Name 03/09/22 1449          Optimal Instrument    Optimal Instrument Optimal - 3  -AC     Bending/Stooping 2  -AC     Standing 1  -AC     Reaching 2  -AC     From the list, choose the 3 activities you would most like to be able to do without any  difficulty Bending/stooping;Standing;Reaching  -     Total Score Optimal - 3 5  -           User Key  (r) = Recorded By, (t) = Taken By, (c) = Cosigned By    Initials Name Provider Type     Naomi Mott OT Occupational Therapist                Occupational Therapy Education                 Title: PT OT SLP Therapies (Done)     Topic: Occupational Therapy (Done)     Point: ADL training (Done)     Description:   Instruct learner(s) on proper safety adaptation and remediation techniques during self care or transfers.   Instruct in proper use of assistive devices.              Learning Progress Summary           Patient Acceptance, E,TB,D,H, VU,DU by  at 3/9/2022 1450   Family Acceptance, E,TB,D,H, VU,DU by  at 3/9/2022 1450                   Point: Home exercise program (Done)     Description:   Instruct learner(s) on appropriate technique for monitoring, assisting and/or progressing therapeutic exercises/activities.              Learning Progress Summary           Patient Acceptance, E,TB,D,H, VU,DU by  at 3/9/2022 1450   Family Acceptance, E,TB,D,H, VU,DU by  at 3/9/2022 1450                   Point: Precautions (Done)     Description:   Instruct learner(s) on prescribed precautions during self-care and functional transfers.              Learning Progress Summary           Patient Acceptance, E,TB,D,H, VU,DU by  at 3/9/2022 1450   Family Acceptance, E,TB,D,H, VU,DU by  at 3/9/2022 1450                   Point: Body mechanics (Done)     Description:   Instruct learner(s) on proper positioning and spine alignment during self-care, functional mobility activities and/or exercises.              Learning Progress Summary           Patient Acceptance, E,TB,D,H, VU,DU by  at 3/9/2022 1450   Family Acceptance, E,TB,D,H, VU,DU by  at 3/9/2022 1450                               User Key     Initials Effective Dates Name Provider Type [facility] hospital 06/16/21 -  Naomi Mott OT Occupational Therapist  OT              OT Recommendation and Plan  Therapy Frequency (OT): evaluation only (plus 1 treatment)  Plan of Care Review  Plan of Care Reviewed With: patient, daughter, family  Progress: no change  Outcome Evaluation: Patient was fitted for a Rosa M Toribio postsurgical compression camisole style 2860 size medium A/B with bilateral breast forms.  Patient instructed to wear fiber filled forms when DUANE drains removed.  Patient was educated and trained on donning/doffing of camisole, wear schedule and postsurgical precautions.  Patient is pending hospital discharge today.  She was provided with educational handouts and follow-up contact number for Kayla Marroquin, breast cancer rehab specialist/certified lymphedema therapist at [facility] outpatient clinic for additional camisoles and treatment.     Time Calculation:    Time Calculation- OT     Row Name 03/09/22 1450             Time Calculation- OT    OT Received On 03/09/22  -AC              Timed Charges    57266 - OT Self Care/Mgmt Minutes 15  -AC      88318 - OT Orthotic Management 10  -AC              Untimed Charges    OT Eval/Re-eval Minutes 32  -AC      L-Codes --  shea  -AC              Total Minutes    Timed Charges Total Minutes 25  -AC      Untimed Charges Total Minutes 32  -AC       Total Minutes 57  -AC            User Key  (r) = Recorded By, (t) = Taken By, (c) = Cosigned By    Initials Name Provider Type    AC Naomi Mott OT Occupational Therapist              Therapy Charges for Today     Code Description Service Date Service Provider Modifiers Qty    81542953764 HC OT SELF CARE/MGMT/TRAIN EA 15 MIN 3/9/2022 Naomi Mott OT GO 1    62382673356 HC OT ORTHOTIC MGMT/TRAIN EA 15 MIN 3/9/2022 Naomi Mott OT GO 1    16252243282 HC OT EVAL LOW COMPLEXITY 3 3/9/2022 Naomi Mott OT GO 1    07900763781 HC CAMISOLE POST/SURG MANOLO ALL SZ/COLOR AMOENA 3/9/2022 Naomi Mott OT  1               Naomi Mott OT  3/9/2022

## 2022-03-09 NOTE — PLAN OF CARE
Goal Outcome Evaluation:      Pt discharged home. Pt understood instructions for caring for surgical site and drains, how to take prescribed medications and possible side effects as well as restrictions until reassess at follow-up appointJodi BRITO RN

## 2022-03-09 NOTE — PROGRESS NOTES
[facility]     Progress Note    Patient Name: Татьяна Jackson  : 1950  MRN: [id number]    Date of admission: 3/8/2022    Subjective   Subjective     Patient without complaints.  Pain controlled.  Vital signs stable.  Afebrile.    Objective   Objective     Review of Systems:    A 10 point review of systems was performed and all are negative except for what is documented in the HPI.     Vitals:   Temp:  [97 °F (36.1 °C)-97.88 °F (36.6 °C)] 97.88 °F (36.6 °C)  Heart Rate:  [68-86] 81  Resp:  [12-20] 18  BP: (106-162)/(58-92) 156/65  Flow (L/min):  [2] 2  FiO2 (%):  [0.6 %] 0.6 %  Physical Exam    Constitutional: Awake, alert   Eyes: PERRLA    Neck: Supple, trachea midline   Respiratory: respirations even and unlabored   Cardiovascular: RRR   Gastrointestinal: Soft, nontender, nondistended   Psychiatric: Appropriate affect, cooperative   Neurologic: Oriented x 3, speech clear   Skin: Color chest incisions intact with dressings.  DUANE drains with serosanguineous output.    Result Review    Result Review:  I have personally reviewed the results from the time of this admission to 3/9/2022 09:13 EST and agree with these findings:  []  Laboratory  []  Microbiology  []  Radiology  []  EKG/Telemetry   []  Cardiology/Vascular   []  Pathology  []  Old records  []  Other:      Assessment/Plan   Assessment / Plan     Diagnosis     Status post bilateral mastectomy with left sentinel node biopsy    Active Hospital Problems:  Active Hospital Problems    Diagnosis    • **Intraductal papillary adenocarcinoma of left breast        Plan:      Discharge home        DVT prophylaxis:  Mechanical DVT prophylaxis orders are present.          This document has been electronically signed by SHERYL Nobles  2022 09:13 EST

## 2022-03-09 NOTE — PLAN OF CARE
Goal Outcome Evaluation:  Plan of Care Reviewed With: patient        Progress: improving  Outcome Evaluation: pain well controlled with PRN pain medication. surgical dressings dry and intact. no new issues or complaints at this time. vital signs stable.

## 2022-03-09 NOTE — DISCHARGE SUMMARY
Date of Discharge:  3/9/2022    Discharge Diagnosis:   Intraductal papillary adenocarcinoma of left breast [D05.12]  Post-Op Diagnosis Codes:     * Intraductal papillary adenocarcinoma of left breast [D05.12]     Problem List:  Active Hospital Problems    Diagnosis  POA   • **Intraductal papillary adenocarcinoma of left breast [D05.12]  Unknown   • Status post bilateral mastectomy with left sentinel node biopsy [Z90.13]  Not Applicable      Resolved Hospital Problems   No resolved problems to display.       Presenting Problem/History of Present Illness          Hospital Course  Patient is a 71 y.o. female presented to [facility] on 3/8/2022 for a planned bilateral mastectomy with left sentinel node biopsy.  She was admitted after the procedure for routine postoperative care.  Upon discharge to home she is tolerating a regular diet, her pain is controlled, and she is ambulating without difficulty.  Procedures Performed    Procedure(s):  BREAST MASTECTOMY WITH SENTINEL NODE BIOPSY  -------------------       Consults:   Consults     No orders found for last 30 day(s).          Pertinent Test Results:       Vital Signs  Temp:  [97 °F (36.1 °C)-97.88 °F (36.6 °C)] 97.88 °F (36.6 °C)  Heart Rate:  [68-86] 81  Resp:  [12-20] 18  BP: (106-162)/(58-92) 156/65  FiO2 (%):  [0.6 %] 0.6 %    Discharge Disposition  Home or Self Care    Discharge Medications     Discharge Medications      New Medications      Instructions Start Date   cephalexin 500 MG capsule  Commonly known as: KEFLEX   500 mg, Oral, 4 Times Daily      HYDROcodone-acetaminophen 5-325 MG per tablet  Commonly known as: NORCO   2 tablets, Oral, Every 4 Hours PRN         Continue These Medications      Instructions Start Date   ascorbic acid 500 MG tablet  Commonly known as: VITAMIN C   500 mg, Oral      multivitamin tablet tablet   1 tablet, Oral      vitamin D3 125 MCG (5000 UT) capsule capsule   5,000 Units, Oral             Discharge Diet   Diet  Instructions     Diet: Regular      Discharge Diet: Regular          Activity at Discharge  Activity Instructions     Activity as Tolerated      Bathing Restrictions      May shower starting tomorrow, use only soap and water on incisions do not use a washcloth or sponge on incisions use a clean towel to pat incisions dry    Type of Restriction: Bathing    Bathing Restrictions: No Tub Bath    Driving Restrictions      Type of Restriction: Driving    Driving Restrictions: No Driving Until Next Appointment    Gradually Increase Activity Until at Pre-Hospitalization Level      Lifting Restrictions      Type of Restriction: Lifting    Lifting Restrictions: No Lifting Until Cleared By Provider          Follow-up Appointments  Future Appointments   Date Time Provider Department Center   3/16/2022 10:30 AM Deborah Guillaume MD [facility] GS HARET [facility]   3/25/2022  2:30 PM Jaida Cline MD PhD [facility] ONC [address] [facility]   3/29/2022  3:00 PM Kayla Steiner, OT [facility] LYMCL [facility]     Additional Instructions for the Follow-ups that You Need to Schedule     Call MD With Problems / Concerns   As directed      Instructions: Call if you develop a fever chills or body aches as these are the first signs of an infection call if you develop redness increased pain at the incision increased heat or tenderness at the incision or new drainage at the incision line    Order Comments: Instructions: Call if you develop a fever chills or body aches as these are the first signs of an infection call if you develop redness increased pain at the incision increased heat or tenderness at the incision or new drainage at the incision line          Discharge Follow-up with Specialty: [place]; 1 Week   As directed      Specialty: [place]    Follow Up: 1 Week               Test Results Pending at Discharge  Pending Labs     Order Current Status    Tissue Pathology Exam Preliminary result

## 2022-03-09 NOTE — SIGNIFICANT NOTE
03/09/22 4430   Plan   Plan Met with patient, daughter and DARIA.  Patient reports lives with leida and DARIA that assist with needs.  Family provides transportation for patient.  Verified demographics, phone, pharmacy and PCP.  Provides own IADL's.  Plans to discharge home with no needs

## 2022-03-09 NOTE — PLAN OF CARE
Goal Outcome Evaluation:  Plan of Care Reviewed With: patient, daughter, family        Progress: no change  Outcome Evaluation: Patient was fitted for a Rosa M Malu postsurgical compression camisole style 2860 size medium A/B with bilateral breast forms.  Patient instructed to wear fiber filled forms when DUANE drains removed.  Patient was educated and trained on donning/doffing of camisole, wear schedule and postsurgical precautions.  Patient is pending hospital discharge today.  She was provided with educational handouts and follow-up contact number for Kayla Marroquin, breast cancer rehab specialist/certified lymphedema therapist at [facility] outpatient clinic for additional camisoles and treatment.

## 2022-03-11 ENCOUNTER — TELEPHONE (OUTPATIENT)
Dept: GASTROENTEROLOGY | Facility: CLINIC | Age: 72
End: 2022-03-11

## 2022-03-11 NOTE — TELEPHONE ENCOUNTER
----- Message from SHERYL Ramsey sent at 3/10/2022 10:10 AM EST -----  Colon biopsies normal.  Repeat in 2 years for surveillance.  Schedule for f/u.

## 2022-03-15 ENCOUNTER — DOCUMENTATION (OUTPATIENT)
Dept: MAMMOGRAPHY | Facility: HOSPITAL | Age: 72
End: 2022-03-15

## 2022-03-15 NOTE — PROGRESS NOTES
CONTACTED [facility] IN PATHOLOGY AND THEY HAVE SENT SPECIMEN OUT FOR A CONSULT. WHEN RESULTS COME IN AND SPECIMEN HAS BEEN RETURNED, I WILL SEND FOR ONCOTYPE IF NECESSARY.

## 2022-03-15 NOTE — PROGRESS NOTES
"Chief Complaint  Follow-up    Subjective          History of Present Illness  The patient is here to follow up on bilateral mastectomy with SLN bx.  They are doing well and have no complaints.  Pathology is shown below:    1.  Left sentinel lymph node, count 700, excision:               -1 lymph node, negative for metastatic carcinoma     2.  Left sentinel lymph node #2, count 10, excision:               -2 lymph nodes, negative for metastatic carcinoma     3.  Left breast, simple mastectomy:               -Pending consultation, Dr. Haskins, [facility]     4.  Right breast, simple mastectomy:               -Atypical ductal hyperplasia (ADH)  -Atypical lobular hyperplasia (ALH)               -Nipple with intraductal papilloma                           -Microglandular adenosis               -Proliferative fibrocystic change               -Negative for malignancy      Per Pathology widely negative margins and negative nodes.  Awaiting final path report.      Objective   Vital Signs:   Resp 15   Ht 170.2 cm (67\")   Wt 77.1 kg (170 lb)   BMI 26.63 kg/m²     Physical Exam  Vitals and nursing note reviewed.   Constitutional:       General: She is not in acute distress.     Appearance: Normal appearance. She is well-developed.   HENT:      Head: Normocephalic and atraumatic.   Eyes:      Extraocular Movements: Extraocular movements intact.      Pupils: Pupils are equal, round, and reactive to light.   Cardiovascular:      Pulses: Normal pulses.   Pulmonary:      Effort: Pulmonary effort is normal. No retractions.      Breath sounds: Normal air entry. No wheezing.   Abdominal:      General: There is no distension.      Palpations: Abdomen is soft.      Tenderness: There is no abdominal tenderness.      Hernia: No hernia is present.   Musculoskeletal:         General: No swelling or deformity.      Cervical back: Neck supple.   Skin:     General: Skin is warm and dry.      Findings: No erythema.      Comments: " Surgical Incision Healing Well   Neurological:      General: No focal deficit present.      Mental Status: She is alert and oriented to person, place, and time.      Motor: Motor function is intact.   Psychiatric:         Mood and Affect: Mood normal.         Thought Content: Thought content normal.            Result Review :                 Assessment and Plan    Diagnoses and all orders for this visit:    1. Intraductal papillary adenocarcinoma of left breast (Primary)    Followup in one week    Follow Up   Return in about 1 week (around 3/23/2022).  Patient was given instructions and counseling regarding her condition or for health maintenance advice. Please see specific information pulled into the AVS if appropriate.

## 2022-03-16 ENCOUNTER — OFFICE VISIT (OUTPATIENT)
Dept: SURGERY | Facility: CLINIC | Age: 72
End: 2022-03-16

## 2022-03-16 VITALS — BODY MASS INDEX: 26.68 KG/M2 | RESPIRATION RATE: 15 BRPM | HEIGHT: 67 IN | WEIGHT: 170 LBS

## 2022-03-16 DIAGNOSIS — D05.12 INTRADUCTAL PAPILLARY ADENOCARCINOMA OF LEFT BREAST: Primary | ICD-10-CM

## 2022-03-16 PROCEDURE — 99024 POSTOP FOLLOW-UP VISIT: CPT | Performed by: SURGERY

## 2022-03-17 ENCOUNTER — TELEPHONE (OUTPATIENT)
Dept: SURGERY | Facility: CLINIC | Age: 72
End: 2022-03-17

## 2022-03-17 LAB
BEAKER LAB AP INTRAOPERATIVE CONSULTATION: NORMAL
CYTO UR: NORMAL
LAB AP CASE REPORT: NORMAL
LAB AP CLINICAL INFORMATION: NORMAL
LAB AP SPECIAL STAINS: NORMAL
LAB AP SYNOPTIC CHECKLIST: NORMAL
PATH REPORT.FINAL DX SPEC: NORMAL
PATH REPORT.GROSS SPEC: NORMAL

## 2022-03-17 NOTE — TELEPHONE ENCOUNTER
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 PLEASE ENSURE IT STATES SHE CAN RETURN ON 3-21-22. IF ANY QUESTIONS, PLEASE CALL LAINEY [phone]    Who is requesting this form or medical record from you: DAUGHTERS EMPLOYER    How would you like to receive the form or medical records (pick-up, mail, fax):   If pick-up, provide patient with address and location details LAINEY HAS ADRESS    Timeframe paperwork needed: ASAP    Additional notes: PLEASE CALL LAINEY WHEN PAPERWORK IS READY, SHE WILL .

## 2022-03-23 ENCOUNTER — APPOINTMENT (OUTPATIENT)
Dept: OCCUPATIONAL THERAPY | Facility: HOSPITAL | Age: 72
End: 2022-03-23

## 2022-03-24 ENCOUNTER — DOCUMENTATION (OUTPATIENT)
Dept: SURGERY | Facility: CLINIC | Age: 72
End: 2022-03-24

## 2022-03-24 ENCOUNTER — OFFICE VISIT (OUTPATIENT)
Dept: SURGERY | Facility: CLINIC | Age: 72
End: 2022-03-24

## 2022-03-24 VITALS — BODY MASS INDEX: 26.3 KG/M2 | WEIGHT: 167.55 LBS | RESPIRATION RATE: 16 BRPM | HEIGHT: 67 IN

## 2022-03-24 DIAGNOSIS — D05.12 INTRADUCTAL PAPILLARY ADENOCARCINOMA OF LEFT BREAST: Primary | ICD-10-CM

## 2022-03-24 PROCEDURE — 99024 POSTOP FOLLOW-UP VISIT: CPT | Performed by: SURGERY

## 2022-03-24 NOTE — PROGRESS NOTES
Chief Complaint  Follow-up    Subjective          History of Present Illness  The patient is here to follow up on bilateral mastectomy with SLN bx.  They are doing well and have no complaints.  Pathology is shown below:    Final Diagnosis   1.  Left sentinel lymph node, count 700, excision:               -1 lymph node, negative for metastatic carcinoma     2.  Left sentinel lymph node #2, count 10, excision:               -2 lymph nodes, negative for metastatic carcinoma     3.  Left breast, simple mastectomy:               -Encysted/encapsulated papillary carcinoma, nuclear grade 1-2/3, with microscopic foci of extracapsular invasion               -Breast biomarker testing (performed and interpreted at [facility]):                            - Estrogen Receptor (ER):  Positive (100%, strong)                            - Progesterone Receptor (PgR):  Positive (60%, moderate)                            - HER2 (by immunohistochemistry):  Negative (Score 0)  - Ki-67:  15%  -See comment  -See synoptic checklist below for additional information        Comment:  The main lesion appears to be an encapsulated papillary carcinoma with low to intermediate grade nuclei, associated with 2 identifiable foci of extracapsular invasion, measuring at most 2 mm in diameter.  Contiguous to the encapsulated papillary carcinoma are large nests of what represents either ductal carcinoma in situ or noninvasive solid papillary carcinoma.      The above diagnosis and comment (part 3) was rendered in expert consultation by Dr. Jacquelyn Haskins, [facility] in [place]. See separate consultation report for additional information.     4.  Right breast, simple mastectomy:               -Atypical ductal hyperplasia (ADH)  -Atypical lobular hyperplasia (ALH)               -Nipple with intraductal papilloma                           -Microglandular adenosis               -Proliferative fibrocystic change               -Negative  for malignancy                  Electronically signed by Kenia Blair MD on 3/17/2022 at 1022   Preliminary result electronically signed by Kenia Blair MD on 3/11/2022 at 1305   Synoptic Checklist     INVASIVE CARCINOMA OF THE BREAST: Resection  8th Edition - Protocol posted: 12/17/2021  INVASIVE CARCINOMA OF THE BREAST: COMPLETE EXCISION - All Specimens  SPECIMEN   Procedure  Total mastectomy    Specimen Laterality  Left    TUMOR   Histologic Type  Encapsulated papillary carcinoma with invasion    Histologic Grade ([place] Histologic Score)     Glandular (Acinar) / Tubular Differentiation  Score 3    Nuclear Pleomorphism  Score 2    Mitotic Rate  Score 1    Overall Grade  Grade 2 (scores of 6 or 7)    Tumor Size  Greatest dimension of largest invasive focus (Millimeters): 2 mm   Tumor Focality  Multiple foci of invasive carcinoma    Number of Foci  2    Ductal Carcinoma In Situ (DCIS)  Present    Architectural Patterns  Noninvasive solid papillary carcinoma         Treatment Effect in the Breast  No known presurgical therapy    MARGINS   Margin Status for Invasive Carcinoma  All margins negative for invasive carcinoma    Distance from Invasive Carcinoma to Closest Margin  10 mm   Closest Margin(s) to Invasive Carcinoma  Posterior    Margin Status for DCIS  All margins negative for DCIS    Distance from DCIS to Closest Margin  10 mm   Closest Margin(s) to DCIS  Posterior    REGIONAL LYMPH NODES   Regional Lymph Node Status  All regional lymph nodes negative for tumor    Total Number of Lymph Nodes Examined (sentinel and non-sentinel)  3    Number of [place] Nodes Examined  3    PATHOLOGIC STAGE CLASSIFICATION (pTNM, AJCC 8th Edition)   Reporting of pT, pN, and (when applicable) pM categories is based on information available to the pathologist at the time the report is issued. As per the AJCC (Chapter 1, 8th Ed.) it is the managing physician’s responsibility to establish the final pathologic  "stage based upon all pertinent information, including but potentially not limited to this pathology report.   TNM Descriptors  m (multiple foci of invasive carcinoma)    pT Category  pT1a    Regional Lymph Nodes Modifier  (sn): [place] node(s) evaluated.    pN Category  pN0    ADDITIONAL FINDINGS   Additional Findings  Duct ectasia, nonproliferative fibrocystic change               Objective   Vital Signs:   Resp 16   Ht 170.2 cm (67.01\")   Wt 76 kg (167 lb 8.8 oz)   BMI 26.24 kg/m²     Physical Exam  Vitals and nursing note reviewed.   Constitutional:       General: She is not in acute distress.     Appearance: Normal appearance. She is well-developed.   HENT:      Head: Normocephalic and atraumatic.   Eyes:      Extraocular Movements: Extraocular movements intact.      Pupils: Pupils are equal, round, and reactive to light.   Cardiovascular:      Pulses: Normal pulses.   Pulmonary:      Effort: Pulmonary effort is normal. No retractions.      Breath sounds: Normal air entry. No wheezing.   Abdominal:      General: There is no distension.      Palpations: Abdomen is soft.      Tenderness: There is no abdominal tenderness.      Hernia: No hernia is present.   Musculoskeletal:         General: No swelling or deformity.      Cervical back: Neck supple.   Skin:     General: Skin is warm and dry.      Findings: No erythema.      Comments: Surgical Incision Healing Well   Neurological:      General: No focal deficit present.      Mental Status: She is alert and oriented to person, place, and time.      Motor: Motor function is intact.   Psychiatric:         Mood and Affect: Mood normal.         Thought Content: Thought content normal.                 Assessment and Plan    Diagnoses and all orders for this visit:    1. Intraductal papillary adenocarcinoma of left breast (Primary)    Follow up in 3 months and then one year    Follow Up   Return in about 3 months (around 6/24/2022).  Patient was given instructions and " counseling regarding her condition or for health maintenance advice. Please see specific information pulled into the AVS if appropriate.

## 2022-03-25 ENCOUNTER — APPOINTMENT (OUTPATIENT)
Dept: ONCOLOGY | Facility: HOSPITAL | Age: 72
End: 2022-03-25

## 2022-03-29 ENCOUNTER — HOSPITAL ENCOUNTER (OUTPATIENT)
Dept: OCCUPATIONAL THERAPY | Facility: HOSPITAL | Age: 72
Setting detail: THERAPIES SERIES
Discharge: HOME | End: 2022-03-29

## 2022-03-29 DIAGNOSIS — C50.919 MALIGNANT NEOPLASM OF FEMALE BREAST, UNSPECIFIED ESTROGEN RECEPTOR STATUS, UNSPECIFIED LATERALITY, UNSPECIFIED SITE OF BREAST: Primary | ICD-10-CM

## 2022-03-29 DIAGNOSIS — L90.5 SCAR CONDITION AND FIBROSIS OF SKIN: ICD-10-CM

## 2022-03-29 DIAGNOSIS — Z91.89 AT RISK FOR LYMPHEDEMA: ICD-10-CM

## 2022-03-29 DIAGNOSIS — R52 PAIN: ICD-10-CM

## 2022-03-29 PROCEDURE — 97530 THERAPEUTIC ACTIVITIES: CPT

## 2022-03-29 PROCEDURE — 93702 BIS XTRACELL FLUID ANALYSIS: CPT

## 2022-03-29 PROCEDURE — 97535 SELF CARE MNGMENT TRAINING: CPT

## 2022-03-29 NOTE — THERAPY RE-EVALUATION
Outpatient Occupational Therapy Lymphedema Re-Evaluation   [place]     Patient Name: Татьяна Jackson  : 1950  MRN: [id number]  Today's Date: 3/29/2022      Visit Date: 2022    Patient Active Problem List   Diagnosis   • Ulcerative colitis (HCC)   • Primary hypertension   • Overweight (BMI 25.0-29.9)   • Arthralgia   • Right wrist pain   • Slow transit constipation   • Herpes zoster without complication   • Intraductal papillary adenocarcinoma of left breast   • Near syncope   • Hematochezia   • Status post bilateral mastectomy with left sentinel node biopsy        Past Medical History:   Diagnosis Date   • Arthritis    • Breast cancer (HCC)     left breast, diagnosed with breast cancer 2 weeks ago, [facility]   • Status post bilateral mastectomy with left sentinel node biopsy 3/9/2022   • Ulcerative colitis (HCC)         Past Surgical History:   Procedure Laterality Date   • [place]ONOSCOPY      [place]'t know name of hospital    • COLONOSCOPY N/A 3/3/2022    Procedure: COLONOSCOPY WITH BX, POLYPECTOMIES, HOT SNARE;  Surgeon: Margie Dotson MD;  Location: [facility] ENDOSCOPY;  Service: Gastroenterology;  Laterality: N/A;  COLON POLYPS, HEMORRHOIDS   • HERNIA REPAIR      UMBILICAL HERNIA REPAIR   • MASTECTOMY W/ SENTINEL NODE BIOPSY Bilateral 3/8/2022    Procedure: BREAST MASTECTOMY WITH SENTINEL NODE BIOPSY;  Surgeon: Deborah Guillaume MD;  Location: [facility] OR [facility] [place];  Service: General;  Laterality: Bilateral;   • UPPER GASTROINTESTINAL ENDOSCOPY           Visit Dx:     ICD-10-CM ICD-9-CM   1. Malignant neoplasm of female breast, unspecified estrogen receptor status, unspecified laterality, unspecified site of breast (HCC)  C50.919 174.9   2. Scar condition and fibrosis of skin  L90.5 709.2   3. Pain  R52 780.96   4. At risk for lymphedema  Z91.89 V49.89            Lymphedema     Row Name 22 1500             Subjective Pain    Able to rate subjective pain? yes  -       "Pre-Treatment Pain Level 4  -CH      Post-Treatment Pain Level 4  -CH      Subjective Pain Comment Patient is noted with aching sensation with range of motion.  -CH              Subjective Comments    Subjective Comments Patient states she has been doing some of the stretching exercises that was provided to her but she has not followed all of the exercises per her she.  -CH              Lymphedema Assessment    Lymphedema Classification LUE:;at risk/stage 0  -CH      Lymphedema Cancer Related Sx bilateral;simple mastectomy;sentinel node biopsy  -CH      Lymph Nodes Removed # 3  -CH      Positive Lymph Nodes # 0  -CH              LLIS - Physical Concerns    The amount of pain associated with my lymphedema is: 0  -CH      The amount of limb heaviness associated with my lymphedema is: 0  -CH      The amount of skin tightness associated with my lymphedema is: 0  -CH      The size of my swollen limb(s) seems: 0  -CH      Lymphedema affects the movement of my swollen limb(s): 0  -CH      The strength in my swollen limb(s) is: 0  -CH              LLIS - Psychosocial Concerns    Lymphedema affects my body image (i.e., \"how I think I look\"). 0  -CH      Lymphedema affects my socializing with others. 0  -CH      Lymphedema affects my intimate relations with spouse or partner (rate 0 if not applicable 0  -CH      Lymphedema \"gets me down\" (i.e., depression, frustration, or anger) 0  -CH      I must rely on others for help due to my lymphedema. 0  -CH      I know what to do to manage my lymphedema 3  -CH              LLIS - Functional Concerns    Lymphedema affects my ability to perform self-care activities (i.e. eating, dressing, hygiene) 0  -CH      Lymphedema affects my ability to perform routine home or work-related activities. 0  -CH      Lymphedema affects my performance of preferred leisure activities. 0  -CH      Lymphedema affects proper fit of clothing/shoes 0  -CH      Lymphedema affects my sleep 0  -CH              " Posture/Observations    Posture- WNL Posture is WNL  -              General ROM    GENERAL ROM COMMENTS B UE WFL  -CH              MMT (Manual Muscle Testing)    General MMT Comments NT this dare  -              Skin Changes/Observations    Location/Assessment Upper Quadrant  -CH      Upper Quadrant Conditions bilateral:;intact;clean  -CH      Upper Quadrant Color/Pigment bilateral:;erythema  -      Skin Observations Comment Mild erythema noted along the mastectomy incision site with left being greater than right.  -CH              L-Dex Bioimpedence Screening    L-Dex Measurement Extremity LUE  -CH      L-Dex Patient Position Standing  -CH      L-Dex UE Dominate Side Right  -CH      L-Dex UE At Risk Side Left  -CH      L-Dex UE Pre Surgical Value Yes  -CH      L-Dex UE Score 0.9  -CH      L-Dex UE Baseline Score 1.3  -CH      L-Dex UE Value Change -0.4  -CH      $ L-Dex Charge yes  -CH              Lymphedema Life Impact Scale Totals    A.  Total Q1 - Q17 (Do not include Q18) 3  -CH      B.  Total number of questions answered (Q1-Q17) 17  -CH      C. Divide A by B 0.18  -CH      D. Multiple C by 25 4.5  -CH            User Key  (r) = Recorded By, (t) = Taken By, (c) = Cosigned By    Initials Name Provider Type    Kayla Núñez OT Occupational Therapist                        Therapy Education  Education Details: Patient provided with education on the lymphedema prevention/stoplight to recoveryhandout.  Patient also provided with education on self manual lymphatic drainage.  OT also reviewed patient's HCP with patient with verbal emphasis on continuing to perform her stretching exercises as she is noted with mild decreased range of motion in the left upper shoulder.  Given: Symptoms/condition management  Program: New  How Provided: Verbal, Demonstration, Written  Provided to: Patient  Level of Understanding: Verbalized  80459 - OT Self Care/Mgmt Minutes: 30         OT Goals     Row Name 03/29/22 3861           Time Calculation    OT Goal Re-Cert Due Date 04/28/22  -           User Key  (r) = Recorded By, (t) = Taken By, (c) = Cosigned By    Initials Name Provider Type    Kayla Núñez OT Occupational Therapist              GOALS:   1. Post Breast Surgery Care/at risk for Lymphedema  LTG 1: 90 days:  As an indicator of no exacerbation of lymphedema staging, the patient will present with an L-Dex score less than [10] points from preoperative baseline.              STATUS: Met: ongoing  STG 1a:   30 days: To prevent exacerbation of mixed edema to lymphedema, patient will utilize the 2 postsurgical compression garments daily.                 STATUS: Not Met: discontinue  STG 1b: 30 days: Patient will be independent with self-manual lymphatic massage.               STATUS: Partial Met: ongoing  STG 1c: 30 days:  Patient will be independent with identification of signs and symptoms of lymphedema exasperation per stoplight to recovery education handout.              STATUS:  Partial Met: ongoing  STG 1 d: 30 days: Patient will be independent with HEP to prevent advancement in lymphedema staging.              STATUS: Partial Met: ongoing  TREATMENT:  Self Care/ADL retraining, Therapeutic Activity, Neuromuscular Re-education, Therapeutic Exercise, Bioimpedence Fluid Analysis, Post-Surgical compression garement [address] Zip-ST-High/ [employer] Camisole Kit 2860K, Orthotic Management and training,  and Manual Therapy.     OT Assessment/Plan     Row Name 03/29/22 1833          OT Assessment    Functional Limitations Performance in self-care ADL  -     Impairments Impaired lymphatic circulation  -     Assessment Comments Patient is noted with normalized lymphatic functioning this date.  Patient will benefit from continued skilled occupational therapy services to continue to evaluate ongoing lymphatic functioning and continue education on lymphedema prevention to prevent advancement in lymphedema staging.  -     OT  Rehab Potential Good  -     Patient/caregiver participated in establishment of treatment plan and goals Yes  -     Patient would benefit from skilled therapy intervention Yes  -CH            OT Plan    OT Frequency --  See duration  -     Predicted Duration of Therapy Intervention (OT) Pt. to re-evaluated 3 weeks post-surgery, 3 weeks post XRT, every 3 months from baseline for years 1-3 and every 6 months years 4 and 5  -     Planned CPT's? OT EVAL LOW COMPLEXITY: 52911;OT RE-EVAL: 83806;OT NEUROMUSC RE EDUCATION EA 15 MIN: 70433;OT THER PROC EA 15 MIN: 95541QJ;OT THER ACT EA 15 MIN: 53410QP;OT SELF CARE/MGMT/TRAIN 15 MIN: 45303;OT HOT/COLD PACK;OT ULTRASOUND EA 15 MIN: 70050;OT ELECTRIC STIM ATTD EA 15 MIN: 25394;OT MANUAL THERAPY EA 15 MIN: 42184;OT BIS XTRACELL FLUID ANALYSIS: 14433;OT ORTHO/PROSTHET CHECKOUT EA 15 MIN: 46402;OT ORTHOTIC MGMT/TRAIN EA 15 MIN: 31850  -     Planned Therapy Interventions (Optional Details) patient/family education;home exercise program;orthotic fitting/training;prosthetic fitting/training;postural re-education;strengthening;stretching;ROM (Range of Motion);manual therapy techniques  -           User Key  (r) = Recorded By, (t) = Taken By, (c) = Cosigned By    Initials Name Provider Type     Kayla Steiner OT Occupational Therapist                          Time Calculation:   Timed Charges  71004 - OT Therapeutic Activity Minutes: 15  81203 - OT Self Care/Mgmt Minutes: 30  Total Minutes  Timed Charges Total Minutes: 45   Total Minutes: 45     Therapy Charges for Today     Code Description Service Date Service Provider Modifiers Qty    72640080586 HC PT BIS XTRACELL FLUID ANALYSIS 3/29/2022 Kayla Steiner OT  1    89338323829 HC OT THERAPEUTIC ACT EA 15 MIN 3/29/2022 Kayla Steiner OT GO 1    25361664820 HC OT SELF CARE/MGMT/TRAIN EA 15 MIN 3/29/2022 Kayla Steiner OT GO 2                    Kayla Steiner OT  3/29/2022

## 2022-04-08 ENCOUNTER — TELEPHONE (OUTPATIENT)
Dept: PLASTIC SURGERY | Facility: CLINIC | Age: 72
End: 2022-04-08

## 2022-04-14 ENCOUNTER — OFFICE VISIT (OUTPATIENT)
Dept: ONCOLOGY | Facility: HOSPITAL | Age: 72
End: 2022-04-14

## 2022-04-14 VITALS
SYSTOLIC BLOOD PRESSURE: 161 MMHG | TEMPERATURE: 96.8 F | BODY MASS INDEX: 26.3 KG/M2 | HEART RATE: 98 BPM | RESPIRATION RATE: 18 BRPM | WEIGHT: 167.99 LBS | DIASTOLIC BLOOD PRESSURE: 87 MMHG | OXYGEN SATURATION: 97 %

## 2022-04-14 DIAGNOSIS — Z90.13 STATUS POST BILATERAL MASTECTOMY: ICD-10-CM

## 2022-04-14 DIAGNOSIS — D05.12 INTRADUCTAL PAPILLARY ADENOCARCINOMA OF LEFT BREAST: Primary | ICD-10-CM

## 2022-04-14 PROCEDURE — G0463 HOSPITAL OUTPT CLINIC VISIT: HCPCS | Performed by: INTERNAL MEDICINE

## 2022-04-14 PROCEDURE — 99214 OFFICE O/P EST MOD 30 MIN: CPT | Performed by: INTERNAL MEDICINE

## 2022-04-14 NOTE — PROGRESS NOTES
"Patient  Татьяна Jackson    Location  [facility] HEMATOLOGY & ONCOLOGY    Chief Complaint  No chief complaint on file.    Referring Provider: SHERYL Tracey  PCP: Jenni Petersen APRN    Subjective          Oncology/Hematology History Overview Note     Left Papillary Breast Cancer:   - Diagnostic mammogram 1/22/22: A 4cm mass with possible satellite nodules at 2:00, 8cm from the nipple.  Some lymph nodes in the left axilla also show cortical thickening.    - core biopsy of the right breast on 1/31/22: fragments of papillary breast cancer, ER+ (95%), MT+ (10%), HER2 untested  - Bilateral Mastectomy 3/8/22: Left breast with encapsulated papillary carcinoma, grade 1-2 with microscopic foci of extracapsular invasion, ER positive (100%), MT positive (60%), HER-2 negative (score 0), Ki-67 15%. Right breast with ADH and intraductal papilloma  - Patient declined oncotype testing and decline treatment with adjuvant endocrine therapy    Liver abnormality:  - low density foci in the liver noted on breast MRI.  The pt reports she has known about this 25 years and it is unchanged.       Intraductal papillary adenocarcinoma of left breast   2/8/2022 Initial Diagnosis    Intraductal papillary adenocarcinoma of left breast         HPI  Patient comes in today to discuss results of her left mastectomy.  I reviewed the pathology report which confirmed the patient had ER/MT positive left breast cancer with no axillary lymph node involvement.  She did not have Oncotype testing because she made it clear that she would not agree to chemotherapy regardless of the results.  We discussed this again.  I recommend the patient have adjuvant aromatase inhibitor therapy.  However, she does not wish to have this treatment either.  She would like to focus on diet and exercise and treating the cancer through \"healthy habits\".  She has been seen by Kayla and Occupational Therapy but has not had any significant swelling or " difficulty with range of motion of her arm.    Review of Systems   Constitutional: Positive for fatigue. Negative for appetite change, diaphoresis, fever, unexpected weight gain and unexpected weight loss.   HENT: Negative for hearing loss, sore throat and voice change.    Eyes: Negative for blurred vision, double vision, pain, redness and visual disturbance.   Respiratory: Negative for cough, shortness of breath and wheezing.    Cardiovascular: Positive for chest pain. Negative for palpitations and leg swelling.   Endocrine: Negative for cold intolerance, heat intolerance, polydipsia and polyuria.   Genitourinary: Positive for breast pain. Negative for decreased urine volume, difficulty urinating, frequency and urinary incontinence.   Musculoskeletal: Negative for arthralgias, back pain, joint swelling and myalgias.   Skin: Negative for color change, rash, skin lesions and wound.   Neurological: Negative for dizziness, seizures, numbness and headache.   Hematological: Negative for adenopathy. Does not bruise/bleed easily.   Psychiatric/Behavioral: Negative for depressed mood. The patient is not nervous/anxious.    All other systems reviewed and are negative.      Past Medical History:   Diagnosis Date   • Arthritis    • Breast cancer (HCC)     left breast, diagnosed with breast cancer 2 weeks ago, [facility]   • Status post bilateral mastectomy with left sentinel node biopsy 3/9/2022   • Ulcerative colitis (HCC)      Past Surgical History:   Procedure Laterality Date   • [place]COPY      [place]'t [place] name of hospital    • COLONOSCOPY N/A 3/3/2022    Procedure: COLONOSCOPY WITH BX, POLYPECTOMIES, HOT SNARE;  Surgeon: Margie Dotson MD;  Location: [facility] ENDOSCOPY;  Service: Gastroenterology;  Laterality: N/A;  COLON POLYPS, HEMORRHOIDS   • HERNIA REPAIR      UMBILICAL HERNIA REPAIR   • MASTECTOMY W/ SENTINEL NODE BIOPSY Bilateral 3/8/2022    Procedure: BREAST MASTECTOMY WITH SENTINEL  NODE BIOPSY;  Surgeon: Deborah Guillaume MD;  [facility] [facility] [place] [facility];  Service: General;  Laterality: Bilateral;   • UPPER GASTROINTESTINAL ENDOSCOPY       Social History     Socioeconomic History   • Marital status:    Tobacco Use   • Smoking status: Never Smoker   • Smokeless tobacco: Never Used   Vaping Use   • Vaping Use: Never used   Substance and Sexual Activity   • Alcohol use: Not Currently   • Drug use: Never   • Sexual activity: Defer     Family History   Problem Relation Age of Onset   • Cancer Sister         gallbladder   • Diabetes Mother    • Malig Hyperthermia Neg Hx        Objective   Physical Exam  General: Alert, cooperative, no acute distress, well appearing  Eyes: Anicteric sclera, PERRLA  Respiratory: normal respiratory effort  Cardiovascular: no lower extremity edema  Skin: Normal tone, no rash, no lesions  Psychiatric: Appropriate affect, intact judgment  Neurologic: No focal sensory or motor deficits, normal cognition   Musculoskeletal: Normal muscle strength and tone  Extremities: No clubbing, cyanosis, or deformities    Vitals:    04/14/22 1524   BP: 161/87   Pulse: 98   Resp: 18   Temp: 96.8 °F (36 °C)   SpO2: 97%   Weight: 76.2 kg (167 lb 15.9 oz)   PainSc:   4   PainLoc: Chest     ECOG score: 0         PHQ-9 Total Score:         Result Review :   The following data was reviewed by: Jaida Cline MD PhD on 04/14/2022:  Lab Results   Component Value Date    HGB 14.3 10/07/2021    HCT 43.9 10/07/2021    MCV 82.1 10/07/2021     10/07/2021    WBC 6.65 10/07/2021    NEUTROABS 3.09 10/07/2021    LYMPHSABS 2.68 08/13/2021    MONOSABS 0.53 08/13/2021    EOSABS 0.20 10/07/2021    BASOSABS 0.07 10/07/2021     Lab Results   Component Value Date    GLUCOSE 85 10/07/2021    BUN 12 10/07/2021    CREATININE 0.70 12/30/2021     10/07/2021    K 3.8 10/07/2021     10/07/2021    CO2 25.6 10/07/2021    CALCIUM 9.4 10/07/2021    PROTEINTOT 7.1 10/07/2021    ALBUMIN 4.50  10/07/2021    BILITOT 0.3 10/07/2021    ALKPHOS 74 10/07/2021    AST 21 10/07/2021    ALT 22 10/07/2021          Assessment and Plan    There are no diagnoses linked to this encounter.      Patient was given instructions and counseling regarding her condition or for health maintenance advice. Please see specific information pulled into the AVS if appropriate.     ER/NV+ Left Breast Cancer: s/p left mastectomy. Pt declines adjuvant endocrine therapy.  I will f/u with her in 6 months and we can discuss whether she is interested in repeat screening mammograms of the right breast.

## 2022-06-13 ENCOUNTER — TELEPHONE (OUTPATIENT)
Dept: SURGERY | Facility: CLINIC | Age: 72
End: 2022-06-13

## 2022-06-13 NOTE — TELEPHONE ENCOUNTER
Caller: Татьяна Jackson    Relationship to patient: Self    Best call back number: [phone]    Patient is needing: PT RETURNING A MISSED CALL FROM KRISTOPHER. UNABLE TO WARM TRANSFER TO THE OFFICE.     PT WOULD LIKE A CALL BACK, ANYTIME, OKAY TO LEAVE A VM

## 2022-06-22 NOTE — PROGRESS NOTES
Chief Complaint  Follow-up    Subjective          History of Present Illness  The patient is here to follow up on bilateral mastectomy with SLN bx.  They are doing well and have no complaints.  Pathology is shown below:    Final Diagnosis   1.  Left sentinel lymph node, count 700, excision:               -1 lymph node, negative for metastatic carcinoma     2.  Left sentinel lymph node #2, count 10, excision:               -2 lymph nodes, negative for metastatic carcinoma     3.  Left breast, simple mastectomy:               -Encysted/encapsulated papillary carcinoma, nuclear grade 1-2/3, with microscopic foci of extracapsular invasion               -Breast biomarker testing (performed and interpreted at [facility]):                            - Estrogen Receptor (ER):  Positive (100%, strong)                            - Progesterone Receptor (PgR):  Positive (60%, moderate)                            - HER2 (by immunohistochemistry):  Negative (Score 0)  - Ki-67:  15%  -See comment  -See synoptic checklist below for additional information        Comment:  The main lesion appears to be an encapsulated papillary carcinoma with low to intermediate grade nuclei, associated with 2 identifiable foci of extracapsular invasion, measuring at most 2 mm in diameter.  Contiguous to the encapsulated papillary carcinoma are large nests of what represents either ductal carcinoma in situ or noninvasive solid papillary carcinoma.      The above diagnosis and comment (part 3) was rendered in expert consultation by Dr. Jacquelyn Haskins, [facility] in [place]. See separate consultation report for additional information.     4.  Right breast, simple mastectomy:               -Atypical ductal hyperplasia (ADH)  -Atypical lobular hyperplasia (ALH)               -Nipple with intraductal papilloma                           -Microglandular adenosis               -Proliferative fibrocystic change               -Negative  for malignancy                  Electronically signed by Kenia Blair MD on 3/17/2022 at 1022   Preliminary result electronically signed by Kenia Blair MD on 3/11/2022 at 1305   Synoptic Checklist     INVASIVE CARCINOMA OF THE BREAST: Resection  8th Edition - Protocol posted: 12/17/2021  INVASIVE CARCINOMA OF THE BREAST: COMPLETE EXCISION - All Specimens  SPECIMEN   Procedure  Total mastectomy    Specimen Laterality  Left    TUMOR   Histologic Type  Encapsulated papillary carcinoma with invasion    Histologic Grade ([place] Histologic Score)     Glandular (Acinar) / Tubular Differentiation  Score 3    Nuclear Pleomorphism  Score 2    Mitotic Rate  Score 1    Overall Grade  Grade 2 (scores of 6 or 7)    Tumor Size  Greatest dimension of largest invasive focus (Millimeters): 2 mm   Tumor Focality  Multiple foci of invasive carcinoma    Number of Foci  2    Ductal Carcinoma In Situ (DCIS)  Present    Architectural Patterns  Noninvasive solid papillary carcinoma         Treatment Effect in the Breast  No known presurgical therapy    MARGINS   Margin Status for Invasive Carcinoma  All margins negative for invasive carcinoma    Distance from Invasive Carcinoma to Closest Margin  10 mm   Closest Margin(s) to Invasive Carcinoma  Posterior    Margin Status for DCIS  All margins negative for DCIS    Distance from DCIS to Closest Margin  10 mm   Closest Margin(s) to DCIS  Posterior    REGIONAL LYMPH NODES   Regional Lymph Node Status  All regional lymph nodes negative for tumor    Total Number of Lymph Nodes Examined (sentinel and non-sentinel)  3    Number of [place] Nodes Examined  3    PATHOLOGIC STAGE CLASSIFICATION (pTNM, AJCC 8th Edition)   Reporting of pT, pN, and (when applicable) pM categories is based on information available to the pathologist at the time the report is issued. As per the AJCC (Chapter 1, 8th Ed.) it is the managing physician’s responsibility to establish the final pathologic  stage based upon all pertinent information, including but potentially not limited to this pathology report.   TNM Descriptors  m (multiple foci of invasive carcinoma)    pT Category  pT1a    Regional Lymph Nodes Modifier  (sn): [place] node(s) evaluated.    pN Category  pN0    ADDITIONAL FINDINGS   Additional Findings  Duct ectasia, nonproliferative fibrocystic change               Objective   Vital Signs:   There were no vitals taken for this visit.    Physical Exam  Vitals and nursing note reviewed.   Constitutional:       General: She is not in acute distress.     Appearance: Normal appearance. She is well-developed.   HENT:      Head: Normocephalic and atraumatic.   Eyes:      Extraocular Movements: Extraocular movements intact.      Pupils: Pupils are equal, round, and reactive to light.   Cardiovascular:      Pulses: Normal pulses.   Pulmonary:      Effort: Pulmonary effort is normal. No retractions.      Breath sounds: Normal air entry. No wheezing.   Abdominal:      General: There is no distension.      Palpations: Abdomen is soft.      Tenderness: There is no abdominal tenderness.      Hernia: No hernia is present.   Musculoskeletal:         General: No swelling or deformity.      Cervical back: Neck supple.   Skin:     General: Skin is warm and dry.      Findings: No erythema.      Comments: Surgical Incision Healing Well   Neurological:      General: No focal deficit present.      Mental Status: She is alert and oriented to person, place, and time.      Motor: Motor function is intact.   Psychiatric:         Mood and Affect: Mood normal.         Thought Content: Thought content normal.            Assessment and Plan    Diagnoses and all orders for this visit:    1. Intraductal papillary adenocarcinoma of left breast (Primary)    Follow up in March for annual exam    Follow Up   Return in about 9 months (around 3/22/2023).  Patient was given instructions and counseling regarding her condition or for  health maintenance advice. Please see specific information pulled into the AVS if appropriate.

## 2022-06-23 ENCOUNTER — OFFICE VISIT (OUTPATIENT)
Dept: SURGERY | Facility: CLINIC | Age: 72
End: 2022-06-23

## 2022-06-23 VITALS — BODY MASS INDEX: 25.71 KG/M2 | HEIGHT: 67 IN | RESPIRATION RATE: 17 BRPM | WEIGHT: 163.8 LBS

## 2022-06-23 DIAGNOSIS — D05.12 INTRADUCTAL PAPILLARY ADENOCARCINOMA OF LEFT BREAST: Primary | ICD-10-CM

## 2022-06-23 PROCEDURE — 99212 OFFICE O/P EST SF 10 MIN: CPT | Performed by: SURGERY

## 2022-06-28 ENCOUNTER — NURSE NAVIGATOR (OUTPATIENT)
Dept: ONCOLOGY | Facility: HOSPITAL | Age: 72
End: 2022-06-28

## 2022-06-28 NOTE — PROGRESS NOTES
Called patient today and introduced myself and my role at the Cancer [facility].  I explained and gave details about our Survivorship Program.  Patient is agreeable to be scheduled.  Patient scheduled for July 20th at 11:15.  Patient given appointment date and time.  Verbal read back was obtained. I thanked the patient for talking with me and told her I looked forward to meeting her.

## 2022-06-29 ENCOUNTER — APPOINTMENT (OUTPATIENT)
Dept: GENERAL RADIOLOGY | Facility: HOSPITAL | Age: 72
End: 2022-06-29

## 2022-06-29 ENCOUNTER — HOSPITAL ENCOUNTER (OUTPATIENT)
Dept: OCCUPATIONAL THERAPY | Facility: HOSPITAL | Age: 72
Setting detail: THERAPIES SERIES
Discharge: HOME | End: 2022-06-29

## 2022-06-29 ENCOUNTER — APPOINTMENT (OUTPATIENT)
Dept: CT IMAGING | Facility: HOSPITAL | Age: 72
End: 2022-06-29

## 2022-06-29 ENCOUNTER — HOSPITAL ENCOUNTER (EMERGENCY)
Facility: HOSPITAL | Age: 72
Discharge: HOME | End: 2022-06-29
Attending: EMERGENCY MEDICINE | Admitting: EMERGENCY MEDICINE

## 2022-06-29 ENCOUNTER — TELEPHONE (OUTPATIENT)
Dept: ONCOLOGY | Facility: HOSPITAL | Age: 72
End: 2022-06-29

## 2022-06-29 VITALS
DIASTOLIC BLOOD PRESSURE: 78 MMHG | TEMPERATURE: 98.5 F | OXYGEN SATURATION: 96 % | BODY MASS INDEX: 28.96 KG/M2 | SYSTOLIC BLOOD PRESSURE: 136 MMHG | WEIGHT: 184.53 LBS | HEART RATE: 75 BPM | HEIGHT: 67 IN | RESPIRATION RATE: 18 BRPM

## 2022-06-29 DIAGNOSIS — Z01.818 ENCOUNTER FOR PREOPERATIVE ASSESSMENT: ICD-10-CM

## 2022-06-29 DIAGNOSIS — S70.00XA CONTUSION OF HIP REGION: ICD-10-CM

## 2022-06-29 DIAGNOSIS — S09.90XA MINOR CLOSED HEAD INJURY: ICD-10-CM

## 2022-06-29 DIAGNOSIS — Z91.89 AT RISK FOR LYMPHEDEMA: ICD-10-CM

## 2022-06-29 DIAGNOSIS — C50.919 MALIGNANT NEOPLASM OF FEMALE BREAST, UNSPECIFIED ESTROGEN RECEPTOR STATUS, UNSPECIFIED LATERALITY, UNSPECIFIED SITE OF BREAST: Primary | ICD-10-CM

## 2022-06-29 DIAGNOSIS — R52 PAIN: ICD-10-CM

## 2022-06-29 DIAGNOSIS — R55 SYNCOPE AND COLLAPSE: Primary | ICD-10-CM

## 2022-06-29 DIAGNOSIS — L90.5 SCAR CONDITION AND FIBROSIS OF SKIN: ICD-10-CM

## 2022-06-29 LAB
ALBUMIN SERPL-MCNC: 4.4 G/DL (ref 3.5–5.2)
ALBUMIN/GLOB SERPL: 1.3 G/DL
ALP SERPL-CCNC: 77 U/L (ref 39–117)
ALT SERPL W P-5'-P-CCNC: 11 U/L (ref 1–33)
ANION GAP SERPL CALCULATED.3IONS-SCNC: 10.3 MMOL/L (ref 5–15)
AST SERPL-CCNC: 16 U/L (ref 1–32)
BASOPHILS # BLD AUTO: 0.05 10*3/MM3 (ref 0–0.2)
BASOPHILS NFR BLD AUTO: 0.5 % (ref 0–1.5)
BILIRUB SERPL-MCNC: 0.2 MG/DL (ref 0–1.2)
BUN SERPL-MCNC: 14 MG/DL (ref 8–23)
BUN/CREAT SERPL: 15.1 (ref 7–25)
CALCIUM SPEC-SCNC: 9.8 MG/DL (ref 8.6–10.5)
CHLORIDE SERPL-SCNC: 107 MMOL/L (ref 98–107)
CO2 SERPL-SCNC: 25.7 MMOL/L (ref 22–29)
CREAT SERPL-MCNC: 0.93 MG/DL (ref 0.57–1)
DEPRECATED RDW RBC AUTO: 43.8 FL (ref 37–54)
EGFRCR SERPLBLD CKD-EPI 2021: 65 ML/MIN/1.73
EOSINOPHIL # BLD AUTO: 0.23 10*3/MM3 (ref 0–0.4)
EOSINOPHIL NFR BLD AUTO: 2.5 % (ref 0.3–6.2)
ERYTHROCYTE [DISTWIDTH] IN BLOOD BY AUTOMATED COUNT: 14.6 % (ref 12.3–15.4)
GLOBULIN UR ELPH-MCNC: 3.3 GM/DL
GLUCOSE SERPL-MCNC: 101 MG/DL (ref 65–99)
HCT VFR BLD AUTO: 42.4 % (ref 34–46.6)
HGB BLD-MCNC: 13.7 G/DL (ref 12–15.9)
HOLD SPECIMEN: NORMAL
HOLD SPECIMEN: NORMAL
IMM GRANULOCYTES # BLD AUTO: 0.01 10*3/MM3 (ref 0–0.05)
IMM GRANULOCYTES NFR BLD AUTO: 0.1 % (ref 0–0.5)
LYMPHOCYTES # BLD AUTO: 2.05 10*3/MM3 (ref 0.7–3.1)
LYMPHOCYTES NFR BLD AUTO: 22.2 % (ref 19.6–45.3)
MAGNESIUM SERPL-MCNC: 2.4 MG/DL (ref 1.6–2.4)
MCH RBC QN AUTO: 26.9 PG (ref 26.6–33)
MCHC RBC AUTO-ENTMCNC: 32.3 G/DL (ref 31.5–35.7)
MCV RBC AUTO: 83.3 FL (ref 79–97)
MONOCYTES # BLD AUTO: 0.49 10*3/MM3 (ref 0.1–0.9)
MONOCYTES NFR BLD AUTO: 5.3 % (ref 5–12)
NEUTROPHILS NFR BLD AUTO: 6.39 10*3/MM3 (ref 1.7–7)
NEUTROPHILS NFR BLD AUTO: 69.4 % (ref 42.7–76)
NRBC BLD AUTO-RTO: 0 /100 WBC (ref 0–0.2)
PLATELET # BLD AUTO: 246 10*3/MM3 (ref 140–450)
PMV BLD AUTO: 11.5 FL (ref 6–12)
POTASSIUM SERPL-SCNC: 3.5 MMOL/L (ref 3.5–5.2)
PROT SERPL-MCNC: 7.7 G/DL (ref 6–8.5)
RBC # BLD AUTO: 5.09 10*6/MM3 (ref 3.77–5.28)
SODIUM SERPL-SCNC: 143 MMOL/L (ref 136–145)
TROPONIN T SERPL-MCNC: <0.01 NG/ML (ref 0–0.03)
WBC NRBC COR # BLD: 9.22 10*3/MM3 (ref 3.4–10.8)
WHOLE BLOOD HOLD COAG: NORMAL
WHOLE BLOOD HOLD SPECIMEN: NORMAL

## 2022-06-29 PROCEDURE — 70450 CT HEAD/BRAIN W/O DYE: CPT

## 2022-06-29 PROCEDURE — 73523 X-RAY EXAM HIPS BI 5/> VIEWS: CPT

## 2022-06-29 PROCEDURE — 96375 TX/PRO/DX INJ NEW DRUG ADDON: CPT

## 2022-06-29 PROCEDURE — 97535 SELF CARE MNGMENT TRAINING: CPT

## 2022-06-29 PROCEDURE — 80053 COMPREHEN METABOLIC PANEL: CPT | Performed by: EMERGENCY MEDICINE

## 2022-06-29 PROCEDURE — 84484 ASSAY OF TROPONIN QUANT: CPT | Performed by: EMERGENCY MEDICINE

## 2022-06-29 PROCEDURE — 99284 EMERGENCY DEPT VISIT MOD MDM: CPT

## 2022-06-29 PROCEDURE — 85025 COMPLETE CBC W/AUTO DIFF WBC: CPT | Performed by: EMERGENCY MEDICINE

## 2022-06-29 PROCEDURE — 83735 ASSAY OF MAGNESIUM: CPT | Performed by: EMERGENCY MEDICINE

## 2022-06-29 PROCEDURE — 25010000002 HYDROMORPHONE 1 MG/ML SOLUTION: Performed by: EMERGENCY MEDICINE

## 2022-06-29 PROCEDURE — 93005 ELECTROCARDIOGRAM TRACING: CPT | Performed by: EMERGENCY MEDICINE

## 2022-06-29 PROCEDURE — 96374 THER/PROPH/DIAG INJ IV PUSH: CPT

## 2022-06-29 PROCEDURE — 25010000002 ONDANSETRON PER 1 MG: Performed by: EMERGENCY MEDICINE

## 2022-06-29 PROCEDURE — 93702 BIS XTRACELL FLUID ANALYSIS: CPT

## 2022-06-29 RX ORDER — ONDANSETRON 2 MG/ML
4 INJECTION INTRAMUSCULAR; INTRAVENOUS ONCE
Status: COMPLETED | OUTPATIENT
Start: 2022-06-29 | End: 2022-06-29

## 2022-06-29 RX ORDER — HYDROCODONE BITARTRATE AND ACETAMINOPHEN 5; 325 MG/1; MG/1
1 TABLET ORAL EVERY 6 HOURS PRN
Qty: 12 TABLET | Refills: 0 | Status: SHIPPED | OUTPATIENT
Start: 2022-06-29

## 2022-06-29 RX ORDER — SODIUM CHLORIDE 0.9 % (FLUSH) 0.9 %
10 SYRINGE (ML) INJECTION AS NEEDED
Status: DISCONTINUED | OUTPATIENT
Start: 2022-06-29 | End: 2022-06-29 | Stop reason: HOSPADM

## 2022-06-29 RX ADMIN — HYDROMORPHONE HYDROCHLORIDE 0.5 MG: 1 INJECTION, SOLUTION INTRAMUSCULAR; INTRAVENOUS; SUBCUTANEOUS at 13:59

## 2022-06-29 RX ADMIN — ONDANSETRON 4 MG: 2 INJECTION INTRAMUSCULAR; INTRAVENOUS at 13:59

## 2022-06-29 NOTE — TELEPHONE ENCOUNTER
Diagnosis: Breast cancer    Reason for referral: Financial assistance    Comments: [facility] assistance requested by lymphedema therapist, Kayla MUNROE, reporting pt inquired about applying for financial assistance towards medical/surgey expenses. Attempted to reach pt via telephone to discuss opportunity to apply for [facility]'s financial assistance program (FAP). Left detailed VM with my direct number where I may be reached back at ([phone]). [facility] will mail pt the FAP application along with my business card. [facility] support remains available.    Services/Referrals Provided: Financial assistance - FAP

## 2022-06-29 NOTE — THERAPY RE-EVALUATION
Outpatient Occupational Therapy Lymphedema Re-Evaluation   [place]     Patient Name: Татьяна Jackson  : 1950  MRN: [id number]  Today's Date: 2022      Visit Date: 2022    Patient Active Problem List   Diagnosis   • Ulcerative colitis (HCC)   • Primary hypertension   • Overweight (BMI 25.0-29.9)   • Arthralgia   • Right wrist pain   • Slow transit constipation   • Herpes zoster without complication   • Intraductal papillary adenocarcinoma of left breast   • Near syncope   • Hematochezia   • Status post bilateral mastectomy with left sentinel node biopsy        Past Medical History:   Diagnosis Date   • Arthritis    • Breast cancer (HCC)     left breast, diagnosed with breast cancer 2 weeks ago, [facility]   • Status post bilateral mastectomy with left sentinel node biopsy 3/9/2022   • Ulcerative colitis (HCC)         Past Surgical History:   Procedure Laterality Date   • [place]OS[place] name of hospital    • COLONOSCOPY N/A 3/3/2022    Procedure: COLONOSCOPY WITH BX, POLYPECTOMIES, HOT SNARE;  Surgeon: Margie Dotson MD;  Location: [facility] ENDOSCOPY;  Service: Gastroenterology;  Laterality: N/A;  COLON POLYPS, HEMORRHOIDS   • HERNIA REPAIR      UMBILICAL HERNIA REPAIR   • MASTECTOMY W/ SENTINEL NODE BIOPSY Bilateral 3/8/2022    Procedure: BREAST MASTECTOMY WITH SENTINEL NODE BIOPSY;  Surgeon: Deborah Guillaume MD;  [place] [facility] [place] [facility];  Service: General;  Laterality: Bilateral;   • UPPER GASTROINTESTINAL ENDOSCOPY           Visit Dx:     ICD-10-CM ICD-9-CM   1. Malignant neoplasm of female breast, unspecified estrogen receptor status, unspecified laterality, unspecified site of breast (HCC)  C50.919 174.9   2. Scar condition and fibrosis of skin  L90.5 709.2   3. Pain  R52 780.96   4. At risk for lymphedema  Z91.89 V49.89   5. Encounter for preoperative assessment  Z01.818 V72.84            Lymphedema     Row Name 22 1100              "Subjective Pain    Able to rate subjective pain? yes  -CH      Pre-Treatment Pain Level 9  -CH      Post-Treatment Pain Level 9  -CH      Subjective Pain Comment R knee  -CH              Subjective Comments    Subjective Comments Patient states she has had intermittent swelling but nothing that is overly concerned her.  -CH              Lymphedema Assessment    Lymphedema Classification LUE:;at risk/stage 0  -CH      Lymphedema Cancer Related Sx bilateral;simple mastectomy;sentinel node biopsy  -CH      Lymph Nodes Removed # 3  -CH      Positive Lymph Nodes # 0  -CH              LLIS - Physical Concerns    The amount of pain associated with my lymphedema is: 0  -CH      The amount of limb heaviness associated with my lymphedema is: 0  -CH      The amount of skin tightness associated with my lymphedema is: 0  -CH      The size of my swollen limb(s) seems: 0  -CH      Lymphedema affects the movement of my swollen limb(s): 0  -CH      The strength in my swollen limb(s) is: 0  -CH              LLIS - Psychosocial Concerns    Lymphedema affects my body image (i.e., \"how I think I look\"). 0  -CH      Lymphedema affects my socializing with others. 0  -CH      Lymphedema affects my intimate relations with spouse or partner (rate 0 if not applicable 0  -CH      Lymphedema \"gets me down\" (i.e., depression, frustration, or anger) 0  -CH      I must rely on others for help due to my lymphedema. 0  -CH      I know what to do to manage my lymphedema 2  -CH              LLIS - Functional Concerns    Lymphedema affects my ability to perform self-care activities (i.e. eating, dressing, hygiene) 0  -CH      Lymphedema affects my ability to perform routine home or work-related activities. 0  -CH      Lymphedema affects my performance of preferred leisure activities. 0  -CH      Lymphedema affects proper fit of clothing/shoes 0  -CH              Posture/Observations    Posture- WNL Posture is WNL  -CH              General ROM    " GENERAL ROM COMMENTS BUE WFL  -CH              MMT (Manual Muscle Testing)    General MMT Comments BUE WFL  -CH              L-Dex Bioimpedence Screening    L-Dex Measurement Extremity LUE  -CH      L-Dex Patient Position Standing  -CH      L-Dex UE Dominate Side Right  -CH      L-Dex UE At Risk Side Left  -CH      L-Dex UE Pre Surgical Value Yes  -CH      L-Dex UE Score 1  -CH      L-Dex UE Baseline Score 1.3  -CH      L-Dex UE Value Change -0.3  -CH      $ L-Dex Charge yes  -CH              Lymphedema Life Impact Scale Totals    A.  Total Q1 - Q17 (Do not include Q18) 2  -CH      B.  Total number of questions answered (Q1-Q17) 16  -CH      C. Divide A by B 0.13  -CH      D. Multiple C by 25 3.25  -CH            User Key  (r) = Recorded By, (t) = Taken By, (c) = Cosigned By    Initials Name Provider Type     Kayla Steiner OT Occupational Therapist                        Therapy Education  Education Details: OT reviewed lymphedema prevention/stoplight to recovery education.  Given: Symptoms/condition management  Program: New  How Provided: Verbal  Provided to: Patient  Level of Understanding: Verbalized  68846 - OT Self Care/Mgmt Minutes: 15         OT Goals     Row Name 06/29/22 1659          Time Calculation    OT Goal Re-Cert Due Date 07/29/22  -           User Key  (r) = Recorded By, (t) = Taken By, (c) = Cosigned By    Initials Name Provider Type     Kayla Steiner OT Occupational Therapist                 OT Assessment/Plan     Row Name 06/29/22 6979          OT Assessment    Functional Limitations Performance in self-care ADL  -CH     Impairments Impaired lymphatic circulation  -     Assessment Comments Patient is demonstrating normalized lymphatic functioning this date patient will benefit from continued skilled occupational therapy services to evaluate ongoing lymphatic functioning to prevent advancement in lymphedema staging.  -     Patient/caregiver participated in establishment of  treatment plan and goals Yes  -     Patient would benefit from skilled therapy intervention Yes  -CH            OT Plan    OT Frequency Other (comment)  See duration  -     Planned CPT's? OT EVAL LOW COMPLEXITY: 02679;OT RE-EVAL: 00299;OT NEUROMUSC RE EDUCATION EA 15 MIN: 03596;OT THER PROC EA 15 MIN: 63023WM;OT THER ACT EA 15 MIN: 11560HN;OT SELF CARE/MGMT/TRAIN 15 MIN: 74152;OT HOT/COLD PACK;OT ULTRASOUND EA 15 MIN: 79515;OT ELECTRIC STIM ATTD EA 15 MIN: 26935;OT MANUAL THERAPY EA 15 MIN: 96008;OT BIS XTRACELL FLUID ANALYSIS: 77219;OT ORTHO/PROSTHET CHECKOUT EA 15 MIN: 13545;OT ORTHOTIC MGMT/TRAIN EA 15 MIN: 53934  -     Planned Therapy Interventions (Optional Details) patient/family education;home exercise program;orthotic fitting/training;prosthetic fitting/training;postural re-education;strengthening;stretching;ROM (Range of Motion);manual therapy techniques  -           User Key  (r) = Recorded By, (t) = Taken By, (c) = Cosigned By    Initials Name Provider Type     Kayla Steiner OT Occupational Therapist              GOALS:   1. Post Breast Surgery Care/at risk for Lymphedema  LTG 1: 90 days:  As an indicator of no exacerbation of lymphedema staging, the patient will present with an L-Dex score less than [10] points from preoperative baseline.              STATUS: Met: ongoing  STG 1a:   30 days: To prevent exacerbation of mixed edema to lymphedema, patient will utilize the 2 postsurgical compression garments daily.                 STATUS: Not Met: discontinue  STG 1b: 30 days: Patient will be independent with self-manual lymphatic massage.               STATUS: Partial Met: ongoing  STG 1c: 30 days:  Patient will be independent with identification of signs and symptoms of lymphedema exasperation per stoplight to recovery education handout.              STATUS:  Partial Met: ongoing  STG 1 d: 30 days: Patient will be independent with HEP to prevent advancement in lymphedema  staging.              STATUS: Partial Met: ongoing  TREATMENT:  Self Care/ADL retraining, Therapeutic Activity, Neuromuscular Re-education, Therapeutic Exercise, Bioimpedence Fluid Analysis, [facility]-Surgical compression garement [facility] [employer]60[employer], Orthotic Management and training,  and Manual Therapy.            Time Calculation:   Timed Charges  56940 - OT Self Care/Mgmt Minutes: 15  Total Minutes  Timed Charges Total Minutes: 15   Total Minutes: 15     Therapy Charges for Today     Code Description Service Date Service Provider Modifiers Qty    19516604632 HC PT BIS XTRACELL FLUID ANALYSIS 6/29/2022 Kayla Steiner OT  1    51105231625 HC OT SELF CARE/MGMT/TRAIN EA 15 MIN 6/29/2022 Kayla Steiner OT GO 1                    Kayla Steiner OT  6/29/2022

## 2022-06-30 LAB — QT INTERVAL: 419 MS

## 2022-07-20 ENCOUNTER — NURSE NAVIGATOR (OUTPATIENT)
Dept: ONCOLOGY | Facility: HOSPITAL | Age: 72
End: 2022-07-20

## 2022-07-20 ENCOUNTER — OFFICE VISIT (OUTPATIENT)
Dept: ONCOLOGY | Facility: HOSPITAL | Age: 72
End: 2022-07-20

## 2022-07-20 ENCOUNTER — TELEPHONE (OUTPATIENT)
Dept: ONCOLOGY | Facility: HOSPITAL | Age: 72
End: 2022-07-20

## 2022-07-20 VITALS
OXYGEN SATURATION: 100 % | SYSTOLIC BLOOD PRESSURE: 139 MMHG | TEMPERATURE: 98.1 F | HEART RATE: 107 BPM | BODY MASS INDEX: 28.9 KG/M2 | WEIGHT: 184.53 LBS | RESPIRATION RATE: 18 BRPM | DIASTOLIC BLOOD PRESSURE: 86 MMHG

## 2022-07-20 DIAGNOSIS — Z90.13 STATUS POST BILATERAL MASTECTOMY: ICD-10-CM

## 2022-07-20 DIAGNOSIS — D05.12 INTRADUCTAL PAPILLARY ADENOCARCINOMA OF LEFT BREAST: Primary | ICD-10-CM

## 2022-07-20 DIAGNOSIS — E66.3 OVERWEIGHT (BMI 25.0-29.9): ICD-10-CM

## 2022-07-20 DIAGNOSIS — I15.9 SECONDARY HYPERTENSION: ICD-10-CM

## 2022-07-20 PROCEDURE — G0463 HOSPITAL OUTPT CLINIC VISIT: HCPCS | Performed by: NURSE PRACTITIONER

## 2022-07-20 PROCEDURE — 99214 OFFICE O/P EST MOD 30 MIN: CPT | Performed by: NURSE PRACTITIONER

## 2022-07-20 NOTE — PROGRESS NOTES
Nurse Navigator Evaluation     07/20/2022  Татьяна Jackson    Care Plan Delivery Method: Patient Visit    Care Plan Delivered to PCP Via: Fax    Referrals: Psychosocial Coordinator    Education Material Provided on: Care Plan, Diagnosis, Exercise, Nutrition, Support Groups, Survivorship and updated provider directory    [employer] Acuity Tool:  2    Diagnosis:   Breast Cancer    Stage:  Cancer Staging  pT1a,pN0    New Cancer Screening:   Patient is followed and is current with her PCP Jenni Petersen.  Ms Lackey to keep all follow up appointments.     Nurse Navigator Intervention:   Survivorship discussion held today.  Met with patient and her daughter in the clinic and a written copy of her Survivorship Care Plan was given, reviewed and discussed.  Diagnosis, staging and treatment summary, along with signs and symptoms of cancer recurrence were reviewed.  Educational materials pertaining to her cancer diagnosis provided and placed within her Survivorship folder.  Follow up surveillance and importance of preventive wellness exams discussed.  Information on cancer surveillance and screenings provided within patients care plan. Patient was provided with an updated provider directory.  Referral placed to Oncology Social Worker.      Distress Screening Score: 4 (Grief over loss of son)    Time Spent Evaluating: I spent 40 minutes caring for Татьяна on this date of service. This time includes time spent by me in the following activities: preparing for the visit by way of obtaining and reviewing patient history, communicating with members of the healthcare team, creating a care plan, discussion of care plan with the patient and allowing time to answer patient questions.     Rachel Samano RN  7/20/2022

## 2022-07-20 NOTE — TELEPHONE ENCOUNTER
Distress Screening Follow-Up    Diagnosis: Breast cancer    Date of Distress Screenin22  Location of Distress Screening: Survivorship clinic  Distress Level: 4  Problems Indicated: Pain, grief or loss    Comments: OSW attempted to contact pt via telephone to f/u in regards to identified distress. Pt was seen in the survivorship clinic this morning and reported experiencing grief due to recently losing her son. Pt is interested in obtaining grief support resources. Attempted to reach pt via telephone. Unable to leave a VM due to mailbox being full. Left an SMS message with my direct phone number where I may be reached back at. Will try again later. OSW support remains available.    Update : Received return VM from pt this afternoon. Returned pt's call. Pt's son unexpectedly passed away back in May. Provided emotional support, utilizing empathy and validating and normalizing identified emotions. Provided pt with education regarding grief and the grieving process. Discussed opportunity for OSW to get pt connected to services such as individual grief counseling, grief support group, etc. Pt is open to learning more about the resources available, however, feels she is not ready yet to attend a group environment. Mailed pt information today regarding grief and bereavement, coping mechanisms, local grief support groups, etc., along with my business card, encouraging OSW support remains available. Pt expressed gratitude.    Services/Referrals Provided: Emotional support, grief education and resources - grief counseling, grief support groups, coping mechanisms

## 2022-07-20 NOTE — PROGRESS NOTES
MEDICAL ONCOLOGY CANCER SURVIVORSHIP VISIT    Татьяна Jackson  [id number]  1950    Chief Complaint: Survivorship        History of present illness:  Татьяна Jackson is a 72 y.o. year old female who is here today for the Cancer Survivorship visit, see oncology history below.     She presents with her daughter today for survivorship visit.     See cancer history below.     She is mostly doing well in the interim.   Distress score of 5. Wants to speak to onc SS regarding counseling associated with familial issues she has experienced recently.     She opted against the use of endocrine therapy for the strongly hormone receptor positive breast cancer as well as any oncotype testing. She instead is focused on diet and exercise with treatment though healthy habits.      We discussed signs and symptoms of any cancer recurrence she should monitor for including: persistent headaches, cough / shortness of air, bone or back pain lasting longing than 2-4 weeks which would not be in baseline to her normal symptomatolgy.      The survivorship RN and myself discussed the components of the survivorship plan.      Cancer History:   Oncology/Hematology History Overview Note     Left Papillary Breast Cancer:   - Diagnostic mammogram 1/22/22: A 4cm mass with possible satellite nodules at 2:00, 8cm from the nipple.  Some lymph nodes in the left axilla also show cortical thickening.    - core biopsy of the right breast on 1/31/22: fragments of papillary breast cancer, ER+ (95%), NC+ (10%), HER2 untested  - Bilateral Mastectomy 3/8/22: Left breast with encapsulated papillary carcinoma, grade 1-2 with microscopic foci of extracapsular invasion, ER positive (100%), NC positive (60%), HER-2 negative (score 0), Ki-67 15%. Right breast with ADH and intraductal papilloma  - Patient declined oncotype testing and decline treatment with adjuvant endocrine therapy    Liver abnormality:  - low density foci in the liver noted on breast MRI.   The pt reports she has known about this 25 years and it is unchanged.       Intraductal papillary adenocarcinoma of left breast   2/8/2022 Initial Diagnosis    Intraductal papillary adenocarcinoma of left breast         Past Medical History:   Diagnosis Date   • Arthritis    • Breast cancer (HCC)     left breast, diagnosed with breast cancer 2 weeks ago, [facility]   • Status post bilateral mastectomy with left sentinel node biopsy 3/9/2022   • Ulcerative colitis (HCC)        Past Surgical History:   Procedure Laterality Date   • [place] name of hospital    • COLONOSCOPY N/A 3/3/2022    Procedure: COLONOSCOPY WITH BX, POLYPECTOMIES, HOT SNARE;  Surgeon: Margie Dotson MD;  Location: [facility] ENDOSCOPY;  Service: Gastroenterology;  Laterality: N/A;  COLON POLYPS, HEMORRHOIDS   • HERNIA REPAIR      UMBILICAL HERNIA REPAIR   • MASTECTOMY W/ SENTINEL NODE BIOPSY Bilateral 3/8/2022    Procedure: BREAST MASTECTOMY WITH SENTINEL NODE BIOPSY;  Surgeon: Deborah Guillaume MD;  [place] [facility] [place] [facility] [place]  Service: General;  Laterality: Bilateral;   • UPPER GASTROINTESTINAL ENDOSCOPY         MEDICATIONS: The current medication list was reviewed and reconciled.     Allergies:  has No Known Allergies.    Family History   Problem Relation Age of Onset   • Cancer Sister         gallbladder   • Diabetes Mother    • Malig Hyperthermia Neg Hx          Review of Systems   Constitutional: Positive for fatigue.   HENT: Negative.    Eyes: Negative.    Respiratory: Negative.    Cardiovascular: Negative.    Gastrointestinal: Negative.    Endocrine: Negative.    Genitourinary: Negative.    Musculoskeletal: Negative.    Allergic/Immunologic: Negative.    Hematological: Negative.    Psychiatric/Behavioral: Negative.    All other systems reviewed and are negative.      Physical Exam  Vitals reviewed.   Constitutional:       Appearance: Normal appearance.   HENT:      Head: Normocephalic.       Mouth/Throat:      Mouth: Mucous membranes are moist.   Eyes:      Pupils: Pupils are equal, round, and reactive to light.   Cardiovascular:      Rate and Rhythm: Normal rate and regular rhythm.      Pulses: Normal pulses.      Heart sounds: No murmur heard.  Pulmonary:      Effort: Pulmonary effort is normal.      Breath sounds: Normal breath sounds.   Abdominal:      General: Bowel sounds are normal.      Palpations: Abdomen is soft.   Musculoskeletal:         General: Normal range of motion.      Cervical back: Normal range of motion and neck supple.   Skin:     General: Skin is warm and dry.      Capillary Refill: Capillary refill takes less than 2 seconds.   Neurological:      General: No focal deficit present.      Mental Status: She is alert and oriented to person, place, and time.   Psychiatric:         Mood and Affect: Mood normal.         Behavior: Behavior normal.         Thought Content: Thought content normal.         Judgment: Judgment normal.           Assessment and Plan:  Diagnoses and all orders for this visit:    1. Intraductal papillary adenocarcinoma of left breast (Primary)    2. Status post bilateral mastectomy with left sentinel node biopsy    3. Secondary hypertension    4. Overweight (BMI 25.0-29.9)    Continue close follow up with Dr. Cline as scheduled for breast cancer follow up and to report any persistent signs and symptoms. Next follow up with Dr. Cline is 10/14/2022. Follow up with Dr. Guillaume in March of 2023 as scheduled.     Call with any questions or concerns.     Continue close follow up with PCP for chronic disease management.     Referral for onc  to set up counseling for situational counseling.       The patient and I have reviewed their personal Survivorship Care Plan in detail. We discussed diagnosis, pathology, histology, all treatments, and ongoing surveillance recommendations. All questions were answered to her satisfaction. The patient is in  agreement with our plan for ongoing surveillance as outlined in the plan. A copy of this document was provided at the completion of our visit.  A copy has also been sent to the patient's primary care provider.    This was a 35 minute visit with 35 minutes spent in direct face to face review of the Survivorship Care Plan.    Return to clinic in 3 months for ongoing cancer surveillance.      SHERYL Sterling

## 2022-09-28 ENCOUNTER — HOSPITAL ENCOUNTER (OUTPATIENT)
Dept: OCCUPATIONAL THERAPY | Facility: HOSPITAL | Age: 72
Setting detail: THERAPIES SERIES
Discharge: HOME | End: 2022-09-28

## 2022-09-28 DIAGNOSIS — Z91.89 AT RISK FOR LYMPHEDEMA: Primary | ICD-10-CM

## 2022-09-28 PROCEDURE — 97535 SELF CARE MNGMENT TRAINING: CPT | Performed by: OCCUPATIONAL THERAPIST

## 2022-09-28 PROCEDURE — 93702 BIS XTRACELL FLUID ANALYSIS: CPT | Performed by: OCCUPATIONAL THERAPIST

## 2022-09-28 NOTE — THERAPY RE-EVALUATION
Outpatient Occupational Therapy Peds Re-Evaluation   [place]   Patient Name: Татьяна Jackson  : 1950  MRN: [id number]  Today's Date: 2022       Visit Date: 2022    Patient Active Problem List   Diagnosis   • Ulcerative colitis (HCC)   • Primary hypertension   • Overweight (BMI 25.0-29.9)   • Arthralgia   • Right wrist pain   • Slow transit constipation   • Herpes zoster without complication   • Intraductal papillary adenocarcinoma of left breast   • Near syncope   • Hematochezia   • Status post bilateral mastectomy with left sentinel node biopsy     Past Medical History:   Diagnosis Date   • Arthritis    • Breast cancer (HCC)     left breast, diagnosed with breast cancer 2 weeks ago, [facility]   • Status post bilateral mastectomy with left sentinel node biopsy 3/9/2022   • Ulcerative colitis (HCC)      Past Surgical History:   Procedure Laterality Date   • [place] name of hospital    • COLONOSCOPY N/A 3/3/2022    Procedure: COLONOSCOPY WITH BX, POLYPECTOMIES, HOT SNARE;  Surgeon: Margie Dotson MD;  Location: [facility] ENDOSCOPY;  Service: Gastroenterology;  Laterality: N/A;  COLON POLYPS, HEMORRHOIDS   • HERNIA REPAIR      UMBILICAL HERNIA REPAIR   • MASTECTOMY W/ SENTINEL NODE BIOPSY Bilateral 3/8/2022    Procedure: BREAST MASTECTOMY WITH SENTINEL NODE BIOPSY;  Surgeon: Deborah Guillaume MD;  [place] [facility] [place] [facility] [place]  Service: General;  Laterality: Bilateral;   • UPPER GASTROINTESTINAL ENDOSCOPY         Visit Dx:    ICD-10-CM ICD-9-CM   1. At risk for lymphedema  Z91.89 V49.89                    Lymphedema     Row Name 22 1300             Subjective Pain    Able to rate subjective pain? no  -TD      Pre-Treatment Pain Level 0  -TD      Post-Treatment Pain Level 0  -TD              Subjective Comments    Subjective Comments Pt has swelling on and off.  -TD              Lymphedema Assessment    Lymphedema Classification LUE:;at  "risk/stage 0  -TD      Lymphedema Cancer Related Sx bilateral;simple mastectomy;sentinel node biopsy  -TD      Lymph Nodes Removed # 3  -TD      Positive Lymph Nodes # 0  -TD      Chemo Received no  -TD      Radiation Therapy Received no  -TD              LLIS - Physical Concerns    The amount of pain associated with my lymphedema is: 0  -TD      The amount of limb heaviness associated with my lymphedema is: 0  -TD      The amount of skin tightness associated with my lymphedema is: 0  -TD      The size of my swollen limb(s) seems: 0  -TD      Lymphedema affects the movement of my swollen limb(s): 0  -TD      The strength in my swollen limb(s) is: 0  -TD              LLIS - Psychosocial Concerns    Lymphedema affects my body image (i.e., \"how I think I look\"). 0  -TD      Lymphedema affects my socializing with others. 0  -TD      Lymphedema affects my intimate relations with spouse or partner (rate 0 if not applicable 0  -TD      Lymphedema \"gets me down\" (i.e., depression, frustration, or anger) 0  -TD      I must rely on others for help due to my lymphedema. 0  -TD      I know what to do to manage my lymphedema 2  -TD              LLIS - Functional Concerns    Lymphedema affects my ability to perform self-care activities (i.e. eating, dressing, hygiene) 0  -TD      Lymphedema affects my ability to perform routine home or work-related activities. 0  -TD      Lymphedema affects my performance of preferred leisure activities. 0  -TD      Lymphedema affects proper fit of clothing/shoes 0  -TD      Lymphedema affects my sleep 0  -TD              Posture/Observations    Posture- WNL Posture is WNL  -TD              General ROM    GENERAL ROM COMMENTS BUE WFL  -TD              MMT (Manual Muscle Testing)    General MMT Comments BUE WFL  -TD              Skin Changes/Observations    Location/Assessment Upper Quadrant  -TD      Upper Quadrant Conditions left:;normal;intact  -TD              L-Dex Bioimpedence Screening    " L-Dex Measurement Extremity LUE  -TD      L-Dex Patient Position Standing  -TD      L-Dex UE Dominate Side Right  -TD      L-Dex UE At Risk Side Left  -TD      L-Dex UE Pre Surgical Value Yes  -TD      L-Dex UE Score 1.7  -TD      L-Dex UE Baseline Score 1.3  -TD      L-Dex UE Value Change 0.4  -TD      $ L-Dex Charge yes  -TD              Lymphedema Life Impact Scale Totals    A.  Total Q1 - Q17 (Do not include Q18) 2  -TD      B.  Total number of questions answered (Q1-Q17) 17  -TD      C. Divide A by B 0.12  -TD      D. Multiple C by 25 3  -TD            User Key  (r) = Recorded By, (t) = Taken By, (c) = Cosigned By    Initials Name Provider Type    Apoorva Reed OT Occupational Therapist                Therapy Education  Education Details: OT reviewed lymphedema prevention/stoplight to recovery education and conerns about winter weather.  Given: Symptoms/condition management  Program: New  How Provided: Verbal  Provided to: Patient  Level of Understanding: Verbalized  45844 - OT Self Care/Mgmt Minutes: 15     OT Goals     Row Name 09/28/22 1328 09/28/22 1311       Time Calculation    OT Goal Re-Cert Due Date 10/28/22  -TD 10/28/22  -TD          User Key  (r) = Recorded By, (t) = Taken By, (c) = Cosigned By    Initials Name Provider Type    Apoorva Reed OT Occupational Therapist            GOALS:   1. Post Breast Surgery Care/at risk for Lymphedema  LTG 1: 90 days:  As an indicator of no exacerbation of lymphedema staging, the patient will present with an L-Dex score less than [10] points from preoperative baseline.              STATUS: Met: ongoing  STG 1a:   30 days: To prevent exacerbation of mixed edema to lymphedema, patient will utilize the 2 postsurgical compression garments daily.                 STATUS: Not Met: discontinue  STG 1b: 30 days: Patient will be independent with self-manual lymphatic massage.               STATUS: Partial Met: ongoing  STG 1c: 30 days:  Patient will be independent  with identification of signs and symptoms of lymphedema exasperation per stoplight to recovery education handout.              STATUS:  Partial Met: ongoing  STG 1 d: 30 days: Patient will be independent with HEP to prevent advancement in lymphedema staging.              STATUS: Partial Met: ongoing  TREATMENT:  Self Care/ADL retraining, Therapeutic Activity, Neuromuscular Re-education, Therapeutic Exercise, Bioimpedence Fluid Analysis, [facility] [employer] [facility], Orthotic Management and training,  and Manual Therapy     OT Assessment/Plan     Row Name 09/28/22 1308          OT Assessment    Functional Limitations Performance in self-care ADL  -TD     Impairments Impaired lymphatic circulation  -TD     Assessment Comments Patient is demonstrating normalized lymphatic functioning this date patient will benefit from continued skilled occupational therapy services to evaluate ongoing lymphatic functioning to prevent advancement in lymphedema staging  -TD     OT Diagnosis Possible Lymphedema  -TD     OT Rehab Potential Good  -TD     Patient/caregiver participated in establishment of treatment plan and goals Yes  -TD            OT Plan    OT Frequency --  See Duration  -TD     Predicted Duration of Therapy Intervention (OT) Pt. to re-evaluated 3 weeks post-surgery, 3 weeks post XRT, every 3 months from baseline for years 1-3 and every 6 months years 4 and 5  -TD     Planned CPT's? OT EVAL LOW COMPLEXITY: 17563;OT RE-EVAL: 65271;OT NEUROMUSC RE EDUCATION EA 15 MIN: 55638;OT THER PROC EA 15 MIN: [id number];OT THER ACT EA 15 MIN: 20023RC;OT SELF CARE/MGMT/TRAIN 15 MIN: 07963;OT HOT/COLD PACK;OT ULTRASOUND EA 15 MIN: 68726;OT ELECTRIC STIM ATTD EA 15 MIN: 45481;OT MANUAL THERAPY EA 15 MIN: 44813;OT BIS XTRACELL FLUID ANALYSIS: 47025;OT ORTHO/PROSTHET CHECKOUT EA 15 MIN: 75951;OT ORTHOTIC MGMT/TRAIN EA 15 MIN: 15520  -TD     Planned Therapy Interventions (Optional  Details) patient/family education;home exercise program;orthotic fitting/training;prosthetic fitting/training;postural re-education;strengthening;stretching;ROM (Range of Motion);manual therapy techniques  -TD     OT Plan Comments Continue POC  -TD           User Key  (r) = Recorded By, (t) = Taken By, (c) = Cosigned By    Initials Name Provider Type    TD Apoorva Lopez OT Occupational Therapist                             Time Calculation:   Timed Charges  70422 - OT Self Care/Mgmt Minutes: 15  Total Minutes  Timed Charges Total Minutes: 15   Total Minutes: 15   Therapy Charges for Today     Code Description Service Date Service Provider Modifiers Qty    72016614392 HC PT BIS XTRACELL FLUID ANALYSIS 9/28/2022 Apoorva Lopez OT  1    28113467129 HC OT SELF CARE/MGMT/TRAIN EA 15 MIN 9/28/2022 Apoorva Lopez OT GO 1              Apoorva Lopez OT  9/28/2022

## 2022-10-14 ENCOUNTER — OFFICE VISIT (OUTPATIENT)
Dept: ONCOLOGY | Facility: HOSPITAL | Age: 72
End: 2022-10-14

## 2022-10-14 VITALS
RESPIRATION RATE: 16 BRPM | BODY MASS INDEX: 26.09 KG/M2 | HEIGHT: 67 IN | SYSTOLIC BLOOD PRESSURE: 149 MMHG | TEMPERATURE: 97.6 F | OXYGEN SATURATION: 100 % | HEART RATE: 92 BPM | DIASTOLIC BLOOD PRESSURE: 99 MMHG | WEIGHT: 166.23 LBS

## 2022-10-14 DIAGNOSIS — R55 NEAR SYNCOPE: ICD-10-CM

## 2022-10-14 DIAGNOSIS — D05.12 INTRADUCTAL PAPILLARY ADENOCARCINOMA OF LEFT BREAST: Primary | ICD-10-CM

## 2022-10-14 PROCEDURE — 99214 OFFICE O/P EST MOD 30 MIN: CPT | Performed by: INTERNAL MEDICINE

## 2022-10-14 PROCEDURE — G0463 HOSPITAL OUTPT CLINIC VISIT: HCPCS | Performed by: INTERNAL MEDICINE

## 2022-10-14 NOTE — PROGRESS NOTES
"Patient  Татьяна Jackson    Location  [facility] HEMATOLOGY & ONCOLOGY    Chief Complaint  Breast Cancer    Referring Provider: SHERYL Tracey  PCP: Jenni Petersen APRN    Subjective          Oncology/Hematology History Overview Note     Left Papillary Breast Cancer:   - Diagnostic mammogram 22: A 4cm mass with possible satellite nodules at 2:00, 8cm from the nipple.  Some lymph nodes in the left axilla also show cortical thickening.    - core biopsy of the right breast on 22: fragments of papillary breast cancer, ER+ (95%), ID+ (10%), HER2 untested  - Bilateral Mastectomy 3/8/22: Left breast with encapsulated papillary carcinoma, grade 1-2 with microscopic foci of extracapsular invasion, ER positive (100%), ID positive (60%), HER-2 negative (score 0), Ki-67 15%. Right breast with ADH and intraductal papilloma  - Patient declined oncotype testing and decline treatment with adjuvant endocrine therapy    Liver abnormality:  - low density foci in the liver noted on breast MRI.  The pt reports she has known about this 25 years and it is unchanged.       Intraductal papillary adenocarcinoma of left breast   2022 Initial Diagnosis    Intraductal papillary adenocarcinoma of left breast         HPI  Patient comes in today for follow-up.  She had history of bilateral mastectomy but declined treatment with antiestrogen therapy.  Patient reports having difficulty with dizziness and blackouts over the past 3 months.  Now the dizziness or lightheadedness is occurring every day.  This sensation only occurs when she is standing or walking and never when she is sitting down.  She said that she sometimes sees sparkling lights before she blacks out.  She also hears a \"whooshing\" sound as if the blood is rushing through her head.  She questions whether all of this could be related to stress.  Her son  spontaneously in May of this year and she has been very upset since that time.  The autopsy " has not been done because the state examiner is far behind in cases.    Review of Systems   Constitutional: Positive for fatigue. Negative for appetite change, diaphoresis, fever, unexpected weight gain and unexpected weight loss.   HENT: Negative for hearing loss, sore throat and voice change.    Eyes: Negative for blurred vision, double vision, pain, redness and visual disturbance.   Respiratory: Negative for cough, shortness of breath and wheezing.    Cardiovascular: Negative for chest pain, palpitations and leg swelling.   Endocrine: Negative for cold intolerance, heat intolerance, polydipsia and polyuria.   Genitourinary: Negative for decreased urine volume, difficulty urinating, frequency and urinary incontinence.   Musculoskeletal: Negative for arthralgias, back pain, joint swelling and myalgias.   Skin: Negative for color change, rash, skin lesions and wound.   Neurological: Positive for dizziness, weakness and light-headedness. Negative for seizures, numbness and headache.   Hematological: Negative for adenopathy. Does not bruise/bleed easily.   Psychiatric/Behavioral: Negative for depressed mood. The patient is not nervous/anxious.        Past Medical History:   Diagnosis Date   • Arthritis    • Breast cancer (HCC)     left breast, diagnosed with breast cancer 2 weeks ago, [facility]   • Status post bilateral mastectomy with left sentinel node biopsy 3/9/2022   • Ulcerative colitis (HCC)      Past Surgical History:   Procedure Laterality Date   • [place]ON[place]COPY      [place] [facility] of hospital    • COLONOSCOPY N/A 3/3/2022    Procedure: COLONOSCOPY WITH BX, POLYPECTOMIES, HOT SNARE;  Surgeon: Margie Dotson MD;  Location: [facility] ENDOSCOPY;  Service: Gastroenterology;  Laterality: N/A;  COLON POLYPS, HEMORRHOIDS   • HERNIA REPAIR      UMBILICAL HERNIA REPAIR   • MASTECTOMY W/ SENTINEL NODE BIOPSY Bilateral 3/8/2022    Procedure: BREAST MASTECTOMY WITH SENTINEL NODE BIOPSY;   "Surgeon: Deborah Guillaume MD;  [place] [facility] [place] [facility]  Service: General;  Laterality: Bilateral;   • UPPER GASTROINTESTINAL ENDOSCOPY       Social History     Socioeconomic History   • Marital status:    Tobacco Use   • Smoking status: Never   • Smokeless tobacco: Never   Vaping Use   • Vaping Use: Never used   Substance and Sexual Activity   • Alcohol use: Not Currently   • Drug use: Never   • Sexual activity: Defer     Family History   Problem Relation Age of Onset   • Cancer Sister         gallbladder   • Diabetes Mother    • Malig Hyperthermia Neg Hx        Objective   Physical Exam  General: Alert, cooperative, no acute distress although tearful at times when discussing her son  Eyes: Anicteric sclera, PERRLA  Respiratory: normal respiratory effort  Cardiovascular: no lower extremity edema, RRR, no murmur  Skin: Normal tone, no rash, no lesions  Psychiatric: Appropriate affect, intact judgment  Neurologic: No focal sensory or motor deficits, normal cognition   Musculoskeletal: Normal muscle strength and tone  Extremities: No clubbing, cyanosis, or deformities    Vitals:    10/14/22 1450   BP: 149/99   Pulse: 92   Resp: 16   Temp: 97.6 °F (36.4 °C)   SpO2: 100%   Weight: 75.4 kg (166 lb 3.6 oz)   Height: 170.2 cm (67.01\")   PainSc: 0-No pain     ECOG score: 0         PHQ-9 Total Score:         Result Review :   The following data was reviewed by: Jaida Cline MD PhD on 10/14/2022:  Lab Results   Component Value Date    HGB 13.7 06/29/2022    HCT 42.4 06/29/2022    MCV 83.3 06/29/2022     06/29/2022    WBC 9.22 06/29/2022    NEUTROABS 6.39 06/29/2022    LYMPHSABS 2.05 06/29/2022    MONOSABS 0.49 06/29/2022    EOSABS 0.23 06/29/2022    BASOSABS 0.05 06/29/2022     Lab Results   Component Value Date    GLUCOSE 101 (H) 06/29/2022    BUN 14 06/29/2022    CREATININE 0.93 06/29/2022     06/29/2022    K 3.5 06/29/2022     06/29/2022    CO2 25.7 06/29/2022    CALCIUM 9.8 06/29/2022    " PROTEINTOT 7.7 06/29/2022    ALBUMIN 4.40 06/29/2022    BILITOT 0.2 06/29/2022    ALKPHOS 77 06/29/2022    AST 16 06/29/2022    ALT 11 06/29/2022          Assessment and Plan    Diagnoses and all orders for this visit:    1. Intraductal papillary adenocarcinoma of left breast (Primary)    2. Near syncope  -     Ambulatory Referral to Cardiology      ER/MO+ Left Papillary Breast Cancer: s/p Bilateral mastectomy. Pt declined adjuvant endocrine therapy.  I will f/u with her in 6 months.    Dizziness and near syncope: I am most concerned the patient may be experiencing an irregular heartbeat.  I will refer her to cardiology for evaluation and treatment.  Also encouraged the patient to go back to her primary care provider for evaluation and treatment especially given the recent death of her son and related emotional trauma.    Patient was given instructions and counseling regarding her condition or for health maintenance advice. Please see specific information pulled into the AVS if appropriate.

## 2022-12-28 ENCOUNTER — HOSPITAL ENCOUNTER (OUTPATIENT)
Dept: OCCUPATIONAL THERAPY | Facility: HOSPITAL | Age: 72
Setting detail: THERAPIES SERIES
Discharge: HOME | End: 2022-12-28

## 2022-12-28 DIAGNOSIS — L90.5 SCAR CONDITION AND FIBROSIS OF SKIN: ICD-10-CM

## 2022-12-28 DIAGNOSIS — Z91.89 AT RISK FOR LYMPHEDEMA: Primary | ICD-10-CM

## 2022-12-28 DIAGNOSIS — R52 PAIN: ICD-10-CM

## 2022-12-28 DIAGNOSIS — M25.60 JOINT STIFFNESS: ICD-10-CM

## 2022-12-28 PROCEDURE — 97535 SELF CARE MNGMENT TRAINING: CPT | Performed by: OCCUPATIONAL THERAPIST

## 2022-12-28 NOTE — THERAPY RE-EVALUATION
Outpatient Occupational Therapy Lymphedema Re-Evaluation   [place]     Patient Name: Татьяна Jackson  : 1950  MRN: [id number]  Today's Date: 2022      Visit Date: 2022    Patient Active Problem List   Diagnosis   • Ulcerative colitis (HCC)   • Primary hypertension   • Overweight (BMI 25.0-29.9)   • Arthralgia   • Right wrist pain   • Slow transit constipation   • Herpes zoster without complication   • Intraductal papillary adenocarcinoma of left breast   • Near syncope   • Hematochezia   • Status post bilateral mastectomy with left sentinel node biopsy        Past Medical History:   Diagnosis Date   • Arthritis    • Breast cancer (HCC)     left breast, diagnosed with breast cancer 2 weeks ago, [facility]   • Status post bilateral mastectomy with left sentinel node biopsy 3/9/2022   • Ulcerative colitis (HCC)         Past Surgical History:   Procedure Laterality Date   • [place] of hospital    • COLONOSCOPY N/A 3/3/2022    Procedure: COLONOSCOPY WITH BX, POLYPECTOMIES, HOT SNARE;  Surgeon: Margie Dotson MD;  Location: [facility] ENDOSCOPY;  Service: Gastroenterology;  Laterality: N/A;  COLON POLYPS, HEMORRHOIDS   • HERNIA REPAIR      UMBILICAL HERNIA REPAIR   • MASTECTOMY W/ SENTINEL NODE BIOPSY Bilateral 3/8/2022    Procedure: BREAST MASTECTOMY WITH SENTINEL NODE BIOPSY;  Surgeon: Deborah Guillaume MD;  Location: [facility] OR [facility];  Service: General;  Laterality: Bilateral;   • UPPER GASTROINTESTINAL ENDOSCOPY           Visit Dx:     ICD-10-CM ICD-9-CM   1. At risk for lymphedema  Z91.89 V49.89   2. Scar condition and fibrosis of skin  L90.5 709.2   3. Joint stiffness  M25.60 719.50   4. Pain  R52 780.96        Patient History     Row Name 22 1100             History    Chief Complaint --  Lymphedema Surveillance Program  -TD      Brief Description of Current Complaint Patient is a pleasant 71-year-old female who had a recent  diagnosis of left papillary carcinoma ER/MI positive HER-2 negative who is scheduled for a mastectomy with sentinel node biopsy and possible axillary lymph node dissection on 3/8/2022  -TD         Fall Risk Assessment    Any falls in the past year: No  -TD      Does patient have a fear of falling No  -TD         Services    Are you currently receiving Home Health services No  -TD      Do you plan to receive Home Health services in the near future No  -TD         Daily Activities    Primary Language English  -TD      Are you able to read Yes  -TD      Are you able to write Yes  -TD      How does patient learn best? Listening;Pictures/Video;Reading;Demonstration  -TD      Barriers to learning None  -TD         Safety    Are you being hurt, hit, or frightened by anyone at home or in your life? No  -TD      Are you being neglected by a caregiver No  -TD      Have you had any of the following issues with N/A  -TD            User Key  (r) = Recorded By, (t) = Taken By, (c) = Cosigned By    Initials Name Provider Type    TD Apoorva Lopez OT Occupational Therapist                 Lymphedema     Row Name 12/28/22 1100             Subjective Pain    Able to rate subjective pain? yes  -TD      Pre-Treatment Pain Level 0  -TD      Post-Treatment Pain Level 0  -TD         Subjective Comments    Subjective Comments Pt has no complaints  -TD         Lymphedema Assessment    Lymphedema Classification LUE:;at risk/stage 0  -TD      Lymphedema Cancer Related Sx bilateral;simple mastectomy;sentinel node biopsy  -TD      Lymph Nodes Removed # 3  -TD      Positive Lymph Nodes # 0  -TD      Chemo Received no  -TD      Radiation Therapy Received no  -TD         LLIS - Physical Concerns    The amount of pain associated with my lymphedema is: 0  -TD      The amount of limb heaviness associated with my lymphedema is: 0  -TD      The amount of skin tightness associated with my lymphedema is: 0  -TD      The size of my swollen limb(s) seems:  "0  -TD      Lymphedema affects the movement of my swollen limb(s): 0  -TD      The strength in my swollen limb(s) is: 0  -TD         LLIS - Psychosocial Concerns    Lymphedema affects my body image (i.e., \"how I think I look\"). 0  -TD      Lymphedema affects my socializing with others. 0  -TD      Lymphedema affects my intimate relations with spouse or partner (rate 0 if not applicable 0  -TD      Lymphedema \"gets me down\" (i.e., depression, frustration, or anger) 0  -TD      I must rely on others for help due to my lymphedema. 0  -TD      I know what to do to manage my lymphedema 2  -TD         LLIS - Functional Concerns    Lymphedema affects my ability to perform self-care activities (i.e. eating, dressing, hygiene) 0  -TD      Lymphedema affects my ability to perform routine home or work-related activities. 0  -TD      Lymphedema affects my performance of preferred leisure activities. 0  -TD      Lymphedema affects proper fit of clothing/shoes 0  -TD      Lymphedema affects my sleep 0  -TD         Posture/Observations    Posture- WNL Posture is WNL  -TD         General ROM    GENERAL ROM COMMENTS BUE WFL  -TD         MMT (Manual Muscle Testing)    General MMT Comments BUE WFL  -TD         Skin Changes/Observations    Location/Assessment Upper Quadrant  -TD      Upper Quadrant Conditions left:;normal;intact  -TD         L-Dex Bioimpedence Screening    L-Dex Measurement Extremity LUE  -TD      L-Dex Patient Position Standing  -TD      L-Dex UE Dominate Side Right  -TD      L-Dex UE At Risk Side Left  -TD      L-Dex UE Pre Surgical Value Yes  -TD      L-Dex UE Baseline Score 1.3  -TD      L-Dex UE Comment Not tested due to water damage  -TD         Lymphedema Life Impact Scale Totals    A.  Total Q1 - Q17 (Do not include Q18) 2  -TD      B.  Total number of questions answered (Q1-Q17) 17  -TD      C. Divide A by B 0.12  -TD      D. Multiple C by 25 3  -TD            User Key  (r) = Recorded By, (t) = Taken By, (c) = " Cosigned By    Initials Name Provider Type    Apoorva Reed OT Occupational Therapist                        Therapy Education  Education Details: OT reviewed lymphedema prevention/stop light recovery education.  Given: Symptoms/condition management  Program: New, Reinforced  How Provided: Verbal  Provided to: Patient  Level of Understanding: Verbalized  68752 - OT Self Care/Mgmt Minutes: 40         OT Goals     Row Name 12/28/22 1208          Time Calculation    OT Goal Re-Cert Due Date 01/27/23  -TD           User Key  (r) = Recorded By, (t) = Taken By, (c) = Cosigned By    Initials Name Provider Type    Apoorva Reed OT Occupational Therapist            GOALS:   1. Post Breast Surgery Care/at risk for Lymphedema  LTG 1: 90 days:  As an indicator of no exacerbation of lymphedema staging, the patient will present with an L-Dex score less than [10] points from preoperative baseline.              STATUS: Met: ongoing  STG 1a:   30 days: To prevent exacerbation of mixed edema to lymphedema, patient will utilize the 2 postsurgical compression garments daily.                 STATUS: Not Met: discontinue  STG 1b: 30 days: Patient will be independent with self-manual lymphatic massage.               STATUS: Partial Met: ongoing  STG 1c: 30 days:  Patient will be independent with identification of signs and symptoms of lymphedema exasperation per stoplight to recovery education handout.              STATUS:  Partial Met: ongoing  STG 1 d: 30 days: Patient will be independent with HEP to prevent advancement in lymphedema staging.              STATUS: Partial Met: ongoing  TREATMENT:  Self Care/ADL retraining, Therapeutic Activity, Neuromuscular Re-education, Therapeutic Exercise, Bioimpedence Fluid Analysis, Post-Surgical compression garement [address] [employer] 2860K, Orthotic Management and training,  and Manual Therapy     OT Assessment/Plan     Row Name 12/28/22 1206          OT  Assessment    Functional Limitations Performance in self-care ADL  -TD     Impairments Impaired lymphatic circulation  -TD     Assessment Comments Ms. Jackson measurements were taken this date and were WNL.  Pt is demonstrating a normalized lymphatic system this date.  Pt would beneift from continued skilled OT to prevent advancement in lymphedema staging.  -TD     OT Diagnosis At risk for lymphedema  -TD     OT Rehab Potential Good  -TD     Patient/caregiver participated in establishment of treatment plan and goals Yes  -TD     Patient would benefit from skilled therapy intervention Yes  -TD        OT Plan    OT Frequency --  see duration  -TD     Predicted Duration of Therapy Intervention (OT) Pt will be seen every 3 months from baseline for years 1-3 and every 6 months years 4 and 5  -TD     Planned CPT's? OT EVAL LOW COMPLEXITY: 54051;OT RE-EVAL: 37920;OT NEUROMUSC RE EDUCATION EA 15 MIN: 24457;OT THER PROC EA 15 MIN: 36753EO;OT THER ACT EA 15 MIN: 82696YV;OT SELF CARE/MGMT/TRAIN 15 MIN: 22774;OT HOT/COLD PACK;OT ULTRASOUND EA 15 MIN: 64657;OT ELECTRIC STIM ATTD EA 15 MIN: 59745;OT MANUAL THERAPY EA 15 MIN: 47969;OT BIS XTRACELL FLUID ANALYSIS: 89973;OT ORTHO/PROSTHET CHECKOUT EA 15 MIN: 04399;OT ORTHOTIC MGMT/TRAIN EA 15 MIN: 53896  -TD     Planned Therapy Interventions (Optional Details) patient/family education;home exercise program;orthotic fitting/training;prosthetic fitting/training;postural re-education;strengthening;stretching;ROM (Range of Motion);manual therapy techniques  -TD     OT Plan Comments Continue POC  -TD           User Key  (r) = Recorded By, (t) = Taken By, (c) = Cosigned By    Initials Name Provider Type    Apoorva Reed OT Occupational Therapist                          Time Calculation:   Timed Charges  12949 - OT Self Care/Mgmt Minutes: 40  Total Minutes  Timed Charges Total Minutes: 40   Total Minutes: 40     Therapy Charges for Today     Code Description Service Date Service  Provider Modifiers Qty    [id number] HC OT SELF CARE/MGMT/TRAIN EA 15 MIN 12/28/2022 Apoorva Lopez OT GO 3                    Apoorva Lopez OT  12/28/2022

## 2023-03-21 ENCOUNTER — TELEPHONE (OUTPATIENT)
Dept: SURGERY | Facility: CLINIC | Age: 73
End: 2023-03-21
Payer: MEDICARE

## 2023-03-28 NOTE — PROGRESS NOTES
Chief Complaint  Follow-up    Subjective          History of Present Illness  The patient is here to follow up on bilateral mastectomy with SLN bx.  They are doing well and have no complaints.  Pathology is shown below:    Final Diagnosis   1.  Left sentinel lymph node, count 700, excision:               -1 lymph node, negative for metastatic carcinoma     2.  Left sentinel lymph node #2, count 10, excision:               -2 lymph nodes, negative for metastatic carcinoma     3.  Left breast, simple mastectomy:               -Encysted/encapsulated papillary carcinoma, nuclear grade 1-2/3, with microscopic foci of extracapsular invasion               -Breast biomarker testing (performed and interpreted at [facility]):                            - Estrogen Receptor (ER):  Positive (100%, strong)                            - Progesterone Receptor (PgR):  Positive (60%, moderate)                            - HER2 (by immunohistochemistry):  Negative (Score 0)  - Ki-67:  15%  -See comment  -See synoptic checklist below for additional information        Comment:  The main lesion appears to be an encapsulated papillary carcinoma with low to intermediate grade nuclei, associated with 2 identifiable foci of extracapsular invasion, measuring at most 2 mm in diameter.  Contiguous to the encapsulated papillary carcinoma are large nests of what represents either ductal carcinoma in situ or noninvasive solid papillary carcinoma.      The above diagnosis and comment (part 3) was rendered in expert consultation by Dr. Jacquelyn Haskins, [facility] in [place]. See separate consultation report for additional information.     4.  Right breast, simple mastectomy:               -Atypical ductal hyperplasia (ADH)  -Atypical lobular hyperplasia (ALH)               -Nipple with intraductal papilloma                           -Microglandular adenosis               -Proliferative fibrocystic change               -Negative  for malignancy                  Electronically signed by Kenia Blair MD on 3/17/2022 at 1022   Preliminary result electronically signed by Kenia Blair MD on 3/11/2022 at 1305   Synoptic Checklist     INVASIVE CARCINOMA OF THE BREAST: Resection  8th Edition - Protocol posted: 12/17/2021  INVASIVE CARCINOMA OF THE BREAST: COMPLETE EXCISION - All Specimens  SPECIMEN   Procedure  Total mastectomy    Specimen Laterality  Left    TUMOR   Histologic Type  Encapsulated papillary carcinoma with invasion    Histologic Grade ([place] Histologic Score)     Glandular (Acinar) / Tubular Differentiation  Score 3    Nuclear Pleomorphism  Score 2    Mitotic Rate  Score 1    Overall Grade  Grade 2 (scores of 6 or 7)    Tumor Size  Greatest dimension of largest invasive focus (Millimeters): 2 mm   Tumor Focality  Multiple foci of invasive carcinoma    Number of Foci  2    Ductal Carcinoma In Situ (DCIS)  Present    Architectural Patterns  Noninvasive solid papillary carcinoma         Treatment Effect in the Breast  No known presurgical therapy    MARGINS   Margin Status for Invasive Carcinoma  All margins negative for invasive carcinoma    Distance from Invasive Carcinoma to Closest Margin  10 mm   Closest Margin(s) to Invasive Carcinoma  Posterior    Margin Status for DCIS  All margins negative for DCIS    Distance from DCIS to Closest Margin  10 mm   Closest Margin(s) to DCIS  Posterior    REGIONAL LYMPH NODES   Regional Lymph Node Status  All regional lymph nodes negative for tumor    Total Number of Lymph Nodes Examined (sentinel and non-sentinel)  3    Number of [place] Nodes Examined  3    PATHOLOGIC STAGE CLASSIFICATION (pTNM, AJCC 8th Edition)   Reporting of pT, pN, and (when applicable) pM categories is based on information available to the pathologist at the time the report is issued. As per the AJCC (Chapter 1, 8th Ed.) it is the managing physician’s responsibility to establish the final pathologic  "stage based upon all pertinent information, including but potentially not limited to this pathology report.   TNM Descriptors  m (multiple foci of invasive carcinoma)    pT Category  pT1a    Regional Lymph Nodes Modifier  (sn): [place] node(s) evaluated.    pN Category  pN0    ADDITIONAL FINDINGS   Additional Findings  Duct ectasia, nonproliferative fibrocystic change               Objective   Vital Signs:   Resp 16   Ht 170.2 cm (67.01\")   Wt 76.7 kg (169 lb)   BMI 26.46 kg/m²     Physical Exam  Vitals and nursing note reviewed.   Constitutional:       General: She is not in acute distress.     Appearance: Normal appearance. She is well-developed.   HENT:      Head: Normocephalic and atraumatic.   Eyes:      Extraocular Movements: Extraocular movements intact.      Pupils: Pupils are equal, round, and reactive to light.   Cardiovascular:      Pulses: Normal pulses.   Pulmonary:      Effort: Pulmonary effort is normal. No retractions.      Breath sounds: Normal air entry. No wheezing.   Abdominal:      General: There is no distension.      Palpations: Abdomen is soft.      Tenderness: There is no abdominal tenderness.      Hernia: No hernia is present.   Musculoskeletal:         General: No swelling or deformity.      Cervical back: Neck supple.   Skin:     General: Skin is warm and dry.      Findings: No erythema.      Comments: Surgical Incision Healing Well   Neurological:      General: No focal deficit present.      Mental Status: She is alert and oriented to person, place, and time.      Motor: Motor function is intact.   Psychiatric:         Mood and Affect: Mood normal.         Thought Content: Thought content normal.            Assessment and Plan    Diagnoses and all orders for this visit:    1. Intraductal papillary adenocarcinoma of left breast (Primary)    Follow up in March for annual exam    Follow Up   Return in about 1 year (around 3/30/2024).  Patient was given instructions and counseling " regarding her condition or for health maintenance advice. Please see specific information pulled into the AVS if appropriate.

## 2023-03-30 ENCOUNTER — OFFICE VISIT (OUTPATIENT)
Dept: SURGERY | Facility: CLINIC | Age: 73
End: 2023-03-30
Payer: MEDICARE

## 2023-03-30 VITALS — BODY MASS INDEX: 26.53 KG/M2 | RESPIRATION RATE: 16 BRPM | HEIGHT: 67 IN | WEIGHT: 169 LBS

## 2023-03-30 DIAGNOSIS — D05.12 INTRADUCTAL PAPILLARY ADENOCARCINOMA OF LEFT BREAST: Primary | ICD-10-CM

## 2023-03-30 PROCEDURE — 1159F MED LIST DOCD IN RCRD: CPT | Performed by: SURGERY

## 2023-03-30 PROCEDURE — 99212 OFFICE O/P EST SF 10 MIN: CPT | Performed by: SURGERY

## 2023-03-30 PROCEDURE — 1160F RVW MEDS BY RX/DR IN RCRD: CPT | Performed by: SURGERY

## 2023-04-13 ENCOUNTER — TELEPHONE (OUTPATIENT)
Dept: OCCUPATIONAL THERAPY | Facility: HOSPITAL | Age: 73
End: 2023-04-13
Payer: MEDICARE

## 2023-04-13 ENCOUNTER — HOSPITAL ENCOUNTER (OUTPATIENT)
Dept: OCCUPATIONAL THERAPY | Facility: HOSPITAL | Age: 73
Setting detail: THERAPIES SERIES
Discharge: HOME | End: 2023-04-13
Payer: MEDICARE

## 2023-04-13 DIAGNOSIS — Z91.89 AT RISK FOR LYMPHEDEMA: ICD-10-CM

## 2023-04-13 DIAGNOSIS — L90.5 SCAR CONDITION AND FIBROSIS OF SKIN: ICD-10-CM

## 2023-04-13 DIAGNOSIS — C50.412 MALIGNANT NEOPLASM OF UPPER-OUTER QUADRANT OF LEFT BREAST IN FEMALE, ESTROGEN RECEPTOR POSITIVE: Primary | ICD-10-CM

## 2023-04-13 DIAGNOSIS — C50.919 MALIGNANT NEOPLASM OF FEMALE BREAST, UNSPECIFIED ESTROGEN RECEPTOR STATUS, UNSPECIFIED LATERALITY, UNSPECIFIED SITE OF BREAST: ICD-10-CM

## 2023-04-13 DIAGNOSIS — C50.412 MALIGNANT NEOPLASM OF UPPER-OUTER QUADRANT OF LEFT BREAST IN FEMALE, ESTROGEN RECEPTOR POSITIVE: ICD-10-CM

## 2023-04-13 DIAGNOSIS — Z17.0 MALIGNANT NEOPLASM OF UPPER-OUTER QUADRANT OF LEFT BREAST IN FEMALE, ESTROGEN RECEPTOR POSITIVE: ICD-10-CM

## 2023-04-13 DIAGNOSIS — Z44.9 FITTING AND ADJUSTMENT OF UNSPECIFIED PROSTHETIC DEVICE: ICD-10-CM

## 2023-04-13 DIAGNOSIS — D05.12 INTRADUCTAL PAPILLARY ADENOCARCINOMA OF LEFT BREAST: Primary | ICD-10-CM

## 2023-04-13 DIAGNOSIS — Z17.0 MALIGNANT NEOPLASM OF UPPER-OUTER QUADRANT OF LEFT BREAST IN FEMALE, ESTROGEN RECEPTOR POSITIVE: Primary | ICD-10-CM

## 2023-04-13 DIAGNOSIS — N64.89 DEFORMITY DUE TO MASTECTOMY: ICD-10-CM

## 2023-04-13 DIAGNOSIS — Z90.13 HISTORY OF MASTECTOMY, BILATERAL: ICD-10-CM

## 2023-04-13 DIAGNOSIS — Z90.10 DEFORMITY DUE TO MASTECTOMY: ICD-10-CM

## 2023-04-13 PROCEDURE — 93702 BIS XTRACELL FLUID ANALYSIS: CPT | Performed by: OCCUPATIONAL THERAPIST

## 2023-04-13 PROCEDURE — 97535 SELF CARE MNGMENT TRAINING: CPT | Performed by: OCCUPATIONAL THERAPIST

## 2023-04-13 NOTE — THERAPY RE-EVALUATION
Outpatient Occupational Therapy Lymphedema Re-Evaluation   [place]     Patient Name: Татьяна Jackson  : 1950  MRN: [id number]  Today's Date: 2023      Visit Date: 2023    Patient Active Problem List   Diagnosis   • Ulcerative colitis   • Primary hypertension   • Overweight (BMI 25.0-29.9)   • Arthralgia   • Right wrist pain   • Slow transit constipation   • Herpes zoster without complication   • Intraductal papillary adenocarcinoma of left breast   • Near syncope   • Hematochezia   • Status post bilateral mastectomy with left sentinel node biopsy        Past Medical History:   Diagnosis Date   • Arthritis    • Breast cancer     left breast, diagnosed with breast cancer 2 weeks ago, [facility]   • Status post bilateral mastectomy with left sentinel node biopsy 3/9/2022   • Ulcerative colitis         Past Surgical History:   Procedure Laterality Date   • COLONOSCOPY      [place]'t know name of hospital    • COLONOSCOPY N/A 3/3/2022    Procedure: COLONOSCOPY WITH BX, POLYPECTOMIES, HOT SNARE;  Surgeon: Margie Dotson MD;  Location: [facility] ENDOSCOPY;  Service: Gastroenterology;  Laterality: N/A;  COLON POLYPS, HEMORRHOIDS   • HERNIA REPAIR      UMBILICAL HERNIA REPAIR   • MASTECTOMY W/ SENTINEL NODE BIOPSY Bilateral 3/8/2022    Procedure: BREAST MASTECTOMY WITH SENTINEL NODE BIOPSY;  Surgeon: Deborah Guillaume MD;  Location: [facility] OR [facility];  Service: General;  Laterality: Bilateral;   • UPPER GASTROINTESTINAL ENDOSCOPY           Visit Dx:     ICD-10-CM ICD-9-CM   1. Malignant neoplasm of upper-outer quadrant of left breast in female, estrogen receptor positive  C50.412 174.4    Z17.0 V86.0   2. At risk for lymphedema  Z91.89 V49.89   3. Scar condition and fibrosis of skin  L90.5 709.2        Patient History     Row Name 23 0800             History    Chief Complaint --  Lymphedema Surveillance Program  -TD      Brief Description of Current Complaint Patient is  a pleasant 71-year-old female who had a recent diagnosis of left papillary carcinoma ER/MT positive HER-2 negative who is scheduled for a mastectomy with sentinel node biopsy and possible axillary lymph node dissection on 3/8/2022  -TD         Fall Risk Assessment    Any falls in the past year: No  -TD      Does patient have a fear of falling No  -TD         Services    Are you currently receiving Home Health services No  -TD      Do you plan to receive Home Health services in the near future No  -TD         Daily Activities    Primary Language English  -TD      Are you able to read Yes  -TD      Are you able to write Yes  -TD      How does patient learn best? Listening;Pictures/Video;Reading;Demonstration  -TD      Barriers to learning None  -TD         Safety    Are you being hurt, hit, or frightened by anyone at home or in your life? No  -TD      Are you being neglected by a caregiver No  -TD      Have you had any of the following issues with N/A  -TD            User Key  (r) = Recorded By, (t) = Taken By, (c) = Cosigned By    Initials Name Provider Type    TD Apoorva Lopez OT Occupational Therapist                 Lymphedema     Row Name 04/13/23 0800             Subjective Pain    Able to rate subjective pain? yes  -TD      Pre-Treatment Pain Level 0  -TD      Post-Treatment Pain Level 0  -TD         Subjective Comments    Subjective Comments Pt would like to be fit for prosthetic breast  -TD         Lymphedema Assessment    Lymphedema Classification LUE:;at risk/stage 0  -TD      Lymphedema Cancer Related Sx bilateral;simple mastectomy;sentinel node biopsy  -TD      Lymphedema Surgery Comments 3/18/22  -TD      Lymph Nodes Removed # 3  -TD      Positive Lymph Nodes # 0  -TD      Chemo Received no  -TD      Radiation Therapy Received no  -TD         LLIS - Physical Concerns    The amount of pain associated with my lymphedema is: 0  -TD      The amount of limb heaviness associated with my lymphedema is: 0  -TD   "    The amount of skin tightness associated with my lymphedema is: 0  -TD      The size of my swollen limb(s) seems: 0  -TD      Lymphedema affects the movement of my swollen limb(s): 0  -TD      The strength in my swollen limb(s) is: 0  -TD         LLIS - Psychosocial Concerns    Lymphedema affects my body image (i.e., \"how I think I look\"). 0  -TD      Lymphedema affects my socializing with others. 0  -TD      Lymphedema affects my intimate relations with spouse or partner (rate 0 if not applicable 0  -TD      Lymphedema \"gets me down\" (i.e., depression, frustration, or anger) 0  -TD      I must rely on others for help due to my lymphedema. 0  -TD      I know what to do to manage my lymphedema 2  -TD         LLIS - Functional Concerns    Lymphedema affects my ability to perform self-care activities (i.e. eating, dressing, hygiene) 0  -TD      Lymphedema affects my ability to perform routine home or work-related activities. 0  -TD      Lymphedema affects my performance of preferred leisure activities. 0  -TD      Lymphedema affects proper fit of clothing/shoes 0  -TD      Lymphedema affects my sleep 0  -TD         Posture/Observations    Posture- WNL Posture is WNL  -TD         General ROM    GENERAL ROM COMMENTS BUE are WFL  -TD         MMT (Manual Muscle Testing)    General MMT Comments BUE are WFL  -TD         Skin Changes/Observations    Location/Assessment Upper Quadrant  -TD      Upper Quadrant Conditions left:;normal;intact  -TD         L-Dex Bioimpedence Screening    L-Dex Measurement Extremity LUE  -TD      L-Dex Patient Position Standing  -TD      L-Dex UE Dominate Side Right  -TD      L-Dex UE At Risk Side Left  -TD      L-Dex UE Pre Surgical Value Yes  -TD      L-Dex UE Score -0.6  -TD      L-Dex UE Baseline Score 1.3  -TD      L-Dex UE Value Change -1.9  -TD      $ L-Dex Charge yes  -TD         Lymphedema Life Impact Scale Totals    A.  Total Q1 - Q17 (Do not include Q18) 2  -TD      B.  Total number " of questions answered (Q1-Q17) 17  -TD      C. Divide A by B 0.12  -TD      D. Multiple C by 25 3  -TD            User Key  (r) = Recorded By, (t) = Taken By, (c) = Cosigned By    Initials Name Provider Type    Apoorva Reed OT Occupational Therapist                        Therapy Education  Education Details: Reviewed prevention/stop light recovery education  Given: Symptoms/condition management  Program: New, Reinforced  How Provided: Verbal  Provided to: Patient  Level of Understanding: Verbalized  24742 - OT Self Care/Mgmt Minutes: 15         OT Goals     Row Name 04/13/23 1153          Time Calculation    OT Goal Re-Cert Due Date 05/13/23  -TD           User Key  (r) = Recorded By, (t) = Taken By, (c) = Cosigned By    Initials Name Provider Type    Apoorva Reed OT Occupational Therapist              GOALS:   1. Post Breast Surgery Care/at risk for Lymphedema  LTG 1: 90 days:  As an indicator of no exacerbation of lymphedema staging, the patient will present with an L-Dex score less than [10] points from preoperative baseline.              STATUS: Met: ongoing  STG 1a:   30 days: To prevent exacerbation of mixed edema to lymphedema, patient will utilize the 2 postsurgical compression garments daily.                 STATUS: Not Met: discontinue  STG 1b: 30 days: Patient will be independent with self-manual lymphatic massage.               STATUS: Partial Met: ongoing  STG 1c: 30 days:  Patient will be independent with identification of signs and symptoms of lymphedema exasperation per stoplight to recovery education handout.              STATUS:  Partial Met: ongoing  STG 1 d: 30 days: Patient will be independent with HEP to prevent advancement in lymphedema staging.              STATUS: Partial Met: ongoing  TREATMENT:  Self Care/ADL retraining, Therapeutic Activity, Neuromuscular Re-education, Therapeutic Exercise, Bioimpedence Fluid Analysis, Post-Surgical compression garement [address]/ [employer] 2860K, Orthotic Management and training,  and Manual Therapy   OT Assessment/Plan     Row Name 04/13/23 0843          OT Assessment    Functional Limitations Performance in self-care ADL  -TD     Impairments Impaired lymphatic circulation  -TD     Assessment Comments Pt is demonsrating a normalized L-dex score.  Pt would like to be fitted for prothetic breast next session.  Will discuss with MD. Pt would beneift from continued skilled OT to prevent advancement in lymphedema staging.  -TD     OT Diagnosis at risk for lymphedema  -TD     OT Rehab Potential Good  -TD     Patient/caregiver participated in establishment of treatment plan and goals Yes  -TD     Patient would benefit from skilled therapy intervention Yes  -TD        OT Plan    OT Frequency --  see duration  -TD     Predicted Duration of Therapy Intervention (OT) Pt will be seen every 3 months from baseline for years 1-3 and every 6 months years 4 and 5  -TD     Planned CPT's? OT EVAL LOW COMPLEXITY: 78645;OT RE-EVAL: 01942;OT NEUROMUSC RE EDUCATION EA 15 MIN: 77137;OT THER PROC EA 15 MIN: 71155FN;OT THER ACT EA 15 MIN: 07377HC;OT SELF CARE/MGMT/TRAIN 15 MIN: 43357;OT HOT/COLD PACK;OT ULTRASOUND EA 15 MIN: 08655;OT ELECTRIC STIM ATTD EA 15 MIN: 96778;OT MANUAL THERAPY EA 15 MIN: 13232;OT BIS XTRACELL FLUID ANALYSIS: 53787;OT ORTHO/PROSTHET CHECKOUT EA 15 MIN: 84310;OT ORTHOTIC MGMT/TRAIN EA 15 MIN: 91385  -TD     Planned Therapy Interventions (Optional Details) patient/family education;home exercise program;orthotic fitting/training;prosthetic fitting/training;postural re-education;strengthening;stretching;ROM (Range of Motion);manual therapy techniques  -TD     OT Plan Comments see duration  -TD           User Key  (r) = Recorded By, (t) = Taken By, (c) = Cosigned By    Initials Name Provider Type    Apoorva Reed OT Occupational Therapist                          Time Calculation:   Timed Charges  49908 - OT Self  Care/Mgmt Minutes: 15  Total Minutes  Timed Charges Total Minutes: 15   Total Minutes: 15     Therapy Charges for Today     Code Description Service Date Service Provider Modifiers Qty    99446703157 HC PT BIS XTRACELL FLUID ANALYSIS 4/13/2023 Apoorva Lopez OT  1    69585470627 HC OT SELF CARE/MGMT/TRAIN EA 15 MIN 4/13/2023 Apoorva Lopez OT GO 1                    Apoorva Lopez OT  4/13/2023

## 2023-06-08 ENCOUNTER — TELEPHONE (OUTPATIENT)
Dept: ONCOLOGY | Facility: HOSPITAL | Age: 73
End: 2023-06-08

## 2023-06-08 NOTE — TELEPHONE ENCOUNTER
Caller: Татьяна Jackson    Relationship: Self    Best call back number: [phone]    What is the best time to reach you: ANY    Who are you requesting to speak with (clinical staff, provider,  specific staff member): SCHEDULING    What was the call regarding: ТАТЬЯНА IS CALLING TO RESCHEDULE HER APPOINTMENT THAT SHE CANCELED IN APRIL    Is it okay if the provider responds through MyChart: YES

## 2023-06-29 ENCOUNTER — OFFICE VISIT (OUTPATIENT)
Dept: ONCOLOGY | Facility: HOSPITAL | Age: 73
End: 2023-06-29
Payer: MEDICARE

## 2023-06-29 VITALS
DIASTOLIC BLOOD PRESSURE: 90 MMHG | HEART RATE: 92 BPM | OXYGEN SATURATION: 100 % | BODY MASS INDEX: 27.4 KG/M2 | SYSTOLIC BLOOD PRESSURE: 163 MMHG | HEIGHT: 67 IN | RESPIRATION RATE: 16 BRPM | WEIGHT: 174.6 LBS | TEMPERATURE: 97.3 F

## 2023-06-29 DIAGNOSIS — R55 NEAR SYNCOPE: ICD-10-CM

## 2023-06-29 DIAGNOSIS — D05.12 INTRADUCTAL PAPILLARY ADENOCARCINOMA OF LEFT BREAST: Primary | ICD-10-CM

## 2023-06-29 DIAGNOSIS — R42 DIZZINESS: ICD-10-CM

## 2023-06-29 PROCEDURE — G0463 HOSPITAL OUTPT CLINIC VISIT: HCPCS | Performed by: INTERNAL MEDICINE

## 2023-06-29 NOTE — PROGRESS NOTES
Patient  Татьяна Jackson    Location  [facility] HEMATOLOGY & ONCOLOGY    Chief Complaint  Intraductal papillary adenocarcinoma of left breast    Referring Provider: SHERYL Tracey  PCP: Jenni Petersen APRN    Subjective          Oncology/Hematology History Overview Note     Left Papillary Breast Cancer:   - Diagnostic mammogram 22: A 4cm mass with possible satellite nodules at 2:00, 8cm from the nipple.  Some lymph nodes in the left axilla also show cortical thickening.    - core biopsy of the right breast on 22: fragments of papillary breast cancer, ER+ (95%), LA+ (10%), HER2 untested  - Bilateral Mastectomy 3/8/22: Left breast with encapsulated papillary carcinoma, grade 1-2 with microscopic foci of extracapsular invasion, ER positive (100%), LA positive (60%), HER-2 negative (score 0), Ki-67 15%. Right breast with ADH and intraductal papilloma  - Patient declined oncotype testing and decline treatment with adjuvant endocrine therapy    Liver abnormality:  - low density foci in the liver noted on breast MRI.  The pt reports she has known about this 25 years and it is unchanged.       Intraductal papillary adenocarcinoma of left breast   2022 Initial Diagnosis    Intraductal papillary adenocarcinoma of left breast         History of Present Illness  Patient comes in today for 6-month follow-up.  She is not currently on antiestrogen therapy since she elected to forego this.  She is still having ongoing intermittent dizziness.  She admits to canceling the cardiology appointment due to the fact that her son  recently.  She and the family are coping as well as can be expected.    Review of Systems   Constitutional:  Negative for appetite change, diaphoresis, fatigue, fever, unexpected weight gain and unexpected weight loss.   HENT:  Negative for hearing loss, mouth sores, sore throat, swollen glands, trouble swallowing and voice change.    Eyes:  Negative for blurred vision.    Respiratory:  Negative for cough, shortness of breath and wheezing.    Cardiovascular:  Negative for chest pain and palpitations.   Gastrointestinal:  Positive for abdominal distention. Negative for abdominal pain, blood in stool, constipation, diarrhea, nausea and vomiting.   Endocrine: Negative for cold intolerance and heat intolerance.   Genitourinary:  Negative for difficulty urinating, dysuria, frequency, hematuria and urinary incontinence.   Musculoskeletal:  Positive for arthralgias, joint swelling (RT leg, ankle, and foot swelled) and myalgias. Negative for back pain.   Skin:  Negative for rash, skin lesions and wound.   Neurological:  Negative for dizziness, seizures, weakness, numbness and headache.   Hematological:  Does not bruise/bleed easily.   Psychiatric/Behavioral:  Negative for depressed mood. The patient is not nervous/anxious.    All other systems reviewed and are negative.    Past Medical History:   Diagnosis Date    Arthritis     Breast cancer     left breast, diagnosed with breast cancer 2 weeks ago, [facility]    Status post bilateral mastectomy with left sentinel node biopsy 3/9/2022    Ulcerative colitis      Past Surgical History:   Procedure Laterality Date    [place]ONOSCOPY      [place]'t [place] name of hospital     COLONOSCOPY N/A 3/3/2022    Procedure: COLONOSCOPY WITH BX, POLYPECTOMIES, HOT SNARE;  Surgeon: Margie Dotson MD;  Location: [facility] ENDOSCOPY;  Service: Gastroenterology;  Laterality: N/A;  COLON POLYPS, HEMORRHOIDS    HERNIA REPAIR      UMBILICAL HERNIA REPAIR    MASTECTOMY W/ SENTINEL NODE BIOPSY Bilateral 3/8/2022    Procedure: BREAST MASTECTOMY WITH SENTINEL NODE BIOPSY;  Surgeon: Deborah Guillaume MD;  Location: [facility];  Service: General;  Laterality: Bilateral;    UPPER GASTROINTESTINAL ENDOSCOPY       Social History     Socioeconomic History    Marital status:    Tobacco Use    Smoking status: Never    Smokeless tobacco:  "Never   Vaping Use    Vaping Use: Never used   Substance and Sexual Activity    Alcohol use: Not Currently    Drug use: Never    Sexual activity: Defer     Family History   Problem Relation Age of Onset    Cancer Sister         gallbladder    Diabetes Mother     Malig Hyperthermia Neg Hx        Objective   Physical Exam  General: Alert, cooperative, no acute distress  Eyes: Anicteric sclera, PERRLA  Respiratory: normal respiratory effort  Cardiovascular: no lower extremity edema  Skin: Normal tone, no rash, no lesions  Psychiatric: Appropriate affect, intact judgment  Neurologic: No focal sensory or motor deficits, normal cognition   Musculoskeletal: Normal muscle strength and tone  Extremities: No clubbing, cyanosis, or deformities    Vitals:    06/29/23 0933   BP: 163/90   Pulse: 92   Resp: 16   Temp: 97.3 øF (36.3 øC)   SpO2: 100%   Weight: 79.2 kg (174 lb 9.7 oz)   Height: 170.2 cm (67.01\")   PainSc:   9   PainLoc: Leg  Comment: RT leg, ankle, and foot     ECOG score: 0         PHQ-9 Total Score:         Result Review :   The following data was reviewed by: Jaida Cline MD PhD on 06/29/2023:  Lab Results   Component Value Date    HGB 13.7 06/29/2022    HCT 42.4 06/29/2022    MCV 83.3 06/29/2022     06/29/2022    WBC 9.22 06/29/2022    NEUTROABS 6.39 06/29/2022    LYMPHSABS 2.05 06/29/2022    MONOSABS 0.49 06/29/2022    EOSABS 0.23 06/29/2022    BASOSABS 0.05 06/29/2022     Lab Results   Component Value Date    GLUCOSE 101 (H) 06/29/2022    BUN 14 06/29/2022    CREATININE 0.93 06/29/2022     06/29/2022    K 3.5 06/29/2022     06/29/2022    CO2 25.7 06/29/2022    CALCIUM 9.8 06/29/2022    PROTEINTOT 7.7 06/29/2022    ALBUMIN 4.40 06/29/2022    BILITOT 0.2 06/29/2022    ALKPHOS 77 06/29/2022    AST 16 06/29/2022    ALT 11 06/29/2022     No results found for: IRON, LABIRON, TRANSFERRIN, TIBC  No results found for: LDH, FERRITIN, [id number], FOLATE  No results found for: PSA, CEA, AFP, , "        Assessment and Plan    Diagnoses and all orders for this visit:    1. Intraductal papillary adenocarcinoma of left breast [D05.12 (ICD-10-CM)] (Primary)    2. Dizziness    3. Near syncope        ER/MD+ Left Papillary Breast Cancer: s/p Bilateral mastectomy. Pt declined adjuvant endocrine therapy.  I will f/u with her in 1 year.    Dizziness and near syncope: I am most concerned the patient may be experiencing an irregular heartbeat.  I strongly encouraged her to reschedule the cardiology appointment.  I also encouraged the patient to go back to her primary care provider for evaluation.    Patient was given instructions and counseling regarding her condition or for health maintenance advice. Please see specific information pulled into the AVS if appropriate.     Jaida Cline MD PhD    8/15/2023

## 2023-08-15 PROBLEM — R42 DIZZINESS: Status: ACTIVE | Noted: 2023-08-15

## 2023-08-17 ENCOUNTER — TELEMEDICINE (OUTPATIENT)
Dept: INTERNAL MEDICINE | Facility: CLINIC | Age: 73
End: 2023-08-17
Payer: MEDICARE

## 2023-08-17 DIAGNOSIS — D05.12 INTRADUCTAL PAPILLARY ADENOCARCINOMA OF LEFT BREAST: ICD-10-CM

## 2023-08-17 DIAGNOSIS — Z00.00 ENCOUNTER FOR ANNUAL WELLNESS EXAM IN MEDICARE PATIENT: Primary | ICD-10-CM

## 2023-08-17 NOTE — PROGRESS NOTES
The ABCs of the Annual Wellness Visit  Subsequent Medicare Wellness Visit    Subjective    Татьяна Jackson is a 73 y.o. female who presents for a Subsequent Medicare Wellness Visit.    The following portions of the patient's history were reviewed and   updated as appropriate: allergies, current medications, past family history, past medical history, past social history, past surgical history, and problem list.    You have chosen to receive care through a telehealth visit.  Do you consent to use a video/audio connection for your medical care today? Yes      Compared to one year ago, the patient feels her physical   health is the same.    Compared to one year ago, the patient feels her mental   health is better.    Recent Hospitalizations:  She was not admitted to the hospital during the last year.       Current Medical Providers:  Patient Care Team:  Jenni Petersen APRN as PCP - General (Nurse Practitioner)  Deborah Guillaume MD as Consulting Physician (General Surgery)  Jaida Cline MD PhD as Consulting Physician (Hematology and Oncology)    Outpatient Medications Prior to Visit   Medication Sig Dispense Refill    ascorbic acid (VITAMIN C) 500 MG tablet Take 1 tablet by mouth.      multivitamin (THERAGRAN) tablet tablet Take 1 tablet by mouth.      vitamin D3 125 MCG (5000 UT) capsule capsule Take 1 capsule by mouth.      HYDROcodone-acetaminophen (NORCO) 5-325 MG per tablet Take 1 tablet by mouth Every 6 (Six) Hours As Needed for Moderate Pain . 12 tablet 0     No facility-administered medications prior to visit.       Opioid medication/s are on active medication list.  and I have evaluated her active treatment plan and pain score trends (see table).  There were no vitals filed for this visit.  I have reviewed the chart for potential of high risk medication and harmful drug interactions in the elderly.          Aspirin is not on active medication list.  Aspirin use is not indicated based on review of  "current medical condition/s. Risk of harm outweighs potential benefits.  .    Patient Active Problem List   Diagnosis    Ulcerative colitis    Primary hypertension    Overweight (BMI 25.0-29.9)    Arthralgia    Right wrist pain    Slow transit constipation    Herpes zoster without complication    Intraductal papillary adenocarcinoma of left breast    Near syncope    Hematochezia    Status post bilateral mastectomy with left sentinel node biopsy    Dizziness     Advance Care Planning   Advance Care Planning     Advance Directive is not on file.  ACP discussion was held with the patient during this visit. Patient has an advance directive (not in EMR), copy requested. Patient does not have an advance directive, declines further assistance.     Objective    There were no vitals filed for this visit.  Estimated body mass index is 27.15 kg/mý as calculated from the following:    Height as of 23: 170.2 cm (67.01\").    Weight as of 23: 78.7 kg (173 lb 6.4 oz).    BMI is >= 25 and <30. (Overweight) The following options were offered after discussion;: nutrition counseling/recommendations      Does the patient have evidence of cognitive impairment? No          HEALTH RISK ASSESSMENT    Smoking Status:  Social History     Tobacco Use   Smoking Status Never   Smokeless Tobacco Never     Alcohol Consumption:  Social History     Substance and Sexual Activity   Alcohol Use Not Currently     Fall Risk Screen:    STEADI Fall Risk Assessment was completed, and patient is at LOW risk for falls.Assessment completed on:2023    Depression Screenin/17/2023     3:46 PM   PHQ-2/PHQ-9 Depression Screening   Little Interest or Pleasure in Doing Things 0-->not at all   Feeling Down, Depressed or Hopeless 0-->not at all   PHQ-9: Brief Depression Severity Measure Score 0       Health Habits and Functional and Cognitive Screenin/17/2023     3:46 PM   Functional & Cognitive Status   Do you have difficulty preparing " food and eating? No   Do you have difficulty bathing yourself, getting dressed or grooming yourself? No   Do you have difficulty using the toilet? No   Do you have difficulty moving around from place to place? No   Do you have trouble with steps or getting out of a bed or a chair? No   Current Diet Well Balanced Diet   Dental Exam Up to date   Eye Exam Not up to date   Exercise (times per week) 5 times per week   Current Exercises Include Walking   Do you need help using the phone?  No   Are you deaf or do you have serious difficulty hearing?  No   Do you need help to go to places out of walking distance? No   Do you need help shopping? No   Do you need help preparing meals?  No   Do you need help with housework?  No   Do you need help with laundry? No   Do you need help taking your medications? No   Do you need help managing money? No   Do you ever drive or ride in a car without wearing a seat belt? No   Have you felt unusual stress, anger or loneliness in the last month? No   Who do you live with? Child   If you need help, do you have trouble finding someone available to you? No   Have you been bothered in the last four weeks by sexual problems? No   Do you have difficulty concentrating, remembering or making decisions? No       Age-appropriate Screening Schedule:  Refer to the list below for future screening recommendations based on patient's age, sex and/or medical conditions. Orders for these recommended tests are listed in the plan section. The patient has been provided with a written plan.    Health Maintenance   Topic Date Due    COVID-19 Vaccine (1) Never done    TDAP/TD VACCINES (1 - Tdap) Never done    ZOSTER VACCINE (1 of 2) Never done    Pneumococcal Vaccine 65+ (1 - PCV) Never done    HEPATITIS C SCREENING  Never done    LIPID PANEL  10/07/2022    INFLUENZA VACCINE  10/01/2023    DXA SCAN  01/05/2024    COLORECTAL CANCER SCREENING  03/03/2024    ANNUAL WELLNESS VISIT  08/17/2024                  CMS  Preventative Services Quick Reference  Risk Factors Identified During Encounter  Immunizations Discussed/Encouraged: Pneumococcal 23 and Shingrix  The above risks/problems have been discussed with the patient.  Pertinent information has been shared with the patient in the After Visit Summary.  An After Visit Summary and PPPS were made available to the patient.    Follow Up:   Next Medicare Wellness visit to be scheduled in 1 year.       Additional E&M Note during same encounter follows:  Patient has multiple medical problems which are significant and separately identifiable that require additional work above and beyond the Medicare Wellness Visit.      Chief Complaint  Medicare Wellness-subsequent    Subjective        HPI  Татьяна Jackson is also being seen today for     She had bilateral mastectomy.   She reports having occasional light headedness and near syncope. She reports that he balance may be off due to mastectomy.    She reports some chronic knee pain. She is doing PT herself at home. Has switched to wearing tennis shoes which is helping with pain and balance.     Taking collagen    Her son passed away last year. She lost her home and sister passed away as well.  She feels like she is doing well despite these losses. She is working on her relationship with God and has been going to [employer] again.       Objective   Vital Signs:  There were no vitals taken for this visit.    Physical Exam  Vitals and nursing note reviewed.   Constitutional:       General: She is not in acute distress.     Appearance: Normal appearance.   HENT:      Head: Normocephalic and atraumatic.      Right Ear: External ear normal.      Left Ear: External ear normal.      Nose: Nose normal.      Mouth/Throat:      Mouth: Mucous membranes are moist.   Eyes:      Conjunctiva/sclera: Conjunctivae normal.   Cardiovascular:      Rate and Rhythm: Normal rate and regular rhythm.      Pulses: Normal pulses.      Heart sounds: Normal heart  sounds. No murmur heard.    No friction rub. No gallop.   Pulmonary:      Effort: Pulmonary effort is normal. No respiratory distress.      Breath sounds: No wheezing, rhonchi or rales.   Musculoskeletal:      Cervical back: Neck supple.      Right lower leg: No edema.      Left lower leg: No edema.   Skin:     General: Skin is warm and dry.   Neurological:      General: No focal deficit present.      Mental Status: She is alert and oriented to person, place, and time.   Psychiatric:         Mood and Affect: Mood normal.         Behavior: Behavior normal.                       Assessment and Plan   Diagnoses and all orders for this visit:    1. Encounter for annual wellness exam in Medicare patient (Primary)    2. Intraductal papillary adenocarcinoma of left breast  Comments:  she will f/u with oncology as scheduled             Follow Up   No follow-ups on file.  Patient was given instructions and counseling regarding her condition or for health maintenance advice. Please see specific information pulled into the AVS if appropriate.

## 2023-08-22 ENCOUNTER — HOSPITAL ENCOUNTER (OUTPATIENT)
Dept: OCCUPATIONAL THERAPY | Facility: HOSPITAL | Age: 73
Setting detail: THERAPIES SERIES
Discharge: HOME | End: 2023-08-22
Payer: MEDICARE

## 2023-10-18 ENCOUNTER — HOSPITAL ENCOUNTER (OUTPATIENT)
Dept: OCCUPATIONAL THERAPY | Facility: HOSPITAL | Age: 73
Setting detail: THERAPIES SERIES
Discharge: HOME | End: 2023-10-18
Payer: MEDICARE

## 2023-10-18 DIAGNOSIS — N64.89 DEFORMITY DUE TO MASTECTOMY: ICD-10-CM

## 2023-10-18 DIAGNOSIS — C50.412 MALIGNANT NEOPLASM OF UPPER-OUTER QUADRANT OF LEFT BREAST IN FEMALE, ESTROGEN RECEPTOR POSITIVE: ICD-10-CM

## 2023-10-18 DIAGNOSIS — Z91.89 AT RISK FOR LYMPHEDEMA: ICD-10-CM

## 2023-10-18 DIAGNOSIS — Z44.9 FITTING AND ADJUSTMENT OF UNSPECIFIED PROSTHETIC DEVICE: ICD-10-CM

## 2023-10-18 DIAGNOSIS — Z90.10 DEFORMITY DUE TO MASTECTOMY: ICD-10-CM

## 2023-10-18 DIAGNOSIS — Z90.13 HISTORY OF MASTECTOMY, BILATERAL: Primary | ICD-10-CM

## 2023-10-18 DIAGNOSIS — Z17.0 MALIGNANT NEOPLASM OF UPPER-OUTER QUADRANT OF LEFT BREAST IN FEMALE, ESTROGEN RECEPTOR POSITIVE: ICD-10-CM

## 2023-10-18 PROCEDURE — 93702 BIS XTRACELL FLUID ANALYSIS: CPT | Performed by: OCCUPATIONAL THERAPIST

## 2023-10-18 PROCEDURE — L8000 MASTECTOMY BRA: HCPCS | Performed by: OCCUPATIONAL THERAPIST

## 2023-10-18 PROCEDURE — 97535 SELF CARE MNGMENT TRAINING: CPT | Performed by: OCCUPATIONAL THERAPIST

## 2023-10-18 NOTE — THERAPY RE-EVALUATION
Outpatient Occupational Therapy Lymphedema Re-Evaluation   [facility]     Patient Name: Татьяна Jackson  : 1950  MRN: [id number]  Today's Date: 10/18/2023      Visit Date: 10/18/2023    Patient Active Problem List   Diagnosis    Ulcerative colitis    Primary hypertension    Overweight (BMI 25.0-29.9)    Arthralgia    Right wrist pain    Slow transit constipation    Herpes zoster without complication    Intraductal papillary adenocarcinoma of left breast    Near syncope    Hematochezia    Status post bilateral mastectomy with left sentinel node biopsy    Dizziness        Past Medical History:   Diagnosis Date    Arthritis     Breast cancer     left breast, diagnosed with breast cancer 2 weeks ago, [facility]    Status post bilateral mastectomy with left sentinel node biopsy 3/9/2022    Ulcerative colitis         Past Surgical History:   Procedure Laterality Date    COLONOSCOPY      [place]'t know name of hospital     COLONOSCOPY N/A 3/3/2022    Procedure: COLONOSCOPY WITH BX, POLYPECTOMIES, HOT SNARE;  Surgeon: Margie Dotson MD;  Location: [facility] ENDOSCOPY;  Service: Gastroenterology;  Laterality: N/A;  COLON POLYPS, HEMORRHOIDS    HERNIA REPAIR      UMBILICAL HERNIA REPAIR    MASTECTOMY W/ SENTINEL NODE BIOPSY Bilateral 3/8/2022    Procedure: BREAST MASTECTOMY WITH SENTINEL NODE BIOPSY;  Surgeon: Deborah Guillaume MD;  [place] [facility] [place] [facility];  Service: General;  Laterality: Bilateral;    UPPER GASTROINTESTINAL ENDOSCOPY           Visit Dx:     ICD-10-CM ICD-9-CM   1. History of mastectomy, bilateral  Z90.13 V45.71   2. Deformity due to mastectomy  N64.89 611.89    Z90.10    3. Fitting and adjustment of unspecified prosthetic device  Z44.9 V52.9   4. At risk for lymphedema  Z91.89 V49.89   5. Malignant neoplasm of upper-outer quadrant of left breast in female, estrogen receptor positive  C50.412 174.4    Z17.0 V86.0        Patient History       Row Name 10/18/23 0900              History    Chief Complaint --  Lymphedema Surveillance Program  -TD      Brief Description of Current Complaint Patient is a pleasant 71-year-old female who had a recent diagnosis of left papillary carcinoma ER/MT positive HER-2 negative who is scheduled for a mastectomy with sentinel node biopsy and possible axillary lymph node dissection on 3/8/2022  -TD         Fall Risk Assessment    Any falls in the past year: No  -TD      Does patient have a fear of falling No  -TD         Services    Are you currently receiving Home Health services No  -TD      Do you plan to receive Home Health services in the near future No  -TD         Daily Activities    Primary Language English  -TD      Are you able to read Yes  -TD      Are you able to write Yes  -TD      How does patient learn best? Listening;Pictures/Video;Reading;Demonstration  -TD      Barriers to learning None  -TD         Safety    Are you being hurt, hit, or frightened by anyone at home or in your life? No  -TD      Are you being neglected by a caregiver No  -TD      Have you had any of the following issues with N/A  -TD                User Key  (r) = Recorded By, (t) = Taken By, (c) = Cosigned By      Initials Name Provider Type    TD Apoorva Lopez OT Occupational Therapist                     Lymphedema       Row Name 10/18/23 0900             Subjective Pain    Able to rate subjective pain? yes  -TD      Pre-Treatment Pain Level 0  -TD      Post-Treatment Pain Level 0  -TD         Subjective    Subjective Comments Pt reports that she has prosthetics but does need new bras.  -TD         Lymphedema Assessment    Lymphedema Classification LUE:;at risk/stage 0  -TD      Lymphedema Cancer Related Sx bilateral;simple mastectomy;sentinel node biopsy  -TD      Lymphedema Surgery Comments 3/18/22  -TD      Lymph Nodes Removed # 3  -TD      Positive Lymph Nodes # 0  -TD      Chemo Received no  -TD      Radiation Therapy Received no  -TD         LLIS - Physical  "Concerns    The amount of pain associated with my lymphedema is: 0  -TD      The amount of limb heaviness associated with my lymphedema is: 0  -TD      The amount of skin tightness associated with my lymphedema is: 0  -TD      The size of my swollen limb(s) seems: 0  -TD      Lymphedema affects the movement of my swollen limb(s): 0  -TD      The strength in my swollen limb(s) is: 0  -TD         LLIS - Psychosocial Concerns    Lymphedema affects my body image (i.e., \"how I think I look\"). 0  -TD      Lymphedema affects my socializing with others. 0  -TD      Lymphedema affects my intimate relations with spouse or partner (rate 0 if not applicable 0  -TD      Lymphedema \"gets me down\" (i.e., depression, frustration, or anger) 0  -TD      I must rely on others for help due to my lymphedema. 0  -TD      I know what to do to manage my lymphedema 2  -TD         LLIS - Functional Concerns    Lymphedema affects my ability to perform self-care activities (i.e. eating, dressing, hygiene) 0  -TD      Lymphedema affects my ability to perform routine home or work-related activities. 0  -TD      Lymphedema affects my performance of preferred leisure activities. 0  -TD      Lymphedema affects proper fit of clothing/shoes 0  -TD      Lymphedema affects my sleep 0  -TD         Posture/Observations    Posture- WNL Posture is WNL  -TD         General ROM    GENERAL ROM COMMENTS BUE are WFL  -TD         MMT (Manual Muscle Testing)    General MMT Comments BUE are WFL  -TD         L-Dex Bioimpedence Screening    L-Dex Measurement Extremity LUE  -TD      L-Dex Patient Position Standing  -TD      L-Dex UE Dominate Side Right  -TD      L-Dex UE At Risk Side Left  -TD      L-Dex UE Pre Surgical Value Yes  -TD      L-Dex UE Score 9  -TD      L-Dex UE Baseline Score 1.3  -TD      L-Dex UE Value Change 7.7  -TD      $ L-Dex Charge yes  -TD         Lymphedema Life Impact Scale Totals    A.  Total Q1 - Q17 (Do not include Q18) 2  -TD      B.  " Total number of questions answered (Q1-Q17) 17  -TD      C. Divide A by B 0.12  -TD      D. Multiple C by 25 3  -TD                User Key  (r) = Recorded By, (t) = Taken By, (c) = Cosigned By      Initials Name Provider Type    TD Apoorva Lopez, OT Occupational Therapist                     OT Ortho       Row Name 10/18/23 0900             Orthotics & Prosthetics Management    Orthosis Location other (see comments)  Breast Prosthetic and post mastectomy orthosis.  -TD      Additional Documentation Orthosis Location (Row)  -TD                User Key  (r) = Recorded By, (t) = Taken By, (c) = Cosigned By      Initials Name Provider Type    TD Apoorva Lopez, OT Occupational Therapist                    OT Mastectomy Care:   Location: Bilateral chest wall    Type of Prosthetic:  Silicone breast form    Reason for Visit/Customer Goal:  Patient would like to achieve symmetry and balance in appearance to improve orthopedic balance as well as improve psychological impact when engaging in community and social activities.    Reason for Prosthetic: Prevent Deformities    Date of Surgery: 3/8/2022    Date of Discharge: 3/9/2022    Wearing Schedule: As Tolerated    Prosthetic Site Condition: Intact    Tolerance: Tolerates Well    Product :  Andreea    Product Name and Model Number:   Pt reported she had purchased them independently.      Garments  Type of Garment Dispensed: Mast Bra w/o Breast Form    Breast : Andreea    Product Name:   2-Vee size small pink leesa/nude     Number of Garments Dispensed: 2       Therapy Education  Education Details: Fit and size are appropriate with no gapping, pinching, or puckering observed. Pt. states comfort and satisfaction with both bra's selected and with prosthesis. All devices were checked for quality and safety. Pt. was educated on the benefits, precautions warranty, care and maintenance for her prosthesis and bras. Educational handout was provided in the  prosthesis box.  Given: Symptoms/condition management, HEP  Program: New, Reinforced  How Provided: Verbal  Provided to: Patient  Level of Understanding: Verbalized  67558 - OT Self Care/Mgmt Minutes: 25         OT Goals       Row Name 10/18/23 1409          Time Calculation    OT Goal Re-Cert Due Date 11/17/23  -TD               User Key  (r) = Recorded By, (t) = Taken By, (c) = Cosigned By      Initials Name Provider Type    Apoorva Reed OT Occupational Therapist                  GOALS:   1. Post Breast Surgery Care/at risk for Lymphedema  LTG 1: 90 days:  As an indicator of no exacerbation of lymphedema staging, the patient will present with an L-Dex score less than [10] points from preoperative baseline.              STATUS: Met: ongoing  STG 1a:   30 days: To prevent exacerbation of mixed edema to lymphedema, patient will utilize the 2 postsurgical compression garments daily.                 STATUS: Not Met: discontinue  STG 1b: 30 days: Patient will be independent with self-manual lymphatic massage.               STATUS: Partial Met: ongoing  STG 1c: 30 days:  Patient will be independent with identification of signs and symptoms of lymphedema exasperation per stoplight to recovery education handout.              STATUS:  Partial Met: ongoing  STG 1 d: 30 days: Patient will be independent with HEP to prevent advancement in lymphedema staging.              STATUS: Partial Met: ongoing  TREATMENT:  Self Care/ADL retraining, Therapeutic Activity, Neuromuscular Re-education, Therapeutic Exercise, Bioimpedence Fluid Analysis, Post-Surgical compression garement [address] Zip-ST-High/ [place] Camisole Kit 2860K, Orthotic Management and training,  and Manual Therapy    1. Encounter for Breast Prosthetic and mastectomy bra fitting:  LTG 1:  90 days: To provide balance and symmetry for performance of daily activity, the patient will participate in breast prosthesis and mastectomy bra fitting procedure.   STATUS:  New  STG 1a: 30 days:  Patient will participate in mastectomy bra fit re-evaluation to ensure proper fit for balance and symmetry for improved postural alignment/lymph fluid dynamics to prevent impairment in activities of daily living.   STATUS: New  STG 1b: 30 days:  Patient will participate in breast prosthesis fit re-evaluation 2 years after initial distribution to ensure proper fit for balance and symmetry for improved postural alignment/lymph fluid dynamics to prevent impairment in activities of daily living.  STATUS: New  TREATMENT: Orthotic/Prosthetic Management and Training, [place] Silicone Breast Prosthetic, Mastectomy Bra initial fitting and 3-4 month re-fitting, ADL/Self Care, Therapeutic Activity, Therapeutic Exercise, and Manual Therapy.      OT Assessment/Plan       Row Name 10/18/23 0952          OT Assessment    Functional Limitations Performance in self-care ADL  -TD     Impairments Impaired lymphatic circulation;Posture;Impaired postural alignment  -TD     Assessment Comments Pt presents with decreased balance and symmetry from breast resection that impacts orthopedic balance and symmetry. Patient will benefit from continued evaluation and of integrity of the silicone breast form as well as the mastectomy bras to ensure proper fit for symmetry and balance of breast prosthesis to reduce orthopedic and lymphatic impairment. The skills of a therapist will be required to safely and effectively implement the following treatment plan to restore maximal level of function.  Pt would benefit from continued skilled OT to prevent increased staging of lymphedema, increased pain, and decreased AROM.  -TD     OT Diagnosis at risk for lymphedema  -TD     OT Rehab Potential Good  -TD     Patient/caregiver participated in establishment of treatment plan and goals Yes  -TD     Patient would benefit from skilled therapy intervention Yes  -TD        OT Plan    OT Frequency --  see duration  -TD     Predicted Duration  of Therapy Intervention (OT) Pt will be seen every 3 months from baseline for years 1-3 and every 6 months years 4 and 5. Pt will be seen every two years for breast prothosis refill and every 3-4 months for bra refill.  -TD     Planned CPT's? OT EVAL LOW COMPLEXITY: 64209;OT RE-EVAL: 27828;OT NEUROMUSC RE EDUCATION EA 15 MIN: 74698;OT THER PROC EA 15 MIN: 30720YZ;OT THER ACT EA 15 MIN: 41019MN;OT SELF CARE/MGMT/TRAIN 15 MIN: 19186;OT HOT/COLD PACK;OT ULTRASOUND EA 15 MIN: 92703;OT ELECTRIC STIM ATTD EA 15 MIN: 75419;OT MANUAL THERAPY EA 15 MIN: 67510;OT BIS XTRACELL FLUID ANALYSIS: 97071;OT ORTHO/PROSTHET CHECKOUT EA 15 MIN: 55182;OT ORTHOTIC MGMT/TRAIN EA 15 MIN: 34381  -TD     Planned Therapy Interventions (Optional Details) patient/family education;home exercise program;orthotic fitting/training;prosthetic fitting/training;postural re-education;strengthening;stretching;ROM (Range of Motion);manual therapy techniques  -TD     OT Plan Comments continue POC  -TD               User Key  (r) = Recorded By, (t) = Taken By, (c) = Cosigned By      Initials Name Provider Type    TD Apoorva Lopez OT Occupational Therapist                              Time Calculation:   Timed Charges  98254 - OT Self Care/Mgmt Minutes: 25  Total Minutes  Timed Charges Total Minutes: 25   Total Minutes: 25     Therapy Charges for Today       Code Description Service Date Service Provider Modifiers Qty    11454719796 HC PT BIS XTRACELL FLUID ANALYSIS 10/18/2023 Apoorva Lopez OT  1    87576179342 HC OT SELF CARE/MGMT/TRAIN EA 15 MIN 10/18/2023 Apoorva Lopez OT GO 2    19676496935 HC BRA POST/SURG NO/FORM RACQUEL/DIONICIO/FAROOQ/POWER/GABRIEAL 10/18/2023 Apoorva Lopez OT  2                      Apoorva Lopez OT  10/18/2023

## 2023-11-14 ENCOUNTER — APPOINTMENT (OUTPATIENT)
Dept: GENERAL RADIOLOGY | Facility: HOSPITAL | Age: 73
End: 2023-11-14
Payer: MEDICARE

## 2023-11-14 ENCOUNTER — HOSPITAL ENCOUNTER (OUTPATIENT)
Facility: HOSPITAL | Age: 73
Setting detail: OBSERVATION
Discharge: HOME | End: 2023-11-15
Attending: EMERGENCY MEDICINE | Admitting: INTERNAL MEDICINE
Payer: MEDICARE

## 2023-11-14 ENCOUNTER — TELEPHONE (OUTPATIENT)
Dept: INTERNAL MEDICINE | Facility: CLINIC | Age: 73
End: 2023-11-14
Payer: MEDICARE

## 2023-11-14 ENCOUNTER — APPOINTMENT (OUTPATIENT)
Dept: CT IMAGING | Facility: HOSPITAL | Age: 73
End: 2023-11-14
Payer: MEDICARE

## 2023-11-14 ENCOUNTER — APPOINTMENT (OUTPATIENT)
Dept: MRI IMAGING | Facility: HOSPITAL | Age: 73
End: 2023-11-14
Payer: MEDICARE

## 2023-11-14 DIAGNOSIS — Z78.9 DECREASED ACTIVITIES OF DAILY LIVING (ADL): ICD-10-CM

## 2023-11-14 DIAGNOSIS — R26.2 DIFFICULTY IN WALKING: ICD-10-CM

## 2023-11-14 DIAGNOSIS — G45.9 TIA (TRANSIENT ISCHEMIC ATTACK): Primary | ICD-10-CM

## 2023-11-14 DIAGNOSIS — R13.10 DYSPHAGIA, UNSPECIFIED TYPE: ICD-10-CM

## 2023-11-14 LAB
ALBUMIN SERPL-MCNC: 4.6 G/DL (ref 3.5–5.2)
ALBUMIN/GLOB SERPL: 1.4 G/DL
ALP SERPL-CCNC: 80 U/L (ref 39–117)
ALT SERPL W P-5'-P-CCNC: 12 U/L (ref 1–33)
ANION GAP SERPL CALCULATED.3IONS-SCNC: 8.8 MMOL/L (ref 5–15)
AST SERPL-CCNC: 18 U/L (ref 1–32)
BACTERIA UR QL AUTO: ABNORMAL /HPF
BASOPHILS # BLD AUTO: 0.07 10*3/MM3 (ref 0–0.2)
BASOPHILS NFR BLD AUTO: 0.9 % (ref 0–1.5)
BILIRUB SERPL-MCNC: 0.3 MG/DL (ref 0–1.2)
BILIRUB UR QL STRIP: NEGATIVE
BUN SERPL-MCNC: 11 MG/DL (ref 8–23)
BUN/CREAT SERPL: 15.5 (ref 7–25)
CALCIUM SPEC-SCNC: 9 MG/DL (ref 8.6–10.5)
CHLORIDE SERPL-SCNC: 105 MMOL/L (ref 98–107)
CLARITY UR: ABNORMAL
CO2 SERPL-SCNC: 28.2 MMOL/L (ref 22–29)
COLOR UR: YELLOW
CREAT SERPL-MCNC: 0.71 MG/DL (ref 0.57–1)
DEPRECATED RDW RBC AUTO: 46.2 FL (ref 37–54)
EGFRCR SERPLBLD CKD-EPI 2021: 89.4 ML/MIN/1.73
EOSINOPHIL # BLD AUTO: 0.19 10*3/MM3 (ref 0–0.4)
EOSINOPHIL NFR BLD AUTO: 2.4 % (ref 0.3–6.2)
ERYTHROCYTE [DISTWIDTH] IN BLOOD BY AUTOMATED COUNT: 15.3 % (ref 12.3–15.4)
GLOBULIN UR ELPH-MCNC: 3.3 GM/DL
GLUCOSE SERPL-MCNC: 99 MG/DL (ref 65–99)
GLUCOSE UR STRIP-MCNC: NEGATIVE MG/DL
HCT VFR BLD AUTO: 43.5 % (ref 34–46.6)
HGB BLD-MCNC: 14 G/DL (ref 12–15.9)
HGB UR QL STRIP.AUTO: NEGATIVE
HOLD SPECIMEN: NORMAL
HOLD SPECIMEN: NORMAL
HYALINE CASTS UR QL AUTO: ABNORMAL /LPF
IMM GRANULOCYTES # BLD AUTO: 0.02 10*3/MM3 (ref 0–0.05)
IMM GRANULOCYTES NFR BLD AUTO: 0.3 % (ref 0–0.5)
KETONES UR QL STRIP: NEGATIVE
LEUKOCYTE ESTERASE UR QL STRIP.AUTO: ABNORMAL
LYMPHOCYTES # BLD AUTO: 2.69 10*3/MM3 (ref 0.7–3.1)
LYMPHOCYTES NFR BLD AUTO: 33.9 % (ref 19.6–45.3)
MAGNESIUM SERPL-MCNC: 2.3 MG/DL (ref 1.6–2.4)
MCH RBC QN AUTO: 26.5 PG (ref 26.6–33)
MCHC RBC AUTO-ENTMCNC: 32.2 G/DL (ref 31.5–35.7)
MCV RBC AUTO: 82.2 FL (ref 79–97)
MONOCYTES # BLD AUTO: 0.55 10*3/MM3 (ref 0.1–0.9)
MONOCYTES NFR BLD AUTO: 6.9 % (ref 5–12)
NEUTROPHILS NFR BLD AUTO: 4.42 10*3/MM3 (ref 1.7–7)
NEUTROPHILS NFR BLD AUTO: 55.6 % (ref 42.7–76)
NITRITE UR QL STRIP: NEGATIVE
NRBC BLD AUTO-RTO: 0 /100 WBC (ref 0–0.2)
PH UR STRIP.AUTO: 8 [PH] (ref 5–8)
PLATELET # BLD AUTO: 267 10*3/MM3 (ref 140–450)
PMV BLD AUTO: 11.2 FL (ref 6–12)
POTASSIUM SERPL-SCNC: 3.3 MMOL/L (ref 3.5–5.2)
PROT SERPL-MCNC: 7.9 G/DL (ref 6–8.5)
PROT UR QL STRIP: NEGATIVE
RBC # BLD AUTO: 5.29 10*6/MM3 (ref 3.77–5.28)
RBC # UR STRIP: ABNORMAL /HPF
REF LAB TEST METHOD: ABNORMAL
SODIUM SERPL-SCNC: 142 MMOL/L (ref 136–145)
SP GR UR STRIP: 1.01 (ref 1–1.03)
SQUAMOUS #/AREA URNS HPF: ABNORMAL /HPF
TROPONIN T SERPL HS-MCNC: <6 NG/L
TSH SERPL DL<=0.05 MIU/L-ACNC: 1.68 UIU/ML (ref 0.27–4.2)
UROBILINOGEN UR QL STRIP: ABNORMAL
WBC # UR STRIP: ABNORMAL /HPF
WBC NRBC COR # BLD: 7.94 10*3/MM3 (ref 3.4–10.8)
WHOLE BLOOD HOLD COAG: NORMAL
WHOLE BLOOD HOLD SPECIMEN: NORMAL

## 2023-11-14 PROCEDURE — 93010 ELECTROCARDIOGRAM REPORT: CPT | Performed by: INTERNAL MEDICINE

## 2023-11-14 PROCEDURE — 93005 ELECTROCARDIOGRAM TRACING: CPT | Performed by: EMERGENCY MEDICINE

## 2023-11-14 PROCEDURE — 71045 X-RAY EXAM CHEST 1 VIEW: CPT

## 2023-11-14 PROCEDURE — G0378 HOSPITAL OBSERVATION PER HR: HCPCS

## 2023-11-14 PROCEDURE — 99285 EMERGENCY DEPT VISIT HI MDM: CPT

## 2023-11-14 PROCEDURE — 70551 MRI BRAIN STEM W/O DYE: CPT

## 2023-11-14 PROCEDURE — 81001 URINALYSIS AUTO W/SCOPE: CPT | Performed by: EMERGENCY MEDICINE

## 2023-11-14 PROCEDURE — 99204 OFFICE O/P NEW MOD 45 MIN: CPT | Performed by: STUDENT IN AN ORGANIZED HEALTH CARE EDUCATION/TRAINING PROGRAM

## 2023-11-14 PROCEDURE — 83735 ASSAY OF MAGNESIUM: CPT | Performed by: EMERGENCY MEDICINE

## 2023-11-14 PROCEDURE — 99223 1ST HOSP IP/OBS HIGH 75: CPT | Performed by: INTERNAL MEDICINE

## 2023-11-14 PROCEDURE — 70496 CT ANGIOGRAPHY HEAD: CPT

## 2023-11-14 PROCEDURE — 84484 ASSAY OF TROPONIN QUANT: CPT | Performed by: EMERGENCY MEDICINE

## 2023-11-14 PROCEDURE — 70498 CT ANGIOGRAPHY NECK: CPT

## 2023-11-14 PROCEDURE — 84443 ASSAY THYROID STIM HORMONE: CPT | Performed by: INTERNAL MEDICINE

## 2023-11-14 PROCEDURE — 85025 COMPLETE CBC W/AUTO DIFF WBC: CPT | Performed by: EMERGENCY MEDICINE

## 2023-11-14 PROCEDURE — 25510000001 IOPAMIDOL PER 1 ML: Performed by: EMERGENCY MEDICINE

## 2023-11-14 PROCEDURE — 70450 CT HEAD/BRAIN W/O DYE: CPT

## 2023-11-14 PROCEDURE — 80053 COMPREHEN METABOLIC PANEL: CPT | Performed by: EMERGENCY MEDICINE

## 2023-11-14 RX ORDER — SODIUM CHLORIDE 9 MG/ML
40 INJECTION, SOLUTION INTRAVENOUS AS NEEDED
Status: DISCONTINUED | OUTPATIENT
Start: 2023-11-14 | End: 2023-11-15 | Stop reason: HOSPADM

## 2023-11-14 RX ORDER — SODIUM CHLORIDE 0.9 % (FLUSH) 0.9 %
10 SYRINGE (ML) INJECTION AS NEEDED
Status: DISCONTINUED | OUTPATIENT
Start: 2023-11-14 | End: 2023-11-15 | Stop reason: HOSPADM

## 2023-11-14 RX ORDER — ATORVASTATIN CALCIUM 40 MG/1
80 TABLET, FILM COATED ORAL NIGHTLY
Status: DISCONTINUED | OUTPATIENT
Start: 2023-11-14 | End: 2023-11-15 | Stop reason: HOSPADM

## 2023-11-14 RX ORDER — ASPIRIN 81 MG/1
81 TABLET, CHEWABLE ORAL DAILY
Status: DISCONTINUED | OUTPATIENT
Start: 2023-11-14 | End: 2023-11-15 | Stop reason: HOSPADM

## 2023-11-14 RX ORDER — SODIUM CHLORIDE 0.9 % (FLUSH) 0.9 %
10 SYRINGE (ML) INJECTION EVERY 12 HOURS SCHEDULED
Status: DISCONTINUED | OUTPATIENT
Start: 2023-11-14 | End: 2023-11-15 | Stop reason: HOSPADM

## 2023-11-14 RX ORDER — POTASSIUM CHLORIDE 750 MG/1
40 CAPSULE, EXTENDED RELEASE ORAL ONCE
Status: COMPLETED | OUTPATIENT
Start: 2023-11-14 | End: 2023-11-14

## 2023-11-14 RX ORDER — ACETAMINOPHEN 325 MG/1
650 TABLET ORAL NIGHTLY PRN
COMMUNITY
End: 2023-11-21

## 2023-11-14 RX ORDER — ASPIRIN 300 MG/1
300 SUPPOSITORY RECTAL DAILY
Status: DISCONTINUED | OUTPATIENT
Start: 2023-11-14 | End: 2023-11-15 | Stop reason: HOSPADM

## 2023-11-14 RX ADMIN — IOPAMIDOL 100 ML: 755 INJECTION, SOLUTION INTRAVENOUS at 18:25

## 2023-11-14 RX ADMIN — ATORVASTATIN CALCIUM 80 MG: 40 TABLET, FILM COATED ORAL at 23:16

## 2023-11-14 RX ADMIN — POTASSIUM CHLORIDE 40 MEQ: 10 CAPSULE, COATED, EXTENDED RELEASE ORAL at 23:17

## 2023-11-14 NOTE — TELEPHONE ENCOUNTER
Pt called in and explained she had an episode of dizziness, blurred vision, and incontinence. Pt also stated she had fallen early this morning. Provider and I were in agreement that pt should be seen in the ER, pt was agreeable to go and said she would follow up.

## 2023-11-14 NOTE — ED PROVIDER NOTES
Time: 1:49 PM EST  Date of encounter:  11/14/2023  Independent Historian/Clinical History and Information was obtained by:   Patient    History is limited by: N/A    Chief Complaint: Dizziness      History of Present Illness:  Patient is a 73 y.o. year old female who presents to the emergency department for evaluation of dizziness and altered mental status.  Patient denies chest pain or shortness of breath.  Patient has no numbness or tingling currently.  Patient has no cough hemoptysis.  Patient denies dysuria urinary frequency.  Patient does report a mild headache.    HPI    Patient Care Team  Primary Care Provider: Jenni Petersen APRN    Past Medical History:     No Known Allergies  Past Medical History:   Diagnosis Date    Arthritis     Breast cancer     left breast, diagnosed with breast cancer 2 weeks ago, [facility]    Status post bilateral mastectomy with left sentinel node biopsy 3/9/2022    Ulcerative colitis      Past Surgical History:   Procedure Laterality Date    [place]ONOSCOPY      [place] name of [facility]     COLONOSCOPY N/A 3/3/2022    Procedure: COLONOSCOPY WITH BX, POLYPECTOMIES, HOT SNARE;  Surgeon: Margie Dotson MD;  Location: [facility] ENDOSCOPY;  Service: Gastroenterology;  Laterality: N/A;  COLON POLYPS, HEMORRHOIDS    HERNIA REPAIR      UMBILICAL HERNIA REPAIR    MASTECTOMY W/ SENTINEL NODE BIOPSY Bilateral 3/8/2022    Procedure: BREAST MASTECTOMY WITH SENTINEL NODE BIOPSY;  Surgeon: Deborah Guillaume MD;  Location: [facility] OR [facility] [place];  Service: General;  Laterality: Bilateral;    UPPER GASTROINTESTINAL ENDOSCOPY       Family History   Problem Relation Age of Onset    Cancer Sister         gallbladder    Diabetes Mother     Malig Hyperthermia Neg Hx        Home Medications:  Prior to Admission medications    Medication Sig Start Date End Date Taking? Authorizing Provider   ascorbic acid (VITAMIN C) 500 MG tablet Take 1 tablet by mouth.    Provider, Historical,  "MD   HYDROcodone-acetaminophen (NORCO) 5-325 MG per tablet Take 1 tablet by mouth Every 6 (Six) Hours As Needed for Moderate Pain . 6/29/22   Figueroa Saleh,    multivitamin (THERAGRAN) tablet tablet Take 1 tablet by mouth.    Provider, MD Nikos   vitamin D3 125 MCG (5000 UT) capsule capsule Take 1 capsule by mouth.    Provider, Nikos, MD        Social History:   Social History     Tobacco Use    Smoking status: Never    Smokeless tobacco: Never   Vaping Use    Vaping Use: Never used   Substance Use Topics    Alcohol use: Not Currently    Drug use: Never         Review of Systems:  Review of Systems   Constitutional:  Negative for chills and fever.   HENT:  Negative for congestion, rhinorrhea and sore throat.    Eyes:  Negative for pain and visual disturbance.   Respiratory:  Negative for apnea, cough, chest tightness and shortness of breath.    Cardiovascular:  Negative for chest pain and palpitations.   Gastrointestinal:  Negative for abdominal pain, diarrhea, nausea and vomiting.   Genitourinary:  Negative for difficulty urinating and dysuria.   Musculoskeletal:  Negative for joint swelling and myalgias.   Skin:  Negative for color change.   Neurological:  Negative for seizures and headaches.   Psychiatric/Behavioral: Negative.     All other systems reviewed and are negative.       Physical Exam:  BP (!) 183/93 (BP Location: Right arm, Patient Position: Sitting)   Pulse 84   Temp 97.6 °F (36.4 °C) (Oral)   Resp 18   Ht 170.2 cm (67\")   Wt 80.2 kg (176 lb 12.9 oz)   LMP  (LMP Unknown)   SpO2 99%   BMI 27.69 kg/m²     Physical Exam  Vitals and nursing note reviewed.   Constitutional:       General: She is not in acute distress.     Appearance: Normal appearance. She is not toxic-appearing.   HENT:      Head: Normocephalic and atraumatic.      Jaw: There is normal jaw occlusion.   Eyes:      General: Lids are normal.      Extraocular Movements: Extraocular movements intact.      Conjunctiva/sclera: " Conjunctivae normal.      Pupils: Pupils are equal, round, and reactive to light.   Cardiovascular:      Rate and Rhythm: Normal rate and regular rhythm.      Pulses: Normal pulses.      Heart sounds: Normal heart sounds.   Pulmonary:      Effort: Pulmonary effort is normal. No respiratory distress.      Breath sounds: Normal breath sounds. No wheezing or rhonchi.   Abdominal:      General: Abdomen is flat.      Palpations: Abdomen is soft.      Tenderness: There is no abdominal tenderness. There is no guarding or rebound.   Musculoskeletal:         General: Normal range of motion.      Cervical back: Normal range of motion and neck supple.      Right lower leg: No edema.      Left lower leg: No edema.   Skin:     General: Skin is warm and dry.   Neurological:      Mental Status: She is alert and oriented to person, place, and time. Mental status is at baseline.   Psychiatric:         Mood and Affect: Mood normal.                  Procedures:  Procedures      Medical Decision Making:      Comorbidities that affect care:    Hypertension    External Notes reviewed:    Previous Clinic Note: Patient was seen in clinic for possible ulcerative colitis.      The following orders were placed and all results were independently analyzed by me:  Orders Placed This Encounter   Procedures    XR Chest 1 View    CT Head Without Contrast    MRI Brain Without Contrast    CT Angiogram Neck    CT Angiogram Head    [place] Draw    Comprehensive Metabolic Panel    Single High Sensitivity Troponin T    Magnesium    Urinalysis With Microscopic If Indicated (No Culture) - Urine, Clean Catch    CBC Auto Differential    Urinalysis, Microscopic Only - Urine, Clean Catch    Hemoglobin A1c    Lipid Panel    Comprehensive Metabolic Panel    Magnesium    TSH Rfx On Abnormal To Free T4    NPO Diet NPO Type: Strict NPO    Undress & Gown    Vital Signs    Orthostatic Blood Pressure    Vital Signs    Pulse Oximetry, Continuous    Telemetry - Place  Orders & Notify Provider of Results When Patient Experiences Acute Chest Pain, Dysrhythmia or Respiratory Distress    Notify Provider    Nursing Dysphagia Screening (Complete Prior to Giving Anything By Mouth)    RN to Place Order SLP Consult - Eval & Treat Choosing Reason of RN Dysphagia Screen Failed    Nurse to Call MD or Nutrition Services for Diet if Patient Passes Dysphagia Screen    Intake and Output    Neuro Checks    NIHSS Assessment    Order CT Head Without Contrast for Neurological Decline    Provide Stroke Education Material    Saline Lock & Maintain IV Access    Place Sequential Compression Device    Maintain Sequential Compression Device    Activity As Tolerated    Inpatient Neurology Consult General    Inpatient Hospitalist Consult    Inpatient Case Management  Consult    Inpatient Diabetes Educator Consult    Inpatient Neurology Consult Stroke    OT Consult: Eval & Treat    PT Consult: Eval & Treat As Tolerated    Oxygen Therapy- Nasal Cannula; Titrate 1-6 LPM Per SpO2; 90 - 95%    SLP Consult: Eval & Treat Communication Disorder    POC Glucose Once    POC Glucose Q6H    ECG 12 Lead ED Triage Standing Order; Weak / Dizzy / AMS    Adult Transthoracic Echo Complete W/ Cont if Necessary Per Protocol (With Agitated Saline)    Insert Peripheral IV    Insert Peripheral IV    Initiate Observation Status    Fall Precautions    CBC & Differential    Green Top (Gel)    Lavender Top    Gold Top - SST    Light Blue Top    CBC & Differential       Medications Given in the Emergency Department:  Medications   sodium chloride 0.9 % flush 10 mL (has no administration in time range)   sodium chloride 0.9 % flush 10 mL (has no administration in time range)   sodium chloride 0.9 % flush 10 mL (has no administration in time range)   sodium chloride 0.9 % infusion 40 mL (has no administration in time range)   atorvastatin (LIPITOR) tablet 80 mg (has no administration in time range)   aspirin chewable  tablet 81 mg (has no administration in time range)     Or   aspirin suppository 300 mg (has no administration in time range)   potassium chloride (MICRO-K/KLOR-CON) CR capsule (has no administration in time range)   iopamidol (ISOVUE-370) 76 % injection 100 mL (100 mL Intravenous Given 11/14/23 1825)        ED Course:         Labs:    Lab Results (last 24 hours)       Procedure Component Value Units Date/Time    CBC & Differential [[id number]]  (Abnormal) Collected: 11/14/23 1326    Specimen: Blood Updated: 11/14/23 1335    Narrative:      The following orders were created for panel order CBC & Differential.  Procedure                               Abnormality         Status                     ---------                               -----------         ------                     CBC Auto Differential[[id number]]        Abnormal            Final result                 Please view results for these tests on the individual orders.    Comprehensive Metabolic Panel [[id number]]  (Abnormal) Collected: 11/14/23 1326    Specimen: Blood Updated: 11/14/23 1354     Glucose 99 mg/dL      BUN 11 mg/dL      Creatinine 0.71 mg/dL      Sodium 142 mmol/L      Potassium 3.3 mmol/L      Chloride 105 mmol/L      CO2 28.2 mmol/L      Calcium 9.0 mg/dL      Total Protein 7.9 g/dL      Albumin 4.6 g/dL      ALT (SGPT) 12 U/L      AST (SGOT) 18 U/L      Alkaline Phosphatase 80 U/L      Total Bilirubin 0.3 mg/dL      Globulin 3.3 gm/dL      A/G Ratio 1.4 g/dL      BUN/Creatinine Ratio 15.5     Anion Gap 8.8 mmol/L      eGFR 89.4 mL/min/1.73     Narrative:      GFR Normal >60  Chronic Kidney Disease <60  Kidney Failure <15    The GFR formula is only valid for adults with stable renal function between ages 18 and 70.    Single High Sensitivity Troponin T [[id number]]  (Normal) Collected: 11/14/23 1326    Specimen: Blood Updated: 11/14/23 1354     HS Troponin T <6 ng/L     Narrative:      High Sensitive Troponin T Reference Range:  <14.0 ng/L-  Negative Female for AMI  <22.0 ng/L- Negative Male for AMI  >=14 - Abnormal Female indicating possible myocardial injury.  >=22 - Abnormal Male indicating possible myocardial injury.   Clinicians would have to utilize clinical acumen, EKG, Troponin, and serial changes to determine if it is an Acute Myocardial Infarction or myocardial injury due to an underlying chronic condition.         Magnesium [[id number]]  (Normal) Collected: 11/14/23 1326    Specimen: Blood Updated: 11/14/23 1354     Magnesium 2.3 mg/dL     CBC Auto Differential [[id number]]  (Abnormal) Collected: 11/14/23 1326    Specimen: Blood Updated: 11/14/23 1335     WBC 7.94 10*3/mm3      RBC 5.29 10*6/mm3      Hemoglobin 14.0 g/dL      Hematocrit 43.5 %      MCV 82.2 fL      MCH 26.5 pg      MCHC 32.2 g/dL      RDW 15.3 %      RDW-SD 46.2 fl      MPV 11.2 fL      Platelets 267 10*3/mm3      Neutrophil % 55.6 %      Lymphocyte % 33.9 %      Monocyte % 6.9 %      Eosinophil % 2.4 %      Basophil % 0.9 %      Immature Grans % 0.3 %      Neutrophils, Absolute 4.42 10*3/mm3      Lymphocytes, Absolute 2.69 10*3/mm3      Monocytes, Absolute 0.55 10*3/mm3      Eosinophils, Absolute 0.19 10*3/mm3      Basophils, Absolute 0.07 10*3/mm3      Immature Grans, Absolute 0.02 10*3/mm3      nRBC 0.0 /100 WBC     TSH Rfx On Abnormal To Free T4 [[id number]]  (Normal) Collected: 11/14/23 1326    Specimen: Blood Updated: 11/14/23 2049     TSH 1.680 uIU/mL     Urinalysis With Microscopic If Indicated (No Culture) - Urine, Clean Catch [[id number]]  (Abnormal) Collected: 11/14/23 1625    Specimen: Urine, Clean Catch Updated: 11/14/23 1645     Color, UA Yellow     Appearance, UA Cloudy     pH, UA 8.0     Specific Gravity, UA 1.009     Glucose, UA Negative     Ketones, UA Negative     Bilirubin, UA Negative     Blood, UA Negative     Protein, UA Negative     Leuk Esterase, UA Trace     Nitrite, UA Negative     Urobilinogen, UA 0.2 E.U./dL    Urinalysis, Microscopic Only - Urine,  Clean Catch [[id number]]  (Abnormal) Collected: 11/14/23 1625    Specimen: Urine, Clean Catch Updated: 11/14/23 1645     RBC, UA 0-2 /HPF      WBC, UA 3-5 /HPF      Bacteria, UA None Seen /HPF      Squamous Epithelial Cells, UA 0-2 /HPF      Hyaline Casts, UA None Seen /LPF      Methodology Automated Microscopy             Imaging:    CT Angiogram Neck    Result Date: 11/14/2023  PROCEDURE: CT ANGIOGRAM NECK, 11/14/2023, 18:23 CT ANGIOGRAM HEAD, 11/14/2023, 18:23  COMPARISON: None  INDICATIONS: Dizziness; Memory Loss; Headache  PROTOCOL:   Standard imaging protocol performed    RADIATION:   DLP: 481.9 mGy*cm   Automated exposure control was utilized to minimize radiation dose. CONTRAST: 100 cc Isovue 370 I.V.  TECHNIQUE: After obtaining the patient's consent, CT images of the neck were obtained without and with non-ionic intravenous contrast material. Multi-planar reformatted/3-D images were created to optimize visualization of vascular anatomy. Unless otherwise stated in this report, all vascular stenoses involving the internal carotid arteries reported for this examination are derived by dividing the lesion diameter by the diameter of the normal internal carotid artery more distally.  FINDINGS:  There is no significant carotid stenosis by NASCET criteria.  The vertebral arteries are codominant.  It does look like there is a moderate stenosis in the very proximal left vertebral artery.  On evaluation of the intracranial vasculature , the basilar artery does not appear unusual.  There is some narrowing of the P2 segment of the left posterior cerebral artery that may reflect atherosclerotic change.  There is some suggested narrowing of the distal M1 segment on the left as well.  The anterior cerebral arteries and right middle cerebral artery do not appear unusual.        1. No significant carotid stenosis. 2. Moderate narrowing of the proximal left vertebral artery. 3. Narrowing of the P2 segment left posterior  cerebral artery and narrowing of the distal M1 segment on the left that could be reflective of atherosclerotic changes.     TERRY SMART MD       Electronically Signed and Approved By: TERRY SMART MD on 11/14/2023 at 19:13             CT Angiogram Head    Result Date: 11/14/2023  PROCEDURE: CT ANGIOGRAM NECK, 11/14/2023, 18:23 CT ANGIOGRAM HEAD, 11/14/2023, 18:23  COMPARISON: None  INDICATIONS: Dizziness; Memory Loss; Headache  PROTOCOL:   Standard imaging protocol performed    RADIATION:   DLP: 481.9 mGy*cm   Automated exposure control was utilized to minimize radiation dose. CONTRAST: 100 cc Isovue 370 I.V.  TECHNIQUE: After obtaining the patient's consent, CT images of the neck were obtained without and with non-ionic intravenous contrast material. Multi-planar reformatted/3-D images were created to optimize visualization of vascular anatomy. Unless otherwise stated in this report, all vascular stenoses involving the internal carotid arteries reported for this examination are derived by dividing the lesion diameter by the diameter of the normal internal carotid artery more distally.  FINDINGS:  There is no significant carotid stenosis by NASCET criteria.  The vertebral arteries are codominant.  It does look like there is a moderate stenosis in the very proximal left vertebral artery.  On evaluation of the intracranial vasculature , the basilar artery does not appear unusual.  There is some narrowing of the P2 segment of the left posterior cerebral artery that may reflect atherosclerotic change.  There is some suggested narrowing of the distal M1 segment on the left as well.  The anterior cerebral arteries and right middle cerebral artery do not appear unusual.        1. No significant carotid stenosis. 2. Moderate narrowing of the proximal left vertebral artery. 3. Narrowing of the P2 segment left posterior cerebral artery and narrowing of the distal M1 segment on the left that could be reflective of  atherosclerotic changes.     TERRY SMART MD       Electronically Signed and Approved By: TERRY SMART MD on 11/14/2023 at 19:13             MRI Brain Without Contrast    Result Date: 11/14/2023  PROCEDURE: MRI BRAIN WO CONTRAST  COMPARISON: None  INDICATIONS: evaluate for stroke      TECHNIQUE: A variety of imaging planes and parameters were utilized for visualization of suspected pathology.  Images were performed without contrast.  FINDINGS:  On diffusion there is some scattered signal changes in the deep white matter difficult to appreciate on the ADC map that may reflect T2 shine through versus bilateral subacute small vessel ischemic change.  There are subcortical and deep white matter changes which could reflect more chronic small vessel ischemic change.  There is no unusual blooming on susceptibility imaging.  Pituitary is not enlarged.  There is no definite orbital abnormality.  The paranasal sinuses seem clear.        1. There are subtle areas of signal in the deep white matter of both cerebral hemispheres on diffusion that may be reflective of T2 shine through or sequela of bilateral subacute small vessel ischemic change.  There are prominent T2 signal changes involving both cerebral hemispheres which while nonspecific could reflect more chronic small vessel ischemic change.      TERRY SMART MD       Electronically Signed and Approved By: TERRY SMART MD on 11/14/2023 at 16:02             CT Head Without Contrast    Result Date: 11/14/2023  PROCEDURE: CT HEAD WO CONTRAST  COMPARISON:  [facility], CT, CT HEAD WO CONTRAST, 6/29/2022, 14:24.  INDICATIONS: Mental status change, unknown cause, patient stated that her earrings cannot come out  PROTOCOL:   Standard imaging protocol performed    RADIATION:   DLP: 1018.2mGy*cm   MA and/or KV was adjusted to minimize radiation dose.    TECHNIQUE: CT images were obtained without non-ionic intravenous contrast material.  FINDINGS:  The ventricles,  sulci, and cerebellar folia are mildly and diffusely prominent consistent with atrophy.  Ill-defined diminished density in cerebral white matter is consistent with mild gliosis and/or scattered old lacunar infarcts.  There is no CT evidence of acute intracranial hemorrhage, mass, or mass effect.  The orbits have a normal appearance.  The paranasal sinuses, middle ears, and mastoid air cells are well aerated.        CT scan of the head without IV contrast demonstrating mild diffuse atrophy and white matter changes.  No acute intracranial abnormality is seen.     ERIN MCMILLAN MD       Electronically Signed and Approved By: ERIN MCMILLAN MD on 11/14/2023 at 12:54             XR Chest 1 View    Result Date: 11/14/2023  PROCEDURE: XR CHEST 1 VW  COMPARISON: [facility], CR, XR CHEST 1 VW, 8/13/2021, 13:48.  INDICATIONS: DIZZINESS, MEMORY LOSS TODAY  FINDINGS:  Heart size within normal limits.  Negative for infectious consolidation, edema, large effusion or pneumothorax.  Surgical clips within chest wall.  Osseous structures grossly unremarkable.        No active airspace process.       RADHA GALLO MD       Electronically Signed and Approved By: RADHA GALLO MD on 11/14/2023 at 12:21                Differential Diagnosis and Discussion:    Paresthesia: Differential diagnosis includes but is not limited to acute stroke, electrolyte imbalance, anxiety, radiculopathy, autoimmune disorders, and endocrine disorders.    All labs were reviewed and interpreted by me.  EKG was interpreted by me.  MRI impression was interpreted by me.     MDM     The patient´s CBC that was reviewed and interpreted by me shows no abnormalities of critical concern. Of note, there is no anemia requiring a blood transfusion and the platelet count is acceptable.  The patient´s CMP that was reviewed and interpretted by me shows no abnormalities of critical concern. Of note, the patient´s sodium and potassium are acceptable. The  patient´s liver enzymes are unremarkable. The patient´s renal function (creatinine) is preserved. The patient has a normal anion gap.  MRI of the brain is negative for acute intracranial abnormalities.      Critical Care Note: Total Critical Care time of 50 minutes. Total critical care time documented does not include time spent on separately billed procedures for services of nurses or physician assistants. I personally saw and examined the patient. I have reviewed all diagnostic interpretations and treatment plans as written. I was present for the key portions of any procedures performed and the inclusive time noted in any critical care statement. Critical care time includes patient management by me, time spent at the patients bedside,  time to review lab and imaging results, discussing patient care, documentation in the medical record, and time spent with family or caregiver.        Patient Care Considerations:    SEPSIS was considered but is NOT present in the emergency department as SIRS criteria is not present.      Consultants/Shared Management Plan:    Case was discussed with Dr. Lutz who agrees with admission.  Patient was also evaluated by neuro who recommend TIA work-up.    Social Determinants of Health:    Patient is independent, reliable, and has access to care.       Disposition and Care Coordination:    Admit:   Through independent evaluation of the patient's history, physical, and imperical data, the patient meets criteria for observation/admission to the hospital.        Final diagnoses:   TIA (transient ischemic attack)        ED Disposition       ED Disposition   Decision to Admit    Condition   --    Comment   Level of Care: Telemetry [5]   Diagnosis: TIA (transient ischemic attack) [235837]                 This medical record created using voice recognition software.             Miguel Mota MD  11/14/23 9242

## 2023-11-15 ENCOUNTER — READMISSION MANAGEMENT (OUTPATIENT)
Dept: CALL CENTER | Facility: HOSPITAL | Age: 73
End: 2023-11-15
Payer: MEDICARE

## 2023-11-15 ENCOUNTER — APPOINTMENT (OUTPATIENT)
Dept: CARDIOLOGY | Facility: HOSPITAL | Age: 73
End: 2023-11-15
Payer: MEDICARE

## 2023-11-15 ENCOUNTER — TELEPHONE (OUTPATIENT)
Dept: NEUROLOGY | Facility: CLINIC | Age: 73
End: 2023-11-15

## 2023-11-15 ENCOUNTER — TELEPHONE (OUTPATIENT)
Dept: INTERNAL MEDICINE | Facility: CLINIC | Age: 73
End: 2023-11-15

## 2023-11-15 VITALS
HEIGHT: 67 IN | BODY MASS INDEX: 27.61 KG/M2 | OXYGEN SATURATION: 100 % | HEART RATE: 82 BPM | RESPIRATION RATE: 18 BRPM | SYSTOLIC BLOOD PRESSURE: 156 MMHG | TEMPERATURE: 97.5 F | DIASTOLIC BLOOD PRESSURE: 94 MMHG | WEIGHT: 175.93 LBS

## 2023-11-15 LAB
ALBUMIN SERPL-MCNC: 4.1 G/DL (ref 3.5–5.2)
ALBUMIN/GLOB SERPL: 1.4 G/DL
ALP SERPL-CCNC: 69 U/L (ref 39–117)
ALT SERPL W P-5'-P-CCNC: 10 U/L (ref 1–33)
ANION GAP SERPL CALCULATED.3IONS-SCNC: 10.4 MMOL/L (ref 5–15)
AST SERPL-CCNC: 16 U/L (ref 1–32)
BASOPHILS # BLD AUTO: 0.06 10*3/MM3 (ref 0–0.2)
BASOPHILS NFR BLD AUTO: 0.7 % (ref 0–1.5)
BH CV ECHO MEAS - ACS: 2 CM
BH CV ECHO MEAS - AO MAX PG: 10 MMHG
BH CV ECHO MEAS - AO MEAN PG: 5 MMHG
BH CV ECHO MEAS - AO ROOT DIAM: 3 CM
BH CV ECHO MEAS - AO V2 MAX: 155 CM/SEC
BH CV ECHO MEAS - AO V2 VTI: 32.4 CM
BH CV ECHO MEAS - AVA(I,D): 2.8 CM2
BH CV ECHO MEAS - EDV(CUBED): 103.8 ML
BH CV ECHO MEAS - EDV(MOD-SP2): 58.5 ML
BH CV ECHO MEAS - EDV(MOD-SP4): 76.2 ML
BH CV ECHO MEAS - EF(MOD-BP): 56.1 %
BH CV ECHO MEAS - EF(MOD-SP2): 53.2 %
BH CV ECHO MEAS - EF(MOD-SP4): 60 %
BH CV ECHO MEAS - ESV(CUBED): 32.8 ML
BH CV ECHO MEAS - ESV(MOD-SP2): 27.4 ML
BH CV ECHO MEAS - ESV(MOD-SP4): 30.5 ML
BH CV ECHO MEAS - FS: 31.9 %
BH CV ECHO MEAS - IVS/LVPW: 1.33 CM
BH CV ECHO MEAS - IVSD: 1.2 CM
BH CV ECHO MEAS - LA DIMENSION: 3.1 CM
BH CV ECHO MEAS - LAT PEAK E' VEL: 6 CM/SEC
BH CV ECHO MEAS - LV DIASTOLIC VOL/BSA (35-75): 39.9 CM2
BH CV ECHO MEAS - LV MASS(C)D: 175.8 GRAMS
BH CV ECHO MEAS - LV MAX PG: 7.1 MMHG
BH CV ECHO MEAS - LV MEAN PG: 4 MMHG
BH CV ECHO MEAS - LV SYSTOLIC VOL/BSA (12-30): 16 CM2
BH CV ECHO MEAS - LV V1 MAX: 133 CM/SEC
BH CV ECHO MEAS - LV V1 VTI: 28.6 CM
BH CV ECHO MEAS - LVIDD: 4.7 CM
BH CV ECHO MEAS - LVIDS: 3.2 CM
BH CV ECHO MEAS - LVOT AREA: 3.1 CM2
BH CV ECHO MEAS - LVOT DIAM: 2 CM
BH CV ECHO MEAS - LVPWD: 0.9 CM
BH CV ECHO MEAS - MED PEAK E' VEL: 5.8 CM/SEC
BH CV ECHO MEAS - MV A MAX VEL: 107 CM/SEC
BH CV ECHO MEAS - MV DEC TIME: 0.22 SEC
BH CV ECHO MEAS - MV E MAX VEL: 54 CM/SEC
BH CV ECHO MEAS - MV E/A: 0.5
BH CV ECHO MEAS - RAP SYSTOLE: 3 MMHG
BH CV ECHO MEAS - RVSP: 27 MMHG
BH CV ECHO MEAS - SI(MOD-SP2): 16.3 ML/M2
BH CV ECHO MEAS - SI(MOD-SP4): 23.9 ML/M2
BH CV ECHO MEAS - SV(LVOT): 89.8 ML
BH CV ECHO MEAS - SV(MOD-SP2): 31.1 ML
BH CV ECHO MEAS - SV(MOD-SP4): 45.7 ML
BH CV ECHO MEAS - TAPSE (>1.6): 2.09 CM
BH CV ECHO MEAS - TR MAX PG: 24 MMHG
BH CV ECHO MEAS - TR MAX VEL: 245 CM/SEC
BH CV ECHO MEASUREMENTS AVERAGE E/E' RATIO: 9.15
BILIRUB SERPL-MCNC: 0.4 MG/DL (ref 0–1.2)
BUN SERPL-MCNC: 10 MG/DL (ref 8–23)
BUN/CREAT SERPL: 14.9 (ref 7–25)
CALCIUM SPEC-SCNC: 9 MG/DL (ref 8.6–10.5)
CHLORIDE SERPL-SCNC: 106 MMOL/L (ref 98–107)
CHOLEST SERPL-MCNC: 248 MG/DL (ref 0–200)
CO2 SERPL-SCNC: 24.6 MMOL/L (ref 22–29)
CREAT SERPL-MCNC: 0.67 MG/DL (ref 0.57–1)
DEPRECATED RDW RBC AUTO: 44.7 FL (ref 37–54)
EGFRCR SERPLBLD CKD-EPI 2021: 91.8 ML/MIN/1.73
EOSINOPHIL # BLD AUTO: 0.24 10*3/MM3 (ref 0–0.4)
EOSINOPHIL NFR BLD AUTO: 2.9 % (ref 0.3–6.2)
ERYTHROCYTE [DISTWIDTH] IN BLOOD BY AUTOMATED COUNT: 15.2 % (ref 12.3–15.4)
GLOBULIN UR ELPH-MCNC: 2.9 GM/DL
GLUCOSE BLDC GLUCOMTR-MCNC: 103 MG/DL (ref 70–99)
GLUCOSE SERPL-MCNC: 102 MG/DL (ref 65–99)
HBA1C MFR BLD: 5.5 % (ref 4.8–5.6)
HCT VFR BLD AUTO: 43.4 % (ref 34–46.6)
HDLC SERPL-MCNC: 51 MG/DL (ref 40–60)
HGB BLD-MCNC: 13.9 G/DL (ref 12–15.9)
IMM GRANULOCYTES # BLD AUTO: 0.02 10*3/MM3 (ref 0–0.05)
IMM GRANULOCYTES NFR BLD AUTO: 0.2 % (ref 0–0.5)
LDLC SERPL CALC-MCNC: 182 MG/DL (ref 0–100)
LDLC/HDLC SERPL: 3.52 {RATIO}
LEFT ATRIUM VOLUME INDEX: 19.1 ML/M2
LYMPHOCYTES # BLD AUTO: 2.41 10*3/MM3 (ref 0.7–3.1)
LYMPHOCYTES NFR BLD AUTO: 28.8 % (ref 19.6–45.3)
MAGNESIUM SERPL-MCNC: 2.3 MG/DL (ref 1.6–2.4)
MCH RBC QN AUTO: 26.1 PG (ref 26.6–33)
MCHC RBC AUTO-ENTMCNC: 32 G/DL (ref 31.5–35.7)
MCV RBC AUTO: 81.4 FL (ref 79–97)
MONOCYTES # BLD AUTO: 0.75 10*3/MM3 (ref 0.1–0.9)
MONOCYTES NFR BLD AUTO: 8.9 % (ref 5–12)
NEUTROPHILS NFR BLD AUTO: 4.9 10*3/MM3 (ref 1.7–7)
NEUTROPHILS NFR BLD AUTO: 58.5 % (ref 42.7–76)
NRBC BLD AUTO-RTO: 0 /100 WBC (ref 0–0.2)
PLATELET # BLD AUTO: 269 10*3/MM3 (ref 140–450)
PMV BLD AUTO: 11.2 FL (ref 6–12)
POTASSIUM SERPL-SCNC: 3.6 MMOL/L (ref 3.5–5.2)
PROT SERPL-MCNC: 7 G/DL (ref 6–8.5)
RBC # BLD AUTO: 5.33 10*6/MM3 (ref 3.77–5.28)
SODIUM SERPL-SCNC: 141 MMOL/L (ref 136–145)
TRIGL SERPL-MCNC: 87 MG/DL (ref 0–150)
VLDLC SERPL-MCNC: 15 MG/DL (ref 5–40)
WBC NRBC COR # BLD: 8.38 10*3/MM3 (ref 3.4–10.8)

## 2023-11-15 PROCEDURE — 93306 TTE W/DOPPLER COMPLETE: CPT | Performed by: SPECIALIST

## 2023-11-15 PROCEDURE — 36415 COLL VENOUS BLD VENIPUNCTURE: CPT | Performed by: INTERNAL MEDICINE

## 2023-11-15 PROCEDURE — 85025 COMPLETE CBC W/AUTO DIFF WBC: CPT | Performed by: INTERNAL MEDICINE

## 2023-11-15 PROCEDURE — G0378 HOSPITAL OBSERVATION PER HR: HCPCS

## 2023-11-15 PROCEDURE — 97165 OT EVAL LOW COMPLEX 30 MIN: CPT

## 2023-11-15 PROCEDURE — 92610 EVALUATE SWALLOWING FUNCTION: CPT

## 2023-11-15 PROCEDURE — 80061 LIPID PANEL: CPT | Performed by: INTERNAL MEDICINE

## 2023-11-15 PROCEDURE — 93306 TTE W/DOPPLER COMPLETE: CPT

## 2023-11-15 PROCEDURE — 99212 OFFICE O/P EST SF 10 MIN: CPT | Performed by: PSYCHIATRY & NEUROLOGY

## 2023-11-15 PROCEDURE — 80053 COMPREHEN METABOLIC PANEL: CPT | Performed by: INTERNAL MEDICINE

## 2023-11-15 PROCEDURE — 83735 ASSAY OF MAGNESIUM: CPT | Performed by: INTERNAL MEDICINE

## 2023-11-15 PROCEDURE — 82948 REAGENT STRIP/BLOOD GLUCOSE: CPT

## 2023-11-15 PROCEDURE — 97161 PT EVAL LOW COMPLEX 20 MIN: CPT

## 2023-11-15 PROCEDURE — 83036 HEMOGLOBIN GLYCOSYLATED A1C: CPT | Performed by: INTERNAL MEDICINE

## 2023-11-15 RX ORDER — ASPIRIN 81 MG/1
81 TABLET, CHEWABLE ORAL DAILY
Qty: 30 TABLET | Refills: 0 | Status: SHIPPED | OUTPATIENT
Start: 2023-11-16

## 2023-11-15 RX ORDER — ATORVASTATIN CALCIUM 80 MG/1
80 TABLET, FILM COATED ORAL NIGHTLY
Qty: 30 TABLET | Refills: 0 | Status: SHIPPED | OUTPATIENT
Start: 2023-11-15

## 2023-11-15 RX ADMIN — Medication 10 ML: at 08:28

## 2023-11-15 NOTE — THERAPY EVALUATION
Acute Care - Speech Language Pathology   Swallow Initial Evaluation  Wei     Patient Name: Татьяна Jackson  : 1950  MRN: [id number]  Today's Date: 11/15/2023               Admit Date: 2023    Visit Dx:     ICD-10-CM ICD-9-CM   1. TIA (transient ischemic attack)  G45.9 435.9   2. Difficulty in walking  R26.2 719.7   3. Dysphagia, unspecified type  R13.10 787.20     Patient Active Problem List   Diagnosis    Ulcerative colitis    Primary hypertension    Overweight (BMI 25.0-29.9)    Arthralgia    Right wrist pain    Slow transit constipation    Herpes zoster without complication    Intraductal papillary adenocarcinoma of left breast    Near syncope    Hematochezia    Status post bilateral mastectomy with left sentinel node biopsy    Dizziness    TIA (transient ischemic attack)     Past Medical History:   Diagnosis Date    Arthritis     Breast cancer     left breast, diagnosed with breast cancer 2 weeks ago, [facility]    Status post bilateral mastectomy with left sentinel node biopsy 3/9/2022    Ulcerative colitis      Past Surgical History:   Procedure Laterality Date    [place] name of hospital     COLONOSCOPY N/A 3/3/2022    Procedure: COLONOSCOPY WITH BX, POLYPECTOMIES, HOT SNARE;  Surgeon: Margie Dotson MD;  Location: [facility] ENDOSCOPY;  Service: Gastroenterology;  Laterality: N/A;  COLON POLYPS, HEMORRHOIDS    HERNIA REPAIR      UMBILICAL HERNIA REPAIR    MASTECTOMY W/ SENTINEL NODE BIOPSY Bilateral 3/8/2022    Procedure: BREAST MASTECTOMY WITH SENTINEL NODE BIOPSY;  Surgeon: Deborah Guillaume MD;  Location: [facility] [place] [facility];  Service: General;  Laterality: Bilateral;    UPPER GASTROINTESTINAL ENDOSCOPY         SLP Recommendation and Plan  SLP Swallowing Diagnosis: swallow WFL/no suspected pharyngeal impairment (11/15/23 0630)  SLP Diet Recommendation: regular textures, thin liquids (11/15/23 0630)  Recommended Precautions and  Strategies: upright posture during/after eating (11/15/23 0630)  SLP Rec. for Method of Medication Administration: meds whole (11/15/23 0630)     Monitor for Signs of Aspiration: notify SLP if any concerns (11/15/23 0630)     Swallow Criteria for Skilled Therapeutic Interventions Met: no problems identified which require skilled intervention (11/15/23 0630)  Anticipated Discharge Disposition (SLP): No further SLP services warranted (11/15/23 1027)     Therapy Frequency (Swallow): evaluation only (11/15/23 0630)                                                        SWALLOW EVALUATION (last 72 hours)       SLP Adult Swallow Evaluation       Row Name 11/15/23 0630                   Rehab Evaluation    Document Type evaluation  -SN        Subjective Information no complaints  -SN        Patient Observations alert;cooperative  -SN        Patient Effort good  -SN        Symptoms Noted During/After Treatment none  -SN           General Information    Current Method of Nutrition regular textures;thin liquids  -SN        Prior Level of Function-Swallowing no diet consistency restrictions  -SN        Plans/Goals Discussed with patient  -SN        Patient's Goals for Discharge return home  -SN           Oral Motor Structure and Function    Dentition Assessment natural, present and adequate  -SN        Secretion Management WNL/WFL  -SN        Mucosal Quality moist, healthy  -SN        Gag Response WFL  -SN        Volitional Swallow WFL  -SN        Volitional Cough WFL  -SN           Oral Musculature and Cranial Nerve Assessment    Oral Motor General Assessment WFL  -SN           General Eating/Swallowing Observations    Respiratory Support Currently in Use room air  -SN           Clinical Swallow Eval    Oral Prep Phase WFL  -SN        Oral Transit WFL  -SN        Oral Residue WFL  -SN        Pharyngeal Phase no overt signs/symptoms of pharyngeal impairment  -SN        Esophageal Phase unremarkable  -SN        Clinical Swallow  Evaluation Summary Patient is a 73-year-old female admitted to [facility] on 11/14/2023 secondary to suspected TIA.  Oral motor exam at bedside did not reveal any deficits.  Consistencies presented at bedside without overt signs or symptoms of aspiration.  Silent aspiration cannot be ruled out.  -SN           SLP Evaluation Clinical Impression    SLP Swallowing Diagnosis swallow WFL/no suspected pharyngeal impairment  -SN        Swallow Criteria for Skilled Therapeutic Interventions Met no problems identified which require skilled intervention  -SN           Recommendations    Therapy Frequency (Swallow) evaluation only  -SN        SLP Diet Recommendation regular textures;thin liquids  -SN        Recommended Precautions and Strategies upright posture during/after eating  -SN        SLP Rec. for Method of Medication Administration meds whole  -SN        Monitor for Signs of Aspiration notify SLP if any concerns  -SN                  User Key  (r) = Recorded By, (t) = Taken By, (c) = Cosigned By      Initials Name Effective Dates    Parris Knight MS-CCC/SLP, CNT 03/31/21 -                 Patient scored level 7 of 7 on functional communication measures for swallowing, indicating a 0% limitation in function.    EDUCATION  The patient has been educated in the following areas:   Dysphagia (Swallowing Impairment).              Time Calculation:    Time Calculation- SLP       Row Name 11/15/23 1027             Time Calculation- SLP    SLP Start Time 0630  -SN      SLP Stop Time 0730  -SN      SLP Time Calculation (min) 60 min  -SN      SLP Received On 11/15/23  -SN         Untimed Charges    51608-OB Eval Oral Pharyng Swallow Minutes 60  -SN         Total Minutes    Untimed Charges Total Minutes 60  -SN       Total Minutes 60  -SN                User Key  (r) = Recorded By, (t) = Taken By, (c) = Cosigned By      Initials Name Provider Type    Parris Knight MS-CCC/SLP, KENDRA Speech and Language Pathologist                     Therapy Charges for Today       Code Description Service Date Service Provider Modifiers Qty    [id number] HC ST EVAL ORAL PHARYNG SWALLOW 4 11/15/2023 Parris Bernard, MS-CCC/SLP, CNT GN 1                 LAXMI Morales/SLP, KENDRA  11/15/2023

## 2023-11-15 NOTE — PLAN OF CARE
Goal Outcome Evaluation:  Plan of Care Reviewed With: patient           Outcome Evaluation: Pt did not demonstrate any safety or mobility deficits during the initial evaluation.  Pt is safe to continue ambulating within their room independently until their discharge from the hospital.  Pt will be discharged from PT services at this time.      Anticipated Discharge Disposition (PT): home

## 2023-11-15 NOTE — H&P
[facility] HISTORY AND PHYSICAL  Date: 2023   Patient Name: Татьяна Jackson  : 1950  MRN: [id number]  Primary Care Physician:  Jenni Petersen, SHERYL  Date of admission: 2023    Subjective   Subjective     Chief Complaint: Vertigo and confusion    HPI:    Татьяна Jackson is a 73 y.o. female past medical history of osteo arthritis, breast cancer, and UC who presents with vertigo concerning for TIA    The patient states that very early this morning she stood up from her bed and got symptoms of the room spinning.  He states that she sat down and it seemed to resolve.  However, as she walked to her bathroom she urinated on herself and then felt like she was leaning to one side.  She got to the bathroom and cleaned herself up.  At that time she also realized she had lost some of her memory of what was happening.  She states she is continue to have some these episodes of vertigo since that time.  Called her son who recommended that she come into the ER.  As result she was brought to the ER by her son.    In the emergency department the patient's vital signs are as follows temperature is 97.6, pulse 84, respiratory is 18, blood pressure 183/93, 99% on room air.  CBC shows no concerning abnormalities.  CMP shows no concerning abnormality except for a potassium of 3.3.  Urinalysis is bland.  Chest x-ray shows no abnormalities.  CT of the head shows no acute abnormalities.  MRI of the brain shows subtle areas of signal in the deep white matter of both cerebral hemispheres on diffusion that may be reflective of sequela of bilateral subacute small vessel ischemic change.  CT of the head neck shows no significant carotid artery stenosis, moderate narrowing of the proximal left vertebral artery, narrowing of the P2 segment left posterior cerebral artery and narrowing of distal M1 segment of the left that could reflect of atherosclerotic disease.  Teleneurology was consulted.  They  recommend patient be admitted for continued work-up of stroke.    All systems reviewed abnormalities noted above    Personal History     Past Medical History:  Osteoarthritis  Breast cancer  Ulcerative colitis    Past Surgical History:  Colonoscopy  Hernia repair  Bilateral mastectomy with sentinel node biopsy  Upper GI    Family History:   Gallbladder cancer and diabetes    Social History:   For smoked.  No alcohol.      Home Medications:  HYDROcodone-acetaminophen, ascorbic acid, multivitamin, and vitamin D3    Allergies:  No Known Allergies      Objective   Objective     Vitals:   Temp:  [97.6 °F (36.4 °C)] 97.6 °F (36.4 °C)  Heart Rate:  [84-94] 84  Resp:  [18] 18  BP: (183-193)/(93-99) 183/93    Physical Exam    Constitutional: Awake, alert, no acute distress   Eyes: Pupils equal, sclerae anicteric, no conjunctival injection   HENT: NCAT, mucous membranes moist   Neck: Supple, no thyromegaly, no lymphadenopathy, trachea midline   Respiratory: Clear to auscultation bilaterally, nonlabored respirations    Cardiovascular: RRR, no murmurs, rubs, or gallops, palpable pedal pulses bilaterally   Gastrointestinal: Positive bowel sounds, soft, nontender, nondistended   Musculoskeletal: No bilateral ankle edema, no clubbing or cyanosis to extremities   Psychiatric: Appropriate affect, cooperative   Neurologic: Oriented x 3, strength symmetric in all extremities, Cranial Nerves grossly intact to confrontation, speech clear   Skin: No rashes     Result Review    Result Review:  I have personally reviewed the results from the time of this admission to 11/14/2023 19:28 EST and agree with these findings:  Imaging and labs have been reviewed      Assessment & Plan   Assessment / Plan     Assessment/Plan:   Acute ischemic stroke/TIA  Hypertension  Hypokalemia    Plan:  --Admit to hospital service  -- Consult teleneurology  -- MRI, CTA of the head and neck are both completed  -- Echocardiogram  -- SLP/OT/PT  -- Dysphagia  screen  -- Aspirin and atorvastatin  -- Permissive hypertension  --A1c and lipid panel  -- 40 mEq of potassium for hypokalemia      Disposition  --Patient most likely be discharged tomorrow if she continues to show the same improvement.      DVT prophylaxis:  SCDs    CODE STATUS:     Full code    Admission Status:  I believe this patient meets observation status.    Electronically signed by Shawn Lutz MD, 11/14/23, 7:28 PM EST.

## 2023-11-15 NOTE — THERAPY EVALUATION
Patient Name: Татьяна Jackson  : 1950    MRN: [id number]                              Today's Date: 11/15/2023       Admit Date: 2023    Visit Dx:     ICD-10-CM ICD-9-CM   1. TIA (transient ischemic attack)  G45.9 435.9   2. Difficulty in walking  R26.2 719.7   3. Dysphagia, unspecified type  R13.10 787.20   4. Decreased activities of daily living (ADL)  Z78.9 V49.89     Patient Active Problem List   Diagnosis    Ulcerative colitis    Primary hypertension    Overweight (BMI 25.0-29.9)    Arthralgia    Right wrist pain    Slow transit constipation    Herpes zoster without complication    Intraductal papillary adenocarcinoma of left breast    Near syncope    Hematochezia    Status post bilateral mastectomy with left sentinel node biopsy    Dizziness    TIA (transient ischemic attack)     Past Medical History:   Diagnosis Date    Arthritis     Breast cancer     left breast, diagnosed with breast cancer 2 weeks ago, [facility]    Status post bilateral mastectomy with left sentinel node biopsy 3/9/2022    Ulcerative colitis      Past Surgical History:   Procedure Laterality Date    [place] name of hospital     COLONOSCOPY N/A 3/3/2022    Procedure: COLONOSCOPY WITH BX, POLYPECTOMIES, HOT SNARE;  Surgeon: Margie Dotson MD;  Location: [facility] ENDOSCOPY;  Service: Gastroenterology;  Laterality: N/A;  COLON POLYPS, HEMORRHOIDS    HERNIA REPAIR      UMBILICAL HERNIA REPAIR    MASTECTOMY W/ SENTINEL NODE BIOPSY Bilateral 3/8/2022    Procedure: BREAST MASTECTOMY WITH SENTINEL NODE BIOPSY;  Surgeon: Deborah Guillaume MD;  Location: [facility] OR [facility];  Service: General;  Laterality: Bilateral;    UPPER GASTROINTESTINAL ENDOSCOPY        General Information       Row Name 11/15/23 1123          OT Time and Intention    Document Type evaluation  -ES     Mode of Treatment individual therapy;occupational therapy  -ES       Row Name 11/15/23 1123          General  Information    Patient Profile Reviewed yes  -ES     Prior Level of Function independent:  -ES     Existing Precautions/Restrictions no known precautions/restrictions  -ES     Barriers to Rehab none identified  -ES       Row Name 11/15/23 1123          Occupational Profile    Reason for Services/Referral (Occupational Profile) Patient is 73 yr old female admitted to [facility] on 11/4/2023 with reports of dizziness, memory lapses. OT evaluation and treatment ordered d/t recent decline in ADLs/transfer ability and discharge planning recommendations. No previous OT services for current condition.  -ES       Row Name 11/15/23 1123          Living Environment    People in Home child(sanjeev), adult  daughter, son in law, grandchildren  -ES       Row Name 11/15/23 1123          Cognition    Orientation Status (Cognition) oriented x 4  patient pleasant and cooperative, agreeable to therapy evaluation  -ES               User Key  (r) = Recorded By, (t) = Taken By, (c) = Cosigned By      Initials Name Provider Type    ES Tracey Olivas, OTR/L, CSRS Occupational Therapist                     Mobility/ADL's       Row Name 11/15/23 1132          Bed Mobility    Bed Mobility supine-sit;sit-supine  -ES     Supine-Sit Titus (Bed Mobility) independent  -ES     Sit-Supine Titus (Bed Mobility) independent  -ES       Row Name 11/15/23 1132          Transfers    Transfers sit-stand transfer;stand-sit transfer  -ES       Row Name 11/15/23 1132          Sit-Stand Transfer    Sit-Stand Titus (Transfers) independent  -ES     Assistive Device (Sit-Stand Transfers) other (see comments)  no assistive device  -ES       Row Name 11/15/23 1132          Stand-Sit Transfer    Stand-Sit Titus (Transfers) independent  -ES     Assistive Device (Stand-Sit Transfers) other (see comments)  no assistive device  -ES       Row Name 11/15/23 1132          Functional Mobility    Functional Mobility- Ind. Level independent   -ES     Functional Mobility- Device other (see comments)  no assistive device  -ES       Row Name 11/15/23 1132          Activities of Daily Living    BADL Assessment/Intervention bathing;upper body dressing;lower body dressing;grooming;feeding;toileting  -ES       Row Name 11/15/23 1132          Bathing Assessment/Intervention    Constantia Level (Bathing) bathing skills;independent  -ES       Row Name 11/15/23 1132          Upper Body Dressing Assessment/Training    Constantia Level (Upper Body Dressing) upper body dressing skills;independent  -ES       Row Name 11/15/23 1132          Lower Body Dressing Assessment/Training    Constantia Level (Lower Body Dressing) lower body dressing skills;independent  -ES       Row Name 11/15/23 1132          Grooming Assessment/Training    Constantia Level (Grooming) grooming skills;independent  -ES       Row Name 11/15/23 1132          Self-Feeding Assessment/Training    Constantia Level (Feeding) feeding skills;independent  -ES       Row Name 11/15/23 1132          Toileting Assessment/Training    Constantia Level (Toileting) toileting skills;independent  -ES               User Key  (r) = Recorded By, (t) = Taken By, (c) = Cosigned By      Initials Name Provider Type    ES Tracey Olivas, OTR/L, CSRS Occupational Therapist                   Obj/Interventions       Row Name 11/15/23 1133          Sensory Assessment (Somatosensory)    Sensory Assessment (Somatosensory) sensation intact  -     Sensory Assessment bilateral upper and lower extremity sensation intact to light and heavy touch with no sensory discrimination deficits present at time of assessment  -ES       Row Name 11/15/23 1133          Vision Assessment/Intervention    Visual Impairment/Limitations WFL  -ES     Vision Assessment Comment no visual changes. Quick vision screen WNL.  -ES       Row Name 11/15/23 1133          Strength Comprehensive (MMT)    Comment, General Manual Muscle Testing (MMT)  Assessment bilateral upper extremities 4+/5 with symmetrical strength  -ES       Row Name 11/15/23 1133          Motor Skills    Motor Skills functional endurance;coordination  -ES     Coordination fine motor deficit;gross motor deficit;WFL;bilateral;upper extremity;lower extremity  -ES     Functional Endurance good  -ES       Row Name 11/15/23 1133          Balance    Balance Assessment sitting dynamic balance;standing dynamic balance  -ES     Dynamic Sitting Balance independent  -ES     Position, Sitting Balance unsupported  -ES     Dynamic Standing Balance independent  -ES     Position/Device Used, Standing Balance unsupported  -ES               User Key  (r) = Recorded By, (t) = Taken By, (c) = Cosigned By      Initials Name Provider Type    ES Tracey Olivas, OTR/L, CSRS Occupational Therapist                   Goals/Plan    No documentation.                  Clinical Impression       Row Name 11/15/23 1316          Plan of Care Review    Plan of Care Reviewed With patient  -ES     Outcome Evaluation Patient presents at or near baseline functional status at time of evaluation with no identified functional deficits that impede patient independence with activities of daily living.  No indicated need for skilled occupational therapy intervention in the acute care setting.  Occupational therapy will sign off at this time.  -ES       Row Name 11/15/23 1316          Therapy Assessment/Plan (OT)    Criteria for Skilled Therapeutic Interventions Met (OT) no problems identified which require skilled intervention  -ES     Therapy Frequency (OT) evaluation only  -ES       Row Name 11/15/23 1316          Therapy Plan Review/Discharge Plan (OT)    Anticipated Discharge Disposition (OT) home  -ES               User Key  (r) = Recorded By, (t) = Taken By, (c) = Cosigned By      Initials Name Provider Type    Tracey Stanley, OTR/L, CSRS Occupational Therapist                   Outcome Measures       Row Name 11/15/23 6522           How much help from another is currently needed...    Putting on and taking off regular lower body clothing? 4  -ES     Bathing (including washing, rinsing, and drying) 4  -ES     Toileting (which includes using toilet bed pan or urinal) 4  -ES     Putting on and taking off regular upper body clothing 4  -ES     Taking care of personal grooming (such as brushing teeth) 4  -ES     Eating meals 4  -ES     [facility] 6 Clicks Score (OT) 24  -ES       Row Name 11/15/23 0725          How much help from another person do you currently need...    Turning from your back to your side while in flat bed without using bedrails? 4  -MM     Moving from lying on back to sitting on the side of a flat bed without bedrails? 4  -MM     Moving to and from a bed to a chair (including a wheelchair)? 4  -MM     Standing up from a chair using your arms (e.g., wheelchair, bedside chair)? 4  -MM     Climbing 3-5 steps with a railing? 3  -MM     To walk in hospital room? 3  -MM     [facility]PT[facility] 22  -MM     Highest Level of Mobility Goal 7 --> Walk 25 feet or more  -MM       Row Name 11/15/23 1317          Functional Assessment    Outcome Measure Options [facility] 6 Clicks Daily Activity (OT);Optimal Instrument  -ES       Row Name 11/15/23 1317          Optimal Instrument    Optimal Instrument Optimal - 3  -ES     Bending/Stooping 1  -ES     Balancing 1  -ES     Standing 1  -ES     From the list, choose the 3 activities you would most like to be able to do without any difficulty Bending/stooping;Standing;Balancing  -ES     Total Score Optimal - 3 3  -ES               User Key  (r) = Recorded By, (t) = Taken By, (c) = Cosigned By      Initials Name Provider Type    Brenton Bowser, RN Registered Nurse    Tracey Stanley, OTR/L, CSRS Occupational Therapist                    Occupational Therapy Education       Title: PT OT SLP Therapies (Resolved)       Topic: Occupational Therapy (Resolved)       Point: ADL training (Resolved)        Description:   Instruct learner(s) on proper safety adaptation and remediation techniques during self care or transfers.   Instruct in proper use of assistive devices.                  Learner Progress:  Not documented in this visit.              Point: Home exercise program (Resolved)       Description:   Instruct learner(s) on appropriate technique for monitoring, assisting and/or progressing therapeutic exercises/activities.                  Learner Progress:  Not documented in this visit.              Point: Precautions (Resolved)       Description:   Instruct learner(s) on prescribed precautions during self-care and functional transfers.                  Learner Progress:  Not documented in this visit.              Point: Body mechanics (Resolved)       Description:   Instruct learner(s) on proper positioning and spine alignment during self-care, functional mobility activities and/or exercises.                  Learner Progress:  Not documented in this visit.                                  OT Recommendation and Plan  Therapy Frequency (OT): evaluation only  Plan of Care Review  Plan of Care Reviewed With: patient  Outcome Evaluation: Patient presents at or near baseline functional status at time of evaluation with no identified functional deficits that impede patient independence with activities of daily living.  No indicated need for skilled occupational therapy intervention in the acute care setting.  Occupational therapy will sign off at this time.     Time Calculation:   Evaluation Complexity (OT)  Review Occupational Profile/Medical/Therapy History Complexity: brief/low complexity  Assessment, Occupational Performance/Identification of Deficit Complexity: 1-3 performance deficits  Clinical Decision Making Complexity (OT): problem focused assessment/low complexity  Overall Complexity of Evaluation (OT): low complexity     Time Calculation- OT       Row Name 11/15/23 1316             Time  Calculation- OT    OT Received On 11/15/23  -ES         Untimed Charges    OT Eval/Re-eval Minutes 20  -ES         Total Minutes    Untimed Charges Total Minutes 20  -ES       Total Minutes 20  -ES                User Key  (r) = Recorded By, (t) = Taken By, (c) = Cosigned By      Initials Name Provider Type    ES Tracey Olivas, OTR/L, CSRS Occupational Therapist                  Therapy Charges for Today       Code Description Service Date Service Provider Modifiers Qty    [id number]  OT EVAL LOW COMPLEXITY 2 11/15/2023 Tracey Olivas OTR/L, CSRS GO 1                 KARENA Espinal/L, CSRS  11/15/2023

## 2023-11-15 NOTE — CASE MANAGEMENT/SOCIAL WORK
Discharge Planning Assessment   Wei     Patient Name: Татьяна Jackson  MRN: [id number]  Today's Date: 11/15/2023    Admit Date: 11/14/2023    Plan: Pt lives with daughter. Pharm: Ch[facility] Corral, PCP: ALICIA Petersen. Pt plans to return home at discharge. Pt does not drive, family tranfers pt to and from needed appts. SW will continue to follow for needs.   Discharge Needs Assessment       Row Name 11/15/23 0953       Living Environment    People in Home child(sanjeev), adult    Current Living Arrangements home    Potentially Unsafe Housing Conditions none    In the past 12 months has the electric, gas, oil, or water company threatened to shut off services in your home? No    Primary Care Provided by self    Provides Primary Care For no one    Family Caregiver if Needed child(sanjeev), adult    Family Caregiver Names Elzbieta    Quality of Family Relationships helpful;involved;supportive    Able to Return to Prior Arrangements yes       Resource/Environmental Concerns    Resource/Environmental Concerns none    Transportation Concerns none       Food Insecurity    Within the past 12 months, you worried that your food would run out before you got the money to buy more. Never true    Within the past 12 months, the food you bought just didn't last and you didn't have money to get more. Never true       Transition Planning    Patient/Family Anticipates Transition to home with family    Patient/Family Anticipated Services at Transition none    Transportation Anticipated family or friend will provide       Discharge Needs Assessment    Readmission Within the Last 30 Days no previous admission in last 30 days    Equipment Currently Used at Home none    Concerns to be Addressed discharge planning    Anticipated Changes Related to Illness none    Equipment Needed After Discharge none    Discharge Coordination/Progress Pt lives with daughter. Pharm: Chao Corral, PCP: ALICIA Petersen. Pt plans to return home  at discharge. Pt does not drive, family tranfers pt to and from needed appts. SW will continue to follow for needs.                   Discharge Plan       Row Name 11/15/23 0955       Plan    Plan Pt lives with daughter. Pharm: Chao Corral, PCP: ALICIA Petersen. Pt plans to return home at discharge. Pt does not drive, family tranfers pt to and from needed appts. SW will continue to follow for needs.                  Continued Care and Services - Admitted Since 11/14/2023    Coordination has not been started for this encounter.          Demographic Summary       Row Name 11/15/23 0949       General Information    Admission Type observation    Arrived From emergency department    Referral Source admission list    Reason for Consult discharge planning    Preferred Language English       Contact Information    Permission Granted to Share Info With lay caregiver    Contact Information Obtained for lay caregiver       Lay Caregiver Information    Name, Lay Caregiver Elzbieta Hutchinson    Phone, Lay Caregiver [phone]                   Functional Status       Row Name 11/15/23 0952       Functional Status    Usual Activity Tolerance good    Current Activity Tolerance good       Physical Activity    On average, how many days per week do you engage in moderate to strenuous exercise (like a brisk walk)? 0 days    On average, how many minutes do you engage in exercise at this level? 0 min    Number of minutes of exercise per week 0       Assessment of Health Literacy    How often do you have someone help you read hospital materials? Never    How often do you have problems learning about your medical condition because of difficulty understanding written information? Never    How confident are you filling out medical forms by yourself? Quite a bit    Health Literacy Good       Functional Status, IADL    Medications independent    Meal Preparation independent    Housekeeping independent    Laundry independent     Shopping independent       Mental Status    General Appearance WDL WDL       Mental Status Summary    Recent Changes in Mental Status/Cognitive Functioning no changes       Employment/    Employment Status retired                   Psychosocial    No documentation.                  Abuse/Neglect    No documentation.                  Legal       Row Name 11/15/23 0953       Financial Resource Strain    How hard is it for you to pay for the very basics like food, housing, medical care, and heating? Not hard       Financial/Legal    Source of Income social security;pension/long-term    Application for Public Assistance not applied       Legal    Criminal Activity/Legal Involvement none                   Substance Abuse    No documentation.                  Patient Forms    No documentation.                     Sujey Dinero

## 2023-11-15 NOTE — DISCHARGE SUMMARY
Physician Discharge Summary    Patient Identification  PATIENT IDENTIFICATION    Name: Татьяна Jackson  :  1950  MRN: [id number]    Admit date: 2023    Discharge date: 11/15/2023     Admitting Physician: Shawn Lutz MD     Discharge Physician: Leoncio Riddle MD     Admission Diagnoses: TIA (transient ischemic attack) [G45.9]    Hospital Problems:   Principal Problem:  TIA (transient ischemic attack)   Active Problems:  Problems Addressed this Visit          Neuro    * (Principal) TIA (transient ischemic attack) - Primary    Relevant Orders    Ambulatory Referral to Neurology    Ambulatory Referral to Cardiology    Cardiac event monitor     Other Visit Diagnoses       Difficulty in walking              Diagnoses         Codes Comments    TIA (transient ischemic attack)    -  Primary ICD-10-CM: G45.9  ICD-9-CM: 435.9     Difficulty in walking     ICD-10-CM: R26.2  ICD-9-CM: 719.7              Discharged Condition: stable    Consults: IP CONSULT TO NEUROLOGY  IP CONSULT TO HOSPITALIST  IP CONSULT TO CASE MANAGEMENT   IP CONSULT TO DIABETES EDUCATOR  IP CONSULT TO NEUROLOGY    Imaging:   Imaging Results (Last 24 Hours)       Procedure Component Value Units Date/Time    CT Angiogram Head [[id number]] Collected: 23     Updated: 23    Narrative:      PROCEDURE: CT ANGIOGRAM NECK, 2023, 18:23  CT ANGIOGRAM HEAD, 2023, 18:23     COMPARISON: None     INDICATIONS: Dizziness; Memory Loss; Headache     PROTOCOL:   Standard imaging protocol performed      RADIATION:   DLP: 481.9 mGy*cm    Automated exposure control was utilized to minimize radiation dose.   CONTRAST: 100 cc Isovue 370 I.V.     TECHNIQUE: After obtaining the patient's consent, CT images of the neck were obtained without and   with non-ionic intravenous contrast material. Multi-planar reformatted/3-D images were created to   optimize visualization of vascular anatomy. Unless otherwise stated in  this report, all vascular   stenoses involving the internal carotid arteries reported for this examination are derived by   dividing the lesion diameter by the diameter of the normal internal carotid artery more distally.     FINDINGS:   There is no significant carotid stenosis by NASCET criteria.  The vertebral arteries are   codominant.  It does look like there is a moderate stenosis in the very proximal left vertebral   artery.     On evaluation of the intracranial vasculature , the basilar artery does not appear unusual.  There   is some narrowing of the P2 segment of the left posterior cerebral artery that may reflect   atherosclerotic change.  There is some suggested narrowing of the distal M1 segment on the left as   well.  The anterior cerebral arteries and right middle cerebral artery do not appear unusual.       Impression:         1. No significant carotid stenosis.  2. Moderate narrowing of the proximal left vertebral artery.  3. Narrowing of the P2 segment left posterior cerebral artery and narrowing of the distal M1   segment on the left that could be reflective of atherosclerotic changes.            TERRY SMART MD         Electronically Signed and Approved By: TERRY SMART MD on 11/14/2023 at 19:13                     CT Angiogram Neck [[id number]] Collected: 11/14/23 1913     Updated: 11/14/23 1917    Narrative:      PROCEDURE: CT ANGIOGRAM NECK, 11/14/2023, 18:23  CT ANGIOGRAM HEAD, 11/14/2023, 18:23     COMPARISON: None     INDICATIONS: Dizziness; Memory Loss; Headache     PROTOCOL:   Standard imaging protocol performed      RADIATION:   DLP: 481.9 mGy*cm    Automated exposure control was utilized to minimize radiation dose.   CONTRAST: 100 cc Isovue 370 I.V.     TECHNIQUE: After obtaining the patient's consent, CT images of the neck were obtained without and   with non-ionic intravenous contrast material. Multi-planar reformatted/3-D images were created to   optimize visualization of vascular  anatomy. Unless otherwise stated in this report, all vascular   stenoses involving the internal carotid arteries reported for this examination are derived by   dividing the lesion diameter by the diameter of the normal internal carotid artery more distally.     FINDINGS:   There is no significant carotid stenosis by NASCET criteria.  The vertebral arteries are   codominant.  It does look like there is a moderate stenosis in the very proximal left vertebral   artery.     On evaluation of the intracranial vasculature , the basilar artery does not appear unusual.  There   is some narrowing of the P2 segment of the left posterior cerebral artery that may reflect   atherosclerotic change.  There is some suggested narrowing of the distal M1 segment on the left as   well.  The anterior cerebral arteries and right middle cerebral artery do not appear unusual.       Impression:         1. No significant carotid stenosis.  2. Moderate narrowing of the proximal left vertebral artery.  3. Narrowing of the P2 segment left posterior cerebral artery and narrowing of the distal M1   segment on the left that could be reflective of atherosclerotic changes.            TERRY SMART MD         Electronically Signed and Approved By: TERRY SMART MD on 11/14/2023 at 19:13                     MRI Brain Without Contrast [[id number]] Collected: 11/14/23 1602     Updated: 11/14/23 1605    Narrative:      PROCEDURE: MRI BRAIN WO CONTRAST     COMPARISON: None     INDICATIONS: evaluate for stroke             TECHNIQUE: A variety of imaging planes and parameters were utilized for visualization of suspected   pathology.  Images were performed without contrast.     FINDINGS:   On diffusion there is some scattered signal changes in the deep white matter difficult to   appreciate on the ADC map that may reflect T2 shine through versus bilateral subacute small vessel   ischemic change.  There are subcortical and deep white matter changes which could  reflect more   chronic small vessel ischemic change.  There is no unusual blooming on susceptibility imaging.    Pituitary is not enlarged.  There is no definite orbital abnormality.  The paranasal sinuses seem   clear.       Impression:         1. There are subtle areas of signal in the deep white matter of both cerebral hemispheres on   diffusion that may be reflective of T2 shine through or sequela of bilateral subacute small vessel   ischemic change.  There are prominent T2 signal changes involving both cerebral hemispheres which   while nonspecific could reflect more chronic small vessel ischemic change.               TERRY SMART MD         Electronically Signed and Approved By: TERRY SMART MD on 11/14/2023 at 16:02                     CT Head Without Contrast [[id number]] Collected: 11/14/23 1254     Updated: 11/14/23 1257    Narrative:      PROCEDURE: CT HEAD WO CONTRAST     COMPARISON:  [facility], CT, CT HEAD WO CONTRAST, 6/29/2022, 14:24.     INDICATIONS: Mental status change, unknown cause, patient stated that her earrings cannot come out     PROTOCOL:   Standard imaging protocol performed      RADIATION:   DLP: 1018.2mGy*cm    MA and/or KV was adjusted to minimize radiation dose.       TECHNIQUE: CT images were obtained without non-ionic intravenous contrast material.      FINDINGS:   The ventricles, sulci, and cerebellar folia are mildly and diffusely prominent consistent with   atrophy.     Ill-defined diminished density in cerebral white matter is consistent with mild gliosis and/or   scattered old lacunar infarcts.     There is no CT evidence of acute intracranial hemorrhage, mass, or mass effect.     The orbits have a normal appearance.     The paranasal sinuses, middle ears, and mastoid air cells are well aerated.       Impression:         CT scan of the head without IV contrast demonstrating mild diffuse atrophy and white matter   changes.     No acute intracranial abnormality is  seen.            ERIN MCMILLAN MD         Electronically Signed and Approved By: ERIN MCMILLAN MD on 11/14/2023 at 12:54                     XR Chest 1 View [[id number]] Collected: 11/14/23 1221     Updated: 11/14/23 1224    Narrative:      PROCEDURE: XR CHEST 1 VW     COMPARISON: [facility], CR, XR CHEST 1 VW, 8/13/2021, 13:48.     INDICATIONS: DIZZINESS, MEMORY LOSS TODAY     FINDINGS:   Heart size within normal limits.  Negative for infectious consolidation, edema, large effusion or   pneumothorax.  Surgical clips within chest wall.  Osseous structures grossly unremarkable.       Impression:         No active airspace process.                  RADHA GALLO MD         Electronically Signed and Approved By: RADHA GALLO MD on 11/14/2023 at 12:21                             Labs:   Lab Results (last 24 hours)       Procedure Component Value Units Date/Time    Hemoglobin A1c [[id number]] Collected: 11/15/23 0446    Specimen: Blood from Arm, Right Updated: 11/15/23 0715    POC Glucose Once [[id number]]  (Abnormal) Collected: 11/15/23 0556    Specimen: Blood Updated: 11/15/23 0559     Glucose 103 mg/dL      Comment: Serial Number: [id number]:  [id number]       Lipid Panel [[id number]]  (Abnormal) Collected: 11/15/23 0446    Specimen: Blood from Arm, Right Updated: 11/15/23 0537     Total Cholesterol 248 mg/dL      Triglycerides 87 mg/dL      HDL Cholesterol 51 mg/dL      LDL Cholesterol  182 mg/dL      VLDL Cholesterol 15 mg/dL      LDL/HDL Ratio 3.52    Narrative:      Cholesterol Reference Ranges  (U.S. Department of Health and Human Services ATP III Classifications)    Desirable          <200 mg/dL  Borderline High    200-239 mg/dL  High Risk          >240 mg/dL      Triglyceride Reference Ranges  (U.S. Department of Health and Human Services ATP III Classifications)    Normal           <150 mg/dL  Borderline High  150-199 mg/dL  High             200-499 mg/dL  Very High        >500  mg/dL    HDL Reference Ranges  (U.S. Department of Health and Human Services ATP III Classifications)    Low     <40 mg/dl (major risk factor for CHD)  High    >60 mg/dl ('negative' risk factor for CHD)        LDL Reference Ranges  (U.S. Department of Health and Human Services ATP III Classifications)    Optimal          <100 mg/dL  Near Optimal     100-129 mg/dL  Borderline High  130-159 mg/dL  High             160-189 mg/dL  Very High        >189 mg/dL    Comprehensive Metabolic Panel [[id number]]  (Abnormal) Collected: 11/15/23 0446    Specimen: Blood from Arm, Right Updated: 11/15/23 0537     Glucose 102 mg/dL      BUN 10 mg/dL      Creatinine 0.67 mg/dL      Sodium 141 mmol/L      Potassium 3.6 mmol/L      Chloride 106 mmol/L      CO2 24.6 mmol/L      Calcium 9.0 mg/dL      Total Protein 7.0 g/dL      Albumin 4.1 g/dL      ALT (SGPT) 10 U/L      AST (SGOT) 16 U/L      Alkaline Phosphatase 69 U/L      Total Bilirubin 0.4 mg/dL      Globulin 2.9 gm/dL      A/G Ratio 1.4 g/dL      BUN/Creatinine Ratio 14.9     Anion Gap 10.4 mmol/L      eGFR 91.8 mL/min/1.73     Narrative:      GFR Normal >60  Chronic Kidney Disease <60  Kidney Failure <15    The GFR formula is only valid for adults with stable renal function between ages 18 and 70.    Magnesium [[id number]]  (Normal) Collected: 11/15/23 0446    Specimen: Blood from Arm, Right Updated: 11/15/23 0537     Magnesium 2.3 mg/dL     CBC & Differential [[id number]]  (Abnormal) Collected: 11/15/23 0446    Specimen: Blood from Arm, Right Updated: 11/15/23 0513    Narrative:      The following orders were created for panel order CBC & Differential.  Procedure                               Abnormality         Status                     ---------                               -----------         ------                     CBC Auto Differential[[id number]]        Abnormal            Final result                 Please view results for these tests on the individual orders.    CBC  Auto Differential [[id number]]  (Abnormal) Collected: 11/15/23 0446    Specimen: Blood from Arm, Right Updated: 11/15/23 0513     WBC 8.38 10*3/mm3      RBC 5.33 10*6/mm3      Hemoglobin 13.9 g/dL      Hematocrit 43.4 %      MCV 81.4 fL      MCH 26.1 pg      MCHC 32.0 g/dL      RDW 15.2 %      RDW-SD 44.7 fl      MPV 11.2 fL      Platelets 269 10*3/mm3      Neutrophil % 58.5 %      Lymphocyte % 28.8 %      Monocyte % 8.9 %      Eosinophil % 2.9 %      Basophil % 0.7 %      Immature Grans % 0.2 %      Neutrophils, Absolute 4.90 10*3/mm3      Lymphocytes, Absolute 2.41 10*3/mm3      Monocytes, Absolute 0.75 10*3/mm3      Eosinophils, Absolute 0.24 10*3/mm3      Basophils, Absolute 0.06 10*3/mm3      Immature Grans, Absolute 0.02 10*3/mm3      nRBC 0.0 /100 WBC     TSH Rfx On Abnormal To Free T4 [[id number]]  (Normal) Collected: 11/14/23 1326    Specimen: Blood Updated: 11/14/23 2049     TSH 1.680 uIU/mL     Urinalysis With Microscopic If Indicated (No Culture) - Urine, Clean Catch [[id number]]  (Abnormal) Collected: 11/14/23 1625    Specimen: Urine, Clean Catch Updated: 11/14/23 1645     Color, UA Yellow     Appearance, UA Cloudy     pH, UA 8.0     Specific Gravity, UA 1.009     Glucose, UA Negative     Ketones, UA Negative     Bilirubin, UA Negative     Blood, UA Negative     Protein, UA Negative     Leuk Esterase, UA Trace     Nitrite, UA Negative     Urobilinogen, UA 0.2 E.U./dL    Urinalysis, Microscopic Only - Urine, Clean Catch [[id number]]  (Abnormal) Collected: 11/14/23 1625    Specimen: Urine, Clean Catch Updated: 11/14/23 1645     RBC, UA 0-2 /HPF      WBC, UA 3-5 /HPF      Bacteria, UA None Seen /HPF      Squamous Epithelial Cells, UA 0-2 /HPF      Hyaline Casts, UA None Seen /LPF      Methodology Automated Microscopy    Comprehensive Metabolic Panel [[id number]]  (Abnormal) Collected: 11/14/23 1326    Specimen: Blood Updated: 11/14/23 1354     Glucose 99 mg/dL      BUN 11 mg/dL      Creatinine 0.71 mg/dL       Sodium 142 mmol/L      Potassium 3.3 mmol/L      Chloride 105 mmol/L      CO2 28.2 mmol/L      Calcium 9.0 mg/dL      Total Protein 7.9 g/dL      Albumin 4.6 g/dL      ALT (SGPT) 12 U/L      AST (SGOT) 18 U/L      Alkaline Phosphatase 80 U/L      Total Bilirubin 0.3 mg/dL      Globulin 3.3 gm/dL      A/G Ratio 1.4 g/dL      BUN/Creatinine Ratio 15.5     Anion Gap 8.8 mmol/L      eGFR 89.4 mL/min/1.73     Narrative:      GFR Normal >60  Chronic Kidney Disease <60  Kidney Failure <15    The GFR formula is only valid for adults with stable renal function between ages 18 and 70.    Single High Sensitivity Troponin T [[id number]]  (Normal) Collected: 11/14/23 1326    Specimen: Blood Updated: 11/14/23 1354     HS Troponin T <6 ng/L     Narrative:      High Sensitive Troponin T Reference Range:  <14.0 ng/L- Negative Female for AMI  <22.0 ng/L- Negative Male for AMI  >=14 - Abnormal Female indicating possible myocardial injury.  >=22 - Abnormal Male indicating possible myocardial injury.   Clinicians would have to utilize clinical acumen, EKG, Troponin, and serial changes to determine if it is an Acute Myocardial Infarction or myocardial injury due to an underlying chronic condition.         Magnesium [[id number]]  (Normal) Collected: 11/14/23 1326    Specimen: Blood Updated: 11/14/23 1354     Magnesium 2.3 mg/dL     CBC & Differential [[id number]]  (Abnormal) Collected: 11/14/23 1326    Specimen: Blood Updated: 11/14/23 1335    Narrative:      The following orders were created for panel order CBC & Differential.  Procedure                               Abnormality         Status                     ---------                               -----------         ------                     CBC Auto Differential[[id number]]        Abnormal            Final result                 Please view results for these tests on the individual orders.    CBC Auto Differential [[id number]]  (Abnormal) Collected: 11/14/23 1326    Specimen: Blood  Updated: 11/14/23 1335     WBC 7.94 10*3/mm3      RBC 5.29 10*6/mm3      Hemoglobin 14.0 g/dL      Hematocrit 43.5 %      MCV 82.2 fL      MCH 26.5 pg      MCHC 32.2 g/dL      RDW 15.3 %      RDW-SD 46.2 fl      MPV 11.2 fL      Platelets 267 10*3/mm3      Neutrophil % 55.6 %      Lymphocyte % 33.9 %      Monocyte % 6.9 %      Eosinophil % 2.4 %      Basophil % 0.9 %      Immature Grans % 0.3 %      Neutrophils, Absolute 4.42 10*3/mm3      Lymphocytes, Absolute 2.69 10*3/mm3      Monocytes, Absolute 0.55 10*3/mm3      Eosinophils, Absolute 0.19 10*3/mm3      Basophils, Absolute 0.07 10*3/mm3      Immature Grans, Absolute 0.02 10*3/mm3      nRBC 0.0 /100 WBC     [place] Draw [[id number]] Collected: 11/14/23 1226    Specimen: Blood Updated: 11/14/23 1239    Narrative:      The following orders were created for panel order [place] Draw.  Procedure                               Abnormality         Status                     ---------                               -----------         ------                     Green Top (Gel)[[id number]]                                  Final result               Lavender Top[[id number]]                                     Final result               Gold Top - SST[[id number]]                                   Final result               Light Blue Top[[id number]]                                   Final result                 Please view results for these tests on the individual orders.    Gold Top - SST [[id number]] Collected: 11/14/23 1226    Specimen: Blood Updated: 11/14/23 1239     Extra Tube Hold for add-ons.     Comment: Auto resulted.       Light Blue Top [[id number]] Collected: 11/14/23 1226    Specimen: Blood Updated: 11/14/23 1239     Extra Tube Hold for add-ons.     Comment: Auto resulted       Lavender Top [[id number]] Collected: 11/14/23 1226    Specimen: Blood Updated: 11/14/23 1239     Extra Tube hold for add-on     Comment: Auto resulted       Green Top (Gel) [[id number]]  Collected: 11/14/23 1226    Specimen: Blood Updated: 11/14/23 1231     Extra Tube Hold for add-ons.     Comment: Auto resulted.                 Hospital Course:   3 y.o. female past medical history of osteo arthritis, breast cancer, and UC who presents with vertigo concerning for TIA.    CT of the head shows no acute abnormalities.  MRI of the brain shows subtle areas of signal in the deep white matter of both cerebral hemispheres on diffusion that may be reflective of sequela of bilateral subacute small vessel ischemic change.  CT of the head neck shows no significant carotid artery stenosis, moderate narrowing of the proximal left vertebral artery, narrowing of the P2 segment left posterior cerebral artery and narrowing of distal M1 segment of the left that could reflect of atherosclerotic disease. Lipid panel showed poor control. Teleneurology was consulted and recommended asa and statin daily and 30 day cardiac monitor at d/c with outpt follow up with Neuro and Cardiology. Symptoms resolved by time of d/c    Discharge Exam:  Gen: up in bed, in NAD  CV:  RRR, no m/r/g, no peripheral edema  Resp: CTAB, no increase work of breathing  Abd: soft, NT, ND, bs present  Neuro: moves all 4 ext, following commands  Ext: no clubbing, cyanosis or edema  Psych: AAOx3, pleasant affect    Disposition: Home    Patient Discharge Medications:      Discharge Medications        New Medications        Instructions Start Date   aspirin 81 MG chewable tablet   81 mg, Oral, Daily   Start Date: November 16, 2023     atorvastatin 80 MG tablet  Commonly known as: LIPITOR   80 mg, Oral, Nightly             Continue These Medications        Instructions Start Date   acetaminophen 325 MG tablet  Commonly known as: TYLENOL   650 mg, Oral, Nightly PRN              Follow-up Information       Guillaume Siegel MD .    Specialty: Neurology  Contact information:  [address] [place] [address] [phone]                Oleksandr Muhammad MD .    Specialty: Cardiology  Contact information:  [address] [place] [address]  [phone]               Jenni Petersen, APRN .    Specialties: Nurse Practitioner, Internal Medicine, Internal Medicine & Pediatrics  Contact information:  [address] [address]  [phone]                                 Signed:  Popeye Riddle MD  [facility]  11/15/2023  10:19 EST      I spent 31 minutes arranging and coordinating discharge and reviewing medications with majority of time spent counseling patient

## 2023-11-15 NOTE — PROGRESS NOTES
[facility] Consult [facility]    Routine Consult Follow-Up    Patient Name:   Татьяна Jackson  YOB: 1950  Identification Number:   MRN - [id number]  Date of Service:   11/15/2023 08:41:30    Diagnosis        R26.81 - Unsteady gait        R42 - Dizziness/ Vertigo/ Giddiness    Impression  73-year-old female with a history of HTN, avoidance of medications presenting to the ED with sudden onset of dizziness, gait instability, and nausea. The dizziness lasted 6 hours before resolving. She reports a history of ulcerative colitis.    FINDINGS:  - MRI of the brain without contrast - No acute intracranial abnormality. Extensive bilateral subcortical ischemic white matter changes.  - CTA of the head and neck - Moderate narrowing of the left vertebral artery. Mild narrowing of the left M1 and left PCA  - TTE - no thrombus.  - .    DIAGNOSIS:  1. Dizziness; suspect TIA.    PLAN:  - recommend either ASA 81mg or Plavix 75mg daily as she has a history of stroke. She refused to take aspirin as She has GI condition (Ulcerative colitis) but says she  will consider taking asa as stroke preventative.   - HbA1c goal <7.0 and LDL goal <70  - 30 day cardiac monitoring.  - Neurology will sign off  - Outpatient neurology and cardiology follow-up in 1-3 weeks.      Our recommendations are outlined below    Nursing Recommendations :  Continue with Telemetry    Consultations :  Recommend Speech therapy if failed dysphagia screenPhysical therapy/Occupational therapy    Disposition :  No further recommendations  Will signoff please contact for any questions  Outpatient Neurology follow up in 1-3 weeks    Subjective  The patient was seen and examined.      Examination  1A: Level of Consciousness - Alert; keenly responsive + 0  1B: Ask Month and Age - Both Questions Right + 0  1C: Blink Eyes & Squeeze Hands - Performs Both Tasks + 0  2: Test Horizontal Extraocular Movements - Normal +  0  3: Test Visual Fields - No Visual Loss + 0  4: Test Facial Palsy (Use Grimace if Obtunded) - Normal symmetry + 0  5A: Test Left Arm Motor Drift - No Drift for 10 Seconds + 0  5B: Test Right Arm Motor Drift - No Drift for 10 Seconds + 0  6A: Test Left Leg Motor Drift - No Drift for 5 Seconds + 0  6B: Test Right Leg Motor Drift - No Drift for 5 Seconds + 0  7: Test Limb Ataxia (FNF/Heel-Shin) - No Ataxia + 0  8: Test Sensation - Normal; No sensory loss + 0  9: Test Language/Aphasia - Normal; No aphasia + 0  10: Test Dysarthria - Normal + 0  11: Test Extinction/Inattention - No abnormality + 0    NIHSS Score: 0          Patient/Family was informed the Neurology Consult would happen via TeleHealth consult by way of interactive audio and video telecommunications and consented to receiving care in this manner.    Telehealth Neurology consultation was provided. I spent 25 minutes providing telehealth care. This includes time spent for face to face visit via telemedicine, review of medical records, imaging studies and discussion of findings with providers, the patient and/or family.      Dr Popeye Treviño      [facility]  For Inpatient follow-up with [facility] physician please call [facility] [phone]. This is not an outpatient service. Post hospital discharge, please contact hospital directly.

## 2023-11-15 NOTE — TELEPHONE ENCOUNTER
Caller: TITI     Relationship to patient: DISCHARGE     Best call back number: [phone]    New or established patient?  [x] New  [] Established    Date of discharge: 11.15.23    Facility discharged from: [facility]    Diagnosis/Symptoms: TIA     Length of stay (If applicable): 1 DAY     Specialty Only: Did you see a [facility] health provider?    [] Yes  [] No  If so, who? BARBRA TRONCOSO        PT TO FOLLOW FP 1-3 WEEKS  WITH OROPILLA     PLEASE ADVISE.

## 2023-11-15 NOTE — THERAPY EVALUATION
Acute Care - Physical Therapy Initial Evaluation   Wei     Patient Name: Татьяна Jackson  : 1950  MRN: [id number]  Today's Date: 11/15/2023      Visit Dx:     ICD-10-CM ICD-9-CM   1. TIA (transient ischemic attack)  G45.9 435.9   2. Difficulty in walking  R26.2 719.7     Patient Active Problem List   Diagnosis    Ulcerative colitis    Primary hypertension    Overweight (BMI 25.0-29.9)    Arthralgia    Right wrist pain    Slow transit constipation    Herpes zoster without complication    Intraductal papillary adenocarcinoma of left breast    Near syncope    Hematochezia    Status post bilateral mastectomy with left sentinel node biopsy    Dizziness    TIA (transient ischemic attack)     Past Medical History:   Diagnosis Date    Arthritis     Breast cancer     left breast, diagnosed with breast cancer 2 weeks ago, [facility]    Status post bilateral mastectomy with left sentinel node biopsy 3/9/2022    Ulcerative colitis      Past Surgical History:   Procedure Laterality Date    COLONOSCOPY      [place]'t know name of hospital     COLONOSCOPY N/A 3/3/2022    Procedure: COLONOSCOPY WITH BX, POLYPECTOMIES, HOT SNARE;  Surgeon: Margie Dotson MD;  Location: [facility] ENDOSCOPY;  Service: Gastroenterology;  Laterality: N/A;  COLON POLYPS, HEMORRHOIDS    HERNIA REPAIR      UMBILICAL HERNIA REPAIR    MASTECTOMY W/ SENTINEL NODE BIOPSY Bilateral 3/8/2022    Procedure: BREAST MASTECTOMY WITH SENTINEL NODE BIOPSY;  Surgeon: Deborah Guillaume MD;  Location: [facility] OR [facility];  Service: General;  Laterality: Bilateral;    UPPER GASTROINTESTINAL ENDOSCOPY       PT Assessment (last 12 hours)       PT Evaluation and Treatment       Row Name 11/15/23 0900          Physical Therapy Time and Intention    Subjective Information no complaints  -KADI     Document Type evaluation  -KADI     Mode of Treatment individual therapy;physical therapy  -KADI     Patient Effort good  -KADI       Row Name 11/15/23  0900          General Information    Patient Observations alert;cooperative;agree to therapy  -KADI     Prior Level of Function independent:;all household mobility;community mobility  -KADI     Equipment Currently Used at Home none  -KADI     Existing Precautions/Restrictions no known precautions/restrictions  -KADI     Barriers to Rehab none identified  -KADI       Row Name 11/15/23 0900          Living Environment    Current Living Arrangements home  -KADI       Row Name 11/15/23 0900          Cognition    Orientation Status (Cognition) oriented x 3  -KADI       Row Name 11/15/23 0900          Range of Motion (ROM)    Range of Motion ROM is WFL  -KADI       Row Name 11/15/23 0900          Strength (Manual Muscle Testing)    Strength (Manual Muscle Testing) strength is L  -KADI       Row Name 11/15/23 0900          Bed Mobility    Bed Mobility bed mobility (all) activities;supine-sit  -KADI     All Activities, [place] (Bed Mobility) independent  -KADI     Supine-Sit [place] (Bed Mobility) independent  -KADI       Row Name 11/15/23 0900          Transfers    Transfers sit-stand transfer  -KADI       Row Name 11/15/23 0900          Sit-Stand Transfer    Sit-Stand [place] (Transfers) independent  -KADI       Row Name 11/15/23 0900          Gait/Stairs (Locomotion)    Gait/Stairs Locomotion gait/ambulation independence  -KADI     [place] Level (Gait) independent  -KADI     Distance in Feet (Gait) 50  -KADI       Row Name 11/15/23 0900          Balance    Balance Assessment standing dynamic balance  -KADI     Dynamic Standing Balance independent  -KADI       Row Name 11/15/23 0900          Plan of Care Review    Plan of Care Reviewed With patient  -KADI     Outcome Evaluation Pt did not demonstrate any safety or mobility deficits during the initial evaluation.  Pt is safe to continue ambulating within their room independently until their discharge from the hospital.  Pt will be discharged from PT services at this time.  -KADI       Row  Name 11/15/23 0900          Therapy Assessment/Plan (PT)    Criteria for Skilled Interventions Met (PT) no problems identified which require skilled intervention  -KADI     Therapy Frequency (PT) evaluation only  -KADI       Row Name 11/15/23 0900          PT Evaluation Complexity    History, PT Evaluation Complexity no personal factors and/or comorbidities  -KADI     Examination of Body Systems (PT Eval Complexity) total of 4 or more elements  -KADI     Clinical Presentation (PT Evaluation Complexity) stable  -KADI     Clinical Decision Making (PT Evaluation Complexity) low complexity  -KADI     Overall Complexity (PT Evaluation Complexity) low complexity  -KADI       Row Name 11/15/23 0900          Therapy Plan Review/Discharge Plan (PT)    Therapy Plan Review (PT) evaluation/treatment results reviewed;participants voiced agreement with care plan;participants included;patient  -KADI       Row Name 11/15/23 0900          Physical Therapy Goals    Problem Specific Goal Selection (PT) problem specific goal 1, PT  -KADI       Row Name 11/15/23 0900          Problem Specific Goal 1 (PT)    Problem Specific Goal 1 (PT) Complete PT evaluation  -KADI     Time Frame (Problem Specific Goal 1, PT) 1 day  -KADI     Progress/Outcome (Problem Specific Goal 1, PT) goal met  -KADI               User Key  (r) = Recorded By, (t) = Taken By, (c) = Cosigned By      Initials Name Provider Type    Timi Connors, PT Physical Therapist                      PT Recommendation and Plan  Anticipated Discharge Disposition (PT): home  Therapy Frequency (PT): evaluation only  Plan of Care Reviewed With: patient  Outcome Evaluation: Pt did not demonstrate any safety or mobility deficits during the initial evaluation.  Pt is safe to continue ambulating within their room independently until their discharge from the hospital.  Pt will be discharged from PT services at this time.       Time Calculation:    PT Charges       Row Name 11/15/23 0920             Time  Calculation    PT Received On 11/15/23  -KADI         Untimed Charges    PT Eval/Re-eval Minutes 20  -KADI         Total Minutes    Untimed Charges Total Minutes 20  -KADI       Total Minutes 20  -KADI                User Key  (r) = Recorded By, (t) = Taken By, (c) = Cosigned By      Initials Name Provider Type    Timi Connors, PT Physical Therapist                  Therapy Charges for Today       Code Description Service Date Service Provider Modifiers Qty    94130772243 HC PT EVAL LOW COMPLEXITY 2 11/15/2023 Timi Rodriguez, PT GP 1            PT G-Codes  AM-PAC 6 Clicks Score (PT): 22    Timi Rodriguez, PT  11/15/2023

## 2023-11-15 NOTE — OUTREACH NOTE
Prep Survey      Flowsheet Row Responses   [facility] facility patient discharged from? [facility]   Is LACE score < 7 ? Yes   Eligibility [facility]   Hospital [facility]   Date of Admission 11/14/23   Date of Discharge 11/15/23   Discharge Disposition Home or Self Care   Discharge diagnosis TIA (transient ischemic attack)   Does the patient have one of the following disease processes/diagnoses(primary or secondary)? Stroke   Does the patient have Home health ordered? No   Is there a DME ordered? No   Medication alerts for this patient Aspirin, Lipitor   Prep survey completed? Yes            Maday ORANTES - Registered Nurse

## 2023-11-15 NOTE — TELEPHONE ENCOUNTER
Caller: GISSELLE    Relationship to patient: NURSE, [facility]    Best call back number: [phone]     Patient is needing: PATIENT IS A POST HOSPITAL ADMIT FOR TIA AND IS DISCHARGING FROM [facility] ON 11/15/23. PATIENT NEEDS AN APPOINTMENT IN THE AFTERNOON. PLEASE CALL PATIENT TO SCHEDULE.       UNABLE TO WARM TRANSFER

## 2023-11-16 ENCOUNTER — TRANSITIONAL CARE MANAGEMENT TELEPHONE ENCOUNTER (OUTPATIENT)
Dept: CALL CENTER | Facility: HOSPITAL | Age: 73
End: 2023-11-16
Payer: MEDICARE

## 2023-11-16 LAB
QT INTERVAL: 403 MS
QTC INTERVAL: 489 MS

## 2023-11-16 NOTE — OUTREACH NOTE
Call Center TCM Note      Flowsheet Row Responses   [facility] patient discharged from? [facility]   Does the patient have one of the following disease processes/diagnoses(primary or secondary)? Stroke   TCM attempt successful? Yes   Call start time 1116   Discharge diagnosis TIA (transient ischemic attack)   Meds reviewed with patient/caregiver? Yes   Is the patient having any side effects they believe may be caused by any medication additions or changes? No   Does the patient have all medications ordered at discharge? Yes   Is the patient taking all medications as directed (includes completed medication regime)? Yes   Comments Pt cancelled the HOSP DC FU for 11/21/23 as she states she needs AFTERNOON only appts. No appt noted.   Does the patient have an appointment with their PCP within 7-14 days of discharge? No   Nursing Interventions Routed TCM call to PCP office   Has home health visited the patient within 72 hours of discharge? N/A   Psychosocial issues? No   Does the patient require any assistance with activities of daily living such as eating, bathing, dressing, walking, etc.? No   Does the patient have any residual symptoms from stroke/TIA? No   Does the patient understand the diet ordered at discharge? Yes   Did the patient receive a copy of their discharge instructions? Yes   Nursing interventions Reviewed instructions with patient   What is the patient's perception of their health status since discharge? Improving   Nursing interventions Nurse provided patient education   Is the patient/caregiver able to teach back the risk factors for a stroke? High Cholesterol, High blood pressure-goal below 120/80, History of TIAs   Is the patient/caregiver able to teach back the hierarchy of who to call/visit for symptoms/problems? PCP, Specialist, Home health nurse, Urgent Care, ED, 911 Yes   Is the patient able to teach back FAST for Stroke? S peech: Listen for slurred speech, B alance: Watch for sudden loss of  balance, E yes: Check for vision loss, F ace: Look for an uneven smile, A rm: Check if one arm is weak, T bar: Call 9-1-1 right away   TCM call completed? Yes   Wrap up additional comments Pt reports she is doing well. Pt states that she can not keep appt listed as she needs afternoon appt.            Ansley Kovacs RN    11/16/2023, 11:23 EST

## 2023-11-16 NOTE — OUTREACH NOTE
Call Center TCM Note      Flowsheet Row Responses   [facility] facility patient discharged from? [facility]   Does the patient have one of the following disease processes/diagnoses(primary or secondary)? Stroke   TCM attempt successful? No   Unsuccessful attempts Attempt 1   Comments HOSP DC FU appt 11/21/23 9 am            Ansley Kovacs RN    11/16/2023, 08:23 EST

## 2023-11-21 ENCOUNTER — OFFICE VISIT (OUTPATIENT)
Dept: INTERNAL MEDICINE | Facility: CLINIC | Age: 73
End: 2023-11-21
Payer: MEDICARE

## 2023-11-21 VITALS
HEART RATE: 91 BPM | HEIGHT: 67 IN | DIASTOLIC BLOOD PRESSURE: 90 MMHG | OXYGEN SATURATION: 98 % | WEIGHT: 178 LBS | SYSTOLIC BLOOD PRESSURE: 148 MMHG | TEMPERATURE: 96.5 F | BODY MASS INDEX: 27.94 KG/M2

## 2023-11-21 DIAGNOSIS — G45.9 TIA (TRANSIENT ISCHEMIC ATTACK): Primary | ICD-10-CM

## 2023-11-21 DIAGNOSIS — I10 PRIMARY HYPERTENSION: Chronic | ICD-10-CM

## 2023-11-21 DIAGNOSIS — E78.2 MIXED HYPERLIPIDEMIA: ICD-10-CM

## 2023-11-21 RX ORDER — MELATONIN
1000 2 TIMES DAILY
COMMUNITY

## 2023-11-21 NOTE — PROGRESS NOTES
Transitional Care Follow Up Visit  Subjective     Татьяна is a 73 y.o. female who presents for a transitional care management visit.    Within 48 business hours after discharge our office contacted her via telephone to coordinate her care and needs.      I reviewed and discussed the details of that call along with the discharge summary, hospital problems, inpatient lab results, inpatient diagnostic studies, and consultation reports with Татьяна.     Current outpatient and discharge medications have been reconciled for the patient.  Reviewed by: SHERYL Tracey          11/15/2023     3:56 PM   Date of TCM Phone Call   Hospital [facility]   Date of Admission 11/14/2023   Date of Discharge 11/15/2023   Discharge Disposition Home or Self Care       Risk for Readmission (LACE) Score: 2 (11/15/2023  6:00 AM)      History of Present Illness     Course During Hospital Stay The following information was reviewed by: SHERYL Tracey on 11/21/2023:  female past medical history of osteo arthritis, breast cancer, and UC who presents with vertigo concerning for TIA.     CT of the head shows no acute abnormalities.  MRI of the brain shows subtle areas of signal in the deep white matter of both cerebral hemispheres on diffusion that may be reflective of sequela of bilateral subacute small vessel ischemic change.  CT of the head neck shows no significant carotid artery stenosis, moderate narrowing of the proximal left vertebral artery, narrowing of the P2 segment left posterior cerebral artery and narrowing of distal M1 segment of the left that could reflect of atherosclerotic disease. Lipid panel showed poor control. Teleneurology was consulted and recommended asa and statin daily and 30 day cardiac monitor at d/c with outpt follow up with Neuro and Cardiology. Symptoms resolved by time of d/c     She reports feeling light-headed and some memory issues with memory that are ongoing. She denies headache. Denies new or worseShe  "reports only take the medication for 1 night. She denies having side effects but has concerns about potential side effects. She has ordered a herbal supplement, (Oxyntix--lions lewis, turkey tail, mushroom) that she would like to take for 30 days then retest.     The following portions of the patient's history were reviewed and updated as appropriate: allergies, current medications, past family history, past medical history, past social history, past surgical history, and problem list.     Vitals:    11/21/23 1348   BP: 148/90   BP Location: Right arm   Patient Position: Sitting   Cuff Size: Adult   Pulse: 91   Temp: 96.5 °F (35.8 °C)   TempSrc: Temporal   SpO2: 98%   Weight: 80.7 kg (178 lb)   Height: 170.2 cm (67.01\")       Review of Systems    Objective   Physical Exam  Vitals and nursing note reviewed.   Constitutional:       General: She is not in acute distress.     Appearance: Normal appearance.   HENT:      Head: Normocephalic and atraumatic.      Right Ear: External ear normal.      Left Ear: External ear normal.      Nose: Nose normal.      Mouth/Throat:      Mouth: Mucous membranes are moist.   Eyes:      Conjunctiva/sclera: Conjunctivae normal.   Cardiovascular:      Rate and Rhythm: Normal rate and regular rhythm.      Pulses: Normal pulses.      Heart sounds: Normal heart sounds. No murmur heard.     No friction rub. No gallop.   Pulmonary:      Effort: Pulmonary effort is normal. No respiratory distress.      Breath sounds: No wheezing, rhonchi or rales.   Musculoskeletal:      Cervical back: Neck supple.      Right lower leg: No edema.      Left lower leg: No edema.   Skin:     General: Skin is warm and dry.   Neurological:      General: No focal deficit present.      Mental Status: She is alert and oriented to person, place, and time.   Psychiatric:         Mood and Affect: Mood normal.         Behavior: Behavior normal.           Assessment & Plan     Diagnoses and all orders for " this visit:    1. TIA (transient ischemic attack) (Primary)    2. Mixed hyperlipidemia  -     Comprehensive Metabolic Panel; Future  -     Lipid Panel; Future    3. Primary hypertension    Discussed significant concern that the patient has stopped her Lipitor and is unwilling to take this at this time.  Counseled on potential side effects but that she may not experience any of these.  Discussed that there are alternative medications if she does have significant side effects.  Explained significant risk for stroke and debility that can occur.  Patient verbalized understanding of risk for all these things but is still adamant that she does not wish to take a statin at this time.  She is going to try her over-the-counter herbal supplement which she has ordered from [employer].  Discussed concern for efficacy and lack of regulation of medication that she plans to start.  Again patient is adamant about trying this supplement for 30 days.  We will recheck cholesterol and chemistry at that time.  Patient states she is willing to start Crestor if cholesterol numbers are not significantly improved.    Also explained that she will need to return to the ER if she develops any new or worsening symptoms of stroke.  Reviewed red flags--headache, speech changes, facial droop, unilateral weakness, or worsening dizziness.  Patient and daughter verbalized understanding of need for emergent evaluation if these occur.    She is also not currently on any blood pressure medication and again prefers to work on healthy lifestyle habits.  She has follow-up with Dr. Gallagher tomorrow.      Follow Up     Return in about 4 weeks (around 12/19/2023).

## 2023-11-22 ENCOUNTER — OFFICE VISIT (OUTPATIENT)
Dept: CARDIOLOGY | Facility: CLINIC | Age: 73
End: 2023-11-22
Payer: MEDICARE

## 2023-11-22 VITALS
HEIGHT: 67 IN | HEART RATE: 90 BPM | DIASTOLIC BLOOD PRESSURE: 78 MMHG | SYSTOLIC BLOOD PRESSURE: 147 MMHG | BODY MASS INDEX: 27.97 KG/M2 | WEIGHT: 178.2 LBS

## 2023-11-22 DIAGNOSIS — I10 HYPERTENSION, ESSENTIAL: Primary | ICD-10-CM

## 2023-11-22 DIAGNOSIS — E78.2 HYPERLIPEMIA, MIXED: ICD-10-CM

## 2023-11-22 PROCEDURE — 3078F DIAST BP <80 MM HG: CPT | Performed by: SPECIALIST

## 2023-11-22 PROCEDURE — 99204 OFFICE O/P NEW MOD 45 MIN: CPT | Performed by: SPECIALIST

## 2023-11-22 PROCEDURE — 3077F SYST BP >= 140 MM HG: CPT | Performed by: SPECIALIST

## 2023-11-22 PROCEDURE — 1160F RVW MEDS BY RX/DR IN RCRD: CPT | Performed by: SPECIALIST

## 2023-11-22 PROCEDURE — 1159F MED LIST DOCD IN RCRD: CPT | Performed by: SPECIALIST

## 2023-11-22 RX ORDER — LOSARTAN POTASSIUM 50 MG/1
50 TABLET ORAL DAILY
Qty: 90 TABLET | Refills: 3 | Status: SHIPPED | OUTPATIENT
Start: 2023-11-22

## 2023-11-22 NOTE — PROGRESS NOTES
[facility]   Cardiology Consult Note    Patient Name: Татьяна Jackson  : 1950  Referring Physician: Leoncio Riddle, *  Subjective   Subjective     Reason for Consult/ Chief Complaint:   Chief Complaint   Patient presents with    Transient Ischemic Attack   Hypertension    HPI:  Татьяна Jackson is a 73 y.o. female with history of hypertension and recent TIA.  No chest pain no palpitations no syncopal or presyncopal episode.    Review of Systems:    Constitutional no fever,  no weight loss   Skin no rash   Otolaryngeal no difficulty swallowing   Cardiovascular See HPI   Pulmonary no cough, no sputum production   Gastrointestinal no constipation, no diarrhea   Genitourinary no dysuria, no hematuria   Hematologic no easy bruisability, no abnormal bleeding   Musculoskeletal no muscle pain   Neurologic no dizziness, no falls       Personal History     Past Medical History:  Past Medical History:   Diagnosis Date    Arthritis     Breast cancer     left breast, diagnosed with breast cancer 2 weeks ago, [facility]    Status post bilateral mastectomy with left sentinel node biopsy 3/9/2022    Ulcerative colitis        Family History:   Family History   Problem Relation Age of Onset    Cancer Sister         gallbladder    Diabetes Mother     Malig Hyperthermia Neg Hx        Social History:  reports that she has never smoked. She has never been exposed to tobacco smoke. She has never used smokeless tobacco. She reports that she does not drink alcohol and does not use drugs.    Home Medications:  Misc Natural Products, Potassium, aspirin, atorvastatin, and cholecalciferol    Allergies:  No Known Allergies    Objective    Objective     Vitals:   Temp:  [96.5 °F (35.8 °C)] 96.5 °F (35.8 °C)  Heart Rate:  [90-91] 90  BP: (147-148)/(78-90) 147/78  Body mass index is 27.91 kg/m².  PHYSICAL EXAM:    General Appearance:   well developed  well nourished  HENT:   oropharynx moist  lips not  cyanotic  Neck:  thyroid not enlarged  supple  Respiratory:  no respiratory distress  normal breath sounds  no rales  Cardiovascular:  no jugular venous distention  regular rhythm  apical impulse normal  S1 normal, S2 normal  no S3, no S4   no murmur  no rub, no thrill  carotid pulses normal; no bruit  pedal pulses normal  lower extremity edema: none    Skin:   warm, dry  Psychiatric:  judgement and insight appropriate  normal mood and affect    RESULTS:    EKG reviewed by me and shows sinus rhythm       Result Review    Result Review:  I have personally reviewed the available results:  [x]  Laboratory  [x]  EKG/Telemetry   [x]  Cardiology/Vascular   [x] Medications  [x]  Old records  Lab Results   Component Value Date    CHOL 248 (H) 11/15/2023    CHOL 264 (H) 10/07/2021     Lab Results   Component Value Date    TRIG 87 11/15/2023    TRIG 106 10/07/2021    TRIG 88 08/27/2019     Lab Results   Component Value Date    HDL 51 11/15/2023    HDL 51 10/07/2021    HDL 62 08/27/2019     Lab Results   Component Value Date     (H) 11/15/2023     (H) 10/07/2021     (H) 08/27/2019     Lab Results   Component Value Date    VLDL 15 11/15/2023    VLDL 19 10/07/2021      @RESUFAST(ALT:3)  Results for orders placed during the hospital encounter of 11/14/23    Adult Transthoracic Echo Complete W/ Cont if Necessary Per Protocol (With Agitated Saline)    Interpretation Summary  Mild left ventricular hypertrophy with normal left ventricular systolic function.  Trace aortic regurgitation.  Trace MR and trace TR.       Procedures     Impression/Plan  1.  Recent TIA: 30-day event monitor to evaluate for any atrial fibrillation.  Continue aspirin 81 mg once a day.  2.  Essential hypertension controlled: Start Cozaar 50 mg once a day.  Monitor blood pressure regularly.  3.  Hyperlipidemia: Continue Lipitor 80 mg once a day.  Monitor lipid and hepatic profile.        Electronically signed by Evaristo Gallagher MD,  11/22/23, 10:46 AM EST.

## 2023-12-28 ENCOUNTER — OFFICE VISIT (OUTPATIENT)
Dept: NEUROLOGY | Facility: CLINIC | Age: 73
End: 2023-12-28
Payer: MEDICARE

## 2023-12-28 VITALS
HEIGHT: 67 IN | DIASTOLIC BLOOD PRESSURE: 95 MMHG | SYSTOLIC BLOOD PRESSURE: 174 MMHG | HEART RATE: 99 BPM | WEIGHT: 181 LBS | BODY MASS INDEX: 28.41 KG/M2

## 2023-12-28 DIAGNOSIS — G45.9 TIA (TRANSIENT ISCHEMIC ATTACK): Primary | ICD-10-CM

## 2023-12-28 RX ORDER — THIAMINE HCL 100 MG
1 TABLET ORAL DAILY
COMMUNITY

## 2023-12-28 RX ORDER — MULTIPLE VITAMINS W/ MINERALS TAB 9MG-400MCG
1 TAB ORAL DAILY
COMMUNITY

## 2023-12-28 RX ORDER — VITAMIN B COMPLEX
1 CAPSULE ORAL DAILY
COMMUNITY

## 2023-12-28 NOTE — PROGRESS NOTES
"Chief Complaint  Neurologic Problem ([facility] ED/ TELEASHLY 11/14/23)    Subjective          Татьяна Jackson is a 73 y.o. female who presents to [facility]  History of Present Illness  73-year-old woman evaluated for possible TIA.  She states that got admitted last month for an episode in which she went to her bathroom and then she cannot control her urine and she stumbled when she got back to her bed.  She was somewhat foggy and disoriented and had problems remembering when she went back to bed around 7 AM that lasted for 5 to 10 minutes.  She was taken to the emergency room and her blood pressure was elevated.  She was admitted for TIA workup which was negative.  She is on aspirin at this time.  She states that it has not recurred.  Sometime last year she went to the store and she started spinning and she had a blackout spell and hit the floor.    She is here today with her daughter.  She lives with her daughter and her .  She is independent with activities of daily living.    Objective   Vital Signs:   /95 (BP Location: Right arm, Patient Position: Sitting, Cuff Size: Adult)   Pulse 99   Ht 170.2 cm (67.01\")   Wt 82.1 kg (181 lb)   BMI 28.34 kg/m²     Physical Exam   She is alert, fluent, phasic, follows commands well.  Optic is a normal, visual fields are full, EMs full directions gaze, facial strength is full, soft palate elevation and tongue normal.  There is no weakness of the upper or lower extremities.  Fine finger movements are intact.  Reflexes are symmetrically decreased in the biceps, triceps absent in the patellar's and ankles.  Cerebellar testing is intact.  Station gait she is able to ambulate independently without assistive device.  Heart is regular rhythm normal in rate.        Assessment and Plan  Diagnoses and all orders for this visit:    1. TIA (transient ischemic attack) (Primary)  Assessment & Plan:  Discussed with her that her symptoms " are more consistent with hypertensive changes rather than TIA although TIA could present with cognitive deficits.  She is to continue taking aspirin on daily basis.  She is to get her blood pressure under control.  I reviewed MRI of the brain which I do not think is clinically significant and I do not think that she has had a stroke.  Thank you for letting me participate in her care.           Total time spent with the patient and coordinating patient care was 35 minutes.    Follow Up  No follow-ups on file.  Patient was given instructions and counseling regarding her condition or for health maintenance advice. Please see specific information pulled into the AVS if appropriate.

## 2023-12-28 NOTE — ASSESSMENT & PLAN NOTE
Discussed with her that her symptoms are more consistent with hypertensive changes rather than TIA although TIA could present with cognitive deficits.  She is to continue taking aspirin on daily basis.  She is to get her blood pressure under control.  I reviewed MRI of the brain which I do not think is clinically significant and I do not think that she has had a stroke.  Thank you for letting me participate in her care.

## 2024-01-11 ENCOUNTER — HOSPITAL ENCOUNTER (OUTPATIENT)
Dept: CARDIOLOGY | Facility: HOSPITAL | Age: 74
Discharge: HOME | End: 2024-01-11
Admitting: INTERNAL MEDICINE
Payer: MEDICARE

## 2024-01-11 DIAGNOSIS — G45.9 TIA (TRANSIENT ISCHEMIC ATTACK): ICD-10-CM

## 2024-01-11 PROCEDURE — 93270 REMOTE 30 DAY ECG REV/REPORT: CPT

## 2024-03-26 ENCOUNTER — OFFICE VISIT (OUTPATIENT)
Dept: CARDIOLOGY | Facility: CLINIC | Age: 74
End: 2024-03-26
Payer: MEDICARE

## 2024-03-26 VITALS
DIASTOLIC BLOOD PRESSURE: 81 MMHG | HEIGHT: 67 IN | SYSTOLIC BLOOD PRESSURE: 152 MMHG | BODY MASS INDEX: 28.56 KG/M2 | HEART RATE: 85 BPM | WEIGHT: 182 LBS

## 2024-03-26 DIAGNOSIS — I10 PRIMARY HYPERTENSION: Primary | Chronic | ICD-10-CM

## 2024-03-26 DIAGNOSIS — I51.7 MILD CONCENTRIC LEFT VENTRICULAR HYPERTROPHY (LVH): ICD-10-CM

## 2024-03-26 PROBLEM — R55 NEAR SYNCOPE: Status: RESOLVED | Noted: 2022-02-08 | Resolved: 2024-03-26

## 2024-03-26 PROBLEM — M25.531 RIGHT WRIST PAIN: Status: RESOLVED | Noted: 2021-10-07 | Resolved: 2024-03-26

## 2024-03-26 PROBLEM — R42 DIZZINESS: Status: RESOLVED | Noted: 2023-08-15 | Resolved: 2024-03-26

## 2024-03-26 PROCEDURE — 99213 OFFICE O/P EST LOW 20 MIN: CPT | Performed by: NURSE PRACTITIONER

## 2024-03-26 PROCEDURE — 3077F SYST BP >= 140 MM HG: CPT | Performed by: NURSE PRACTITIONER

## 2024-03-26 PROCEDURE — 1160F RVW MEDS BY RX/DR IN RCRD: CPT | Performed by: NURSE PRACTITIONER

## 2024-03-26 PROCEDURE — 1159F MED LIST DOCD IN RCRD: CPT | Performed by: NURSE PRACTITIONER

## 2024-03-26 PROCEDURE — 3079F DIAST BP 80-89 MM HG: CPT | Performed by: NURSE PRACTITIONER

## 2024-03-26 NOTE — PROGRESS NOTES
Chief Complaint  Follow-up    Subjective            History of Present Illness  Татьяна Jackson is a 73-year-old female patient who presents to the office today for follow-up.  She was seen by Dr. Gallagher on 11/22/2023 after having a TIA.  At that time a 30-day cardiac event monitor was ordered which showed normal sinus rhythm with average heart rate 81 bpm.  There were no arrhythmias noted.  She has hypertension and mild LVH seen on most recent echocardiogram.  She is not currently prescribed any antihypertensive medications.  She does take aspirin 81 mg daily.    PMH  Past Medical History:   Diagnosis Date    Arthritis     Breast cancer     left breast, diagnosed with breast cancer 2 weeks ago, [facility]    Status post bilateral mastectomy with left sentinel node biopsy 3/9/2022    Ulcerative colitis          ALLERGY  No Known Allergies       SURGICALHX  Past Surgical History:   Procedure Laterality Date    COLONOSCOPY      [place]'t know name of hospital     COLONOSCOPY N/A 3/3/2022    Procedure: COLONOSCOPY WITH BX, POLYPECTOMIES, HOT SNARE;  Surgeon: Margie Dotson MD;  Location: [facility]OSCOPY;  Service: Gastroenterology;  Laterality: N/A;  COLON POLYPS, HEMORRHOIDS    HERNIA REPAIR      UMBILICAL HERNIA REPAIR    MASTECTOMY W/ SENTINEL NODE BIOPSY Bilateral 3/8/2022    Procedure: BREAST MASTECTOMY WITH SENTINEL NODE BIOPSY;  Surgeon: Deborah Guillaume MD;  Location: [facility];  Service: General;  Laterality: Bilateral;    UPPER GASTROINTESTINAL ENDOSCOPY            SOC  Social History     Socioeconomic History    Marital status:    Tobacco Use    Smoking status: Never     Passive exposure: Never    Smokeless tobacco: Never   Vaping Use    Vaping status: Never Used   Substance and Sexual Activity    Alcohol use: Never    Drug use: Never    Sexual activity: Defer         FAMHX  Family History   Problem Relation Age of Onset    Cancer Sister         gallbladder  "   Diabetes Mother     Malig Hyperthermia Neg Hx           MEDSIGONLY  Current Outpatient Medications on File Prior to Visit   Medication Sig    aspirin 81 MG chewable tablet Chew 1 tablet Daily.    B Complex Vitamins (vitamin b complex) capsule capsule Take 1 capsule by mouth Daily.    Calcium 200 MG tablet Take 1 tablet by mouth Daily.    Cholecalciferol 25 MCG (1000 UT) tablet Take 1 tablet by mouth 2 (Two) Times a Day.    Magnesium 500 MG tablet Take 1 tablet by mouth Daily.    Misc Natural Products (YUMVS BEET ROOT-TART CHERRY PO) Take 2 capsules by mouth Daily. Beet root    multivitamin with minerals (MULTIVITAMIN WOMEN 50+ PO) Take 1 tablet by mouth Daily.    Potassium 99 MG tablet Take 1 tablet by mouth 2 (Two) Times a Day.    Zinc Sulfate (ZINC 15 PO) Take 1 tablet by mouth Daily.     No current facility-administered medications on file prior to visit.         Objective   /81   Pulse 85   Ht 170.2 cm (67.01\")   Wt 82.6 kg (182 lb)   BMI 28.50 kg/m²       Physical Exam  HENT:      Head: Normocephalic.   Neck:      Vascular: No carotid bruit.   Cardiovascular:      Rate and Rhythm: Normal rate and regular rhythm.      Pulses: Normal pulses.      Heart sounds: Normal heart sounds. No murmur heard.  Pulmonary:      Effort: Pulmonary effort is normal.      Breath sounds: Normal breath sounds.   Musculoskeletal:      Cervical back: Neck supple.      Right lower leg: No edema.      Left lower leg: No edema.   Skin:     General: Skin is dry.   Neurological:      Mental Status: She is alert and oriented to person, place, and time.   Psychiatric:         Behavior: Behavior normal.         Result Review :   The following data was reviewed by: SHERYL Kaminski on 03/26/2024:  No results found for: \"PROBNP\"      11/15/2023    04:46   CMP   Glucose 102    BUN 10    Creatinine 0.67    EGFR 91.8    Sodium 141    Potassium 3.6    Chloride 106    Calcium 9.0    Total Protein 7.0    Albumin 4.1    Globulin 2.9  " "  Total Bilirubin 0.4    Alkaline Phosphatase 69    AST (SGOT) 16    ALT (SGPT) 10    Albumin/Globulin Ratio 1.4    BUN/Creatinine Ratio 14.9    Anion Gap 10.4      CBC w/diff          11/15/2023    04:46   CBC w/Diff   WBC 8.38    RBC 5.33    Hemoglobin 13.9    Hematocrit 43.4    MCV 81.4    MCH 26.1    MCHC 32.0    RDW 15.2    Platelets 269    Neutrophil Rel % 58.5    Immature Granulocyte Rel % 0.2    Lymphocyte Rel % 28.8    Monocyte Rel % 8.9    Eosinophil Rel % 2.9    Basophil Rel % 0.7       Lab Results   Component Value Date    TSH 1.680 11/14/2023      No results found for: \"FREET4\"   No results found for: \"DDIMERQUANT\"  Magnesium   Date Value Ref Range Status   11/15/2023 2.3 1.6 - 2.4 mg/dL Final      No results found for: \"DIGOXIN\"   Lab Results   Component Value Date    TROPONINT <6 11/14/2023               11/15/2023    04:46   Lipid Panel   Total Cholesterol 248    Triglycerides 87    HDL Cholesterol 51    VLDL Cholesterol 15    LDL Cholesterol  182    LDL/HDL Ratio 3.52        Results for orders placed during the hospital encounter of 11/14/23    Adult Transthoracic Echo Complete W/ Cont if Necessary Per Protocol (With Agitated Saline)    Interpretation Summary  Mild left ventricular hypertrophy with normal left ventricular systolic function.  Trace aortic regurgitation.  Trace MR and trace TR.         Assessment and Plan    Diagnoses and all orders for this visit:    1. Primary hypertension (Primary)  Currently elevated in office, she refuses to take antihypertensive medication due to only seeking holistic therapy.  We talked about low-sodium diet and how that can improve blood pressure.    2. Mild concentric left ventricular hypertrophy (LVH)  Ejection fraction on most recent echocardiogram was 56%, continue to monitor.          Follow Up   Return in about 1 year (around 3/26/2025) for Follow up with Dr Gallagher.    Patient was given instructions and counseling regarding her condition or for health " maintenance advice. Please see specific information pulled into the AVS if appropriate.     Татьяна Dowfleet  reports that she has never smoked. She has never been exposed to tobacco smoke. She has never used smokeless tobacco.           Jennifer Davalos, APRN  03/26/24  15:13 EDT    Dictated Utilizing Dragon Dictation

## 2024-06-19 ENCOUNTER — TELEPHONE (OUTPATIENT)
Dept: ONCOLOGY | Facility: HOSPITAL | Age: 74
End: 2024-06-19
Payer: MEDICARE

## 2024-08-07 ENCOUNTER — TELEPHONE (OUTPATIENT)
Dept: ONCOLOGY | Facility: HOSPITAL | Age: 74
End: 2024-08-07

## 2024-08-07 NOTE — TELEPHONE ENCOUNTER
Caller: Татьяна Jackson    Relationship: Self        What was the call regarding:     CANCELLING APPOINTMENT 08/14 FOLLOW UP , DUE TO [facility] IS CURRENTLY ON LIFE SUPPORT AND HAVING TO MAKE SOME DECISIONS WILL CALL BACK LATER TO RESCHEDULE

## 2025-01-16 ENCOUNTER — OFFICE VISIT (OUTPATIENT)
Dept: INTERNAL MEDICINE | Facility: CLINIC | Age: 75
End: 2025-01-16
Payer: MEDICARE

## 2025-01-16 VITALS
OXYGEN SATURATION: 96 % | SYSTOLIC BLOOD PRESSURE: 128 MMHG | HEIGHT: 67 IN | RESPIRATION RATE: 18 BRPM | HEART RATE: 77 BPM | WEIGHT: 172 LBS | TEMPERATURE: 98.4 F | BODY MASS INDEX: 27 KG/M2 | DIASTOLIC BLOOD PRESSURE: 74 MMHG

## 2025-01-16 DIAGNOSIS — Z00.00 ENCOUNTER FOR ANNUAL WELLNESS EXAM IN MEDICARE PATIENT: Primary | ICD-10-CM

## 2025-01-16 DIAGNOSIS — I10 PRIMARY HYPERTENSION: Chronic | ICD-10-CM

## 2025-01-16 DIAGNOSIS — I89.0 LYMPHEDEMA: ICD-10-CM

## 2025-01-16 DIAGNOSIS — D05.12 INTRADUCTAL PAPILLARY ADENOCARCINOMA OF LEFT BREAST: ICD-10-CM

## 2025-01-16 PROCEDURE — 3074F SYST BP LT 130 MM HG: CPT | Performed by: NURSE PRACTITIONER

## 2025-01-16 PROCEDURE — 1125F AMNT PAIN NOTED PAIN PRSNT: CPT | Performed by: NURSE PRACTITIONER

## 2025-01-16 PROCEDURE — 96160 PT-FOCUSED HLTH RISK ASSMT: CPT | Performed by: NURSE PRACTITIONER

## 2025-01-16 PROCEDURE — G0439 PPPS, SUBSEQ VISIT: HCPCS | Performed by: NURSE PRACTITIONER

## 2025-01-16 PROCEDURE — 3078F DIAST BP <80 MM HG: CPT | Performed by: NURSE PRACTITIONER

## 2025-01-16 PROCEDURE — 99214 OFFICE O/P EST MOD 30 MIN: CPT | Performed by: NURSE PRACTITIONER

## 2025-01-16 NOTE — PROGRESS NOTES
Subjective   The ABCs of the Annual Wellness Visit  Medicare Wellness Visit      Татьяна Jcakson is a 74 y.o. patient who presents for a Medicare Wellness Visit.    The following portions of the patient's history were reviewed and   updated as appropriate: allergies, current medications, past family history, past medical history, past social history, past surgical history, and problem list.    Compared to one year ago, the patient's physical   health is the same.  Compared to one year ago, the patient's mental   health is the same.    Recent Hospitalizations:  She was not admitted to the hospital during the last year.     Current Medical Providers:  Patient Care Team:  Jenni Petersen APRN as PCP - General (Nurse Practitioner)  Deborah Guillaume MD as Consulting Physician (General Surgery)  Jaida Cline MD PhD as Consulting Physician (Hematology and Oncology)    Outpatient Medications Prior to Visit   Medication Sig Dispense Refill    aspirin 81 MG chewable tablet Chew 1 tablet Daily. 30 tablet 0    Magnesium 500 MG tablet Take 1 tablet by mouth Daily.      Misc Natural Products (YUMVS BEET ROOT-TART CHERRY PO) Take 2 capsules by mouth Daily. Beet root      multivitamin with minerals (MULTIVITAMIN WOMEN 50+ PO) Take 1 tablet by mouth Daily.      Potassium 99 MG tablet Take 1 tablet by mouth 2 (Two) Times a Day.      B Complex Vitamins (vitamin b complex) capsule capsule Take 1 capsule by mouth Daily.      Calcium 200 MG tablet Take 1 tablet by mouth Daily. (Patient not taking: Reported on 1/16/2025)      Cholecalciferol 25 MCG (1000 UT) tablet Take 1 tablet by mouth 2 (Two) Times a Day.      Zinc Sulfate (ZINC 15 PO) Take 1 tablet by mouth Daily.       No facility-administered medications prior to visit.     No opioid medication identified on active medication list. I have reviewed chart for other potential  high risk medication/s and harmful drug interactions in the elderly.      Aspirin is on active  "medication list. Aspirin use is indicated based on review of current medical condition/s. Pros and cons of this therapy have been discussed today. Benefits of this medication outweigh potential harm.  Patient has been encouraged to continue taking this medication.  .      Patient Active Problem List   Diagnosis    Ulcerative colitis    Primary hypertension    Overweight (BMI 25.0-29.9)    Arthralgia    Slow transit constipation    Herpes zoster without complication    Intraductal papillary adenocarcinoma of left breast    Hematochezia    Status post bilateral mastectomy with left sentinel node biopsy    TIA (transient ischemic attack)    Mild concentric left ventricular hypertrophy (LVH)     Advance Care Planning Advance Directive is not on file.  ACP discussion was held with the patient during this visit. Patient does not have an advance directive, declines further assistance.            Objective   Vitals:    01/16/25 1504   BP: 128/74   BP Location: Left arm   Patient Position: Sitting   Cuff Size: Adult   Pulse: 77   Resp: 18   Temp: 98.4 °F (36.9 °C)   TempSrc: Temporal   SpO2: 96%   Weight: 78 kg (172 lb)   Height: 170.2 cm (67.01\")       Estimated body mass index is 26.93 kg/m² as calculated from the following:    Height as of this encounter: 170.2 cm (67.01\").    Weight as of this encounter: 78 kg (172 lb).                Does the patient have evidence of cognitive impairment? No                                                                                                Health  Risk Assessment    Smoking Status:  Social History     Tobacco Use   Smoking Status Never    Passive exposure: Never   Smokeless Tobacco Never     Alcohol Consumption:  Social History     Substance and Sexual Activity   Alcohol Use Never       Fall Risk Screen  STEADI Fall Risk Assessment was completed, and patient is at LOW risk for falls.Assessment completed on:1/16/2025    Depression Screening   Little interest or pleasure in " doing things? Not at all   Feeling down, depressed, or hopeless? Not at all   PHQ-2 Total Score 0      Health Habits and Functional and Cognitive Screenin/14/2025    10:00 AM   Functional & Cognitive Status   Do you have difficulty preparing food and eating? No    Do you have difficulty bathing yourself, getting dressed or grooming yourself? No    Do you have difficulty using the toilet? No    Do you have difficulty moving around from place to place? No    Do you have trouble with steps or getting out of a bed or a chair? No    Current Diet Well Balanced Diet    Dental Exam Up to date    Eye Exam Not up to date    Exercise (times per week) 5 times per week    Current Exercises Include Walking    Do you need help using the phone?  No    Are you deaf or do you have serious difficulty hearing?  No    Do you need help to go to places out of walking distance? No    Do you need help shopping? No    Do you need help preparing meals?  No    Do you need help with housework?  No    Do you need help with laundry? No    Do you need help taking your medications? No    Do you need help managing money? No    Do you ever drive or ride in a car without wearing a seat belt? No    Have you felt unusual stress, anger or loneliness in the last month? No    Who do you live with? Child    If you need help, do you have trouble finding someone available to you? No    Have you been bothered in the last four weeks by sexual problems? No    Do you have difficulty concentrating, remembering or making decisions? No        Patient-reported           Age-appropriate Screening Schedule:  Refer to the list below for future screening recommendations based on patient's age, sex and/or medical conditions. Orders for these recommended tests are listed in the plan section. The patient has been provided with a written plan.    Health Maintenance List  Health Maintenance   Topic Date Due    HEPATITIS C SCREENING  Never done    DXA SCAN  2024     COLORECTAL CANCER SCREENING  03/03/2024    COVID-19 Vaccine (1 - 2024-25 season) Never done    LIPID PANEL  11/15/2024    ZOSTER VACCINE (1 of 2) 01/16/2025 (Originally 6/19/1999)    INFLUENZA VACCINE  03/31/2025 (Originally 7/1/2024)    RSV Vaccine - Adults (1 - 1-dose 75+ series) 01/16/2026 (Originally 6/19/2024)    Pneumococcal Vaccine 65+ (1 of 1 - PCV) 01/16/2026 (Originally 6/19/2014)    TDAP/TD VACCINES (1 - Tdap) 01/16/2026 (Originally 6/19/1969)    ANNUAL WELLNESS VISIT  01/16/2026    BMI FOLLOWUP  01/16/2026    MAMMOGRAM  Discontinued                                                                                                                                                CMS Preventative Services Quick Reference  Risk Factors Identified During Encounter  Immunizations Discussed/Encouraged: Influenza, Prevnar 20 (Pneumococcal 20-valent conjugate), Shingrix, and RSV (Respiratory Syncytial Virus)    The above risks/problems have been discussed with the patient.  Pertinent information has been shared with the patient in the After Visit Summary.  An After Visit Summary and PPPS were made available to the patient.    Follow Up:   Next Medicare Wellness visit to be scheduled in 1 year.         Additional E&M Note during same encounter follows:  Patient has additional, significant, and separately identifiable condition(s)/problem(s) that require work above and beyond the Medicare Wellness Visit     Chief Complaint  Medicare Wellness-subsequent (Needing new referrals for oncology and lymphedema doctor)    Subjective   HPI  Татьяна is also being seen today for additional medical problem/s.        History of Present Illness  The patient is a 74-year-old female presenting for her Medicare annual wellness visit and follow-up for lymphedema management post-breast carcinoma.    The patient manages her lymphedema with the use of compression garments, which she finds effective from morning until night. She has not  "had any hospital admissions in the past year. She expresses uncertainty regarding the necessity of continued follow-ups with her surgeon and oncologist.    She has experienced bruising on her arm and a sensation of head heaviness, which she attributes to excessive vitamin intake. After discontinuing the vitamins, the bruising resolved except for one nearly healed spot.    She denies experiencing chest pain, headaches, or dizziness.  Blood pressure has been well controlled.                Objective   Vital Signs:  /74 (BP Location: Left arm, Patient Position: Sitting, Cuff Size: Adult)   Pulse 77   Temp 98.4 °F (36.9 °C) (Temporal)   Resp 18   Ht 170.2 cm (67.01\")   Wt 78 kg (172 lb)   SpO2 96%   BMI 26.93 kg/m²   Physical Exam  Vitals and nursing note reviewed.   Constitutional:       General: She is not in acute distress.     Appearance: Normal appearance.   HENT:      Head: Normocephalic and atraumatic.      Right Ear: External ear normal.      Left Ear: External ear normal.      Nose: Nose normal.      Mouth/Throat:      Mouth: Mucous membranes are moist.   Eyes:      Conjunctiva/sclera: Conjunctivae normal.   Cardiovascular:      Rate and Rhythm: Normal rate and regular rhythm.      Pulses: Normal pulses.      Heart sounds: Normal heart sounds. No murmur heard.     No friction rub. No gallop.   Pulmonary:      Effort: Pulmonary effort is normal. No respiratory distress.      Breath sounds: No wheezing, rhonchi or rales.   Musculoskeletal:      Cervical back: Neck supple.      Right lower leg: No edema.      Left lower leg: No edema.   Skin:     General: Skin is warm and dry.   Neurological:      General: No focal deficit present.      Mental Status: She is alert and oriented to person, place, and time.   Psychiatric:         Mood and Affect: Mood normal.         Behavior: Behavior normal.                       Assessment and Plan            Encounter for annual wellness exam in Medicare " patient    Orders:    Comprehensive Metabolic Panel    CBC & Differential    TSH    Lipid Panel    Intraductal papillary adenocarcinoma of left breast    Orders:    Ambulatory Referral to Oncology    Ambulatory Referral to Lymphedema Clinic    Primary hypertension           Lymphedema           Assessment & Plan  1. Medicare annual wellness visit  - BP within normal range  - Ordered comprehensive labs  - Declined pneumonia vaccine  - Provided information on living will and power of , attached to [employer]    2. Lymphedema  - Minimal arm swelling managed with compression garments  - Continue follow-ups with oncologist and lymphedema specialist    3. HTN  - well controlled with lifestyle modification    4. Bruising  - Attributed to excessive vitamins, resolved after discontinuation     Patient or patient representative verbalized consent for the use of Ambient Listening during the visit with  SHERYL Tracey for chart documentation. 1/16/2025  15:48 EST        Follow Up   Return in about 1 year (around 1/16/2026).  Patient was given instructions and counseling regarding her condition or for health maintenance advice. Please see specific information pulled into the AVS if appropriate.

## 2025-02-07 ENCOUNTER — LAB (OUTPATIENT)
Dept: LAB | Facility: HOSPITAL | Age: 75
End: 2025-02-07
Payer: MEDICARE

## 2025-02-07 LAB
ALBUMIN SERPL-MCNC: 4.4 G/DL (ref 3.5–5.2)
ALBUMIN/GLOB SERPL: 1.3 G/DL
ALP SERPL-CCNC: 82 U/L (ref 39–117)
ALT SERPL W P-5'-P-CCNC: 13 U/L (ref 1–33)
ANION GAP SERPL CALCULATED.3IONS-SCNC: 11 MMOL/L (ref 5–15)
AST SERPL-CCNC: 23 U/L (ref 1–32)
BASOPHILS # BLD AUTO: 0.06 10*3/MM3 (ref 0–0.2)
BASOPHILS NFR BLD AUTO: 0.8 % (ref 0–1.5)
BILIRUB SERPL-MCNC: 0.2 MG/DL (ref 0–1.2)
BUN SERPL-MCNC: 9 MG/DL (ref 8–23)
BUN/CREAT SERPL: 10.2 (ref 7–25)
CALCIUM SPEC-SCNC: 9.7 MG/DL (ref 8.6–10.5)
CHLORIDE SERPL-SCNC: 106 MMOL/L (ref 98–107)
CHOLEST SERPL-MCNC: 263 MG/DL (ref 0–200)
CO2 SERPL-SCNC: 25 MMOL/L (ref 22–29)
CREAT SERPL-MCNC: 0.88 MG/DL (ref 0.57–1)
DEPRECATED RDW RBC AUTO: 43.4 FL (ref 37–54)
EGFRCR SERPLBLD CKD-EPI 2021: 68.6 ML/MIN/1.73
EOSINOPHIL # BLD AUTO: 0.22 10*3/MM3 (ref 0–0.4)
EOSINOPHIL NFR BLD AUTO: 3 % (ref 0.3–6.2)
ERYTHROCYTE [DISTWIDTH] IN BLOOD BY AUTOMATED COUNT: 14.6 % (ref 12.3–15.4)
GLOBULIN UR ELPH-MCNC: 3.3 GM/DL
GLUCOSE SERPL-MCNC: 91 MG/DL (ref 65–99)
HCT VFR BLD AUTO: 43.4 % (ref 34–46.6)
HDLC SERPL-MCNC: 53 MG/DL (ref 40–60)
HGB BLD-MCNC: 14.4 G/DL (ref 12–15.9)
IMM GRANULOCYTES # BLD AUTO: 0.02 10*3/MM3 (ref 0–0.05)
IMM GRANULOCYTES NFR BLD AUTO: 0.3 % (ref 0–0.5)
LDLC SERPL CALC-MCNC: 177 MG/DL (ref 0–100)
LDLC/HDLC SERPL: 3.3 {RATIO}
LYMPHOCYTES # BLD AUTO: 3.28 10*3/MM3 (ref 0.7–3.1)
LYMPHOCYTES NFR BLD AUTO: 44.1 % (ref 19.6–45.3)
MCH RBC QN AUTO: 27.7 PG (ref 26.6–33)
MCHC RBC AUTO-ENTMCNC: 33.2 G/DL (ref 31.5–35.7)
MCV RBC AUTO: 83.5 FL (ref 79–97)
MONOCYTES # BLD AUTO: 0.52 10*3/MM3 (ref 0.1–0.9)
MONOCYTES NFR BLD AUTO: 7 % (ref 5–12)
NEUTROPHILS NFR BLD AUTO: 3.34 10*3/MM3 (ref 1.7–7)
NEUTROPHILS NFR BLD AUTO: 44.8 % (ref 42.7–76)
NRBC BLD AUTO-RTO: 0 /100 WBC (ref 0–0.2)
PLATELET # BLD AUTO: 280 10*3/MM3 (ref 140–450)
PMV BLD AUTO: 12 FL (ref 6–12)
POTASSIUM SERPL-SCNC: 3.6 MMOL/L (ref 3.5–5.2)
PROT SERPL-MCNC: 7.7 G/DL (ref 6–8.5)
RBC # BLD AUTO: 5.2 10*6/MM3 (ref 3.77–5.28)
SODIUM SERPL-SCNC: 142 MMOL/L (ref 136–145)
TRIGL SERPL-MCNC: 176 MG/DL (ref 0–150)
TSH SERPL DL<=0.05 MIU/L-ACNC: 1.85 UIU/ML (ref 0.27–4.2)
VLDLC SERPL-MCNC: 33 MG/DL (ref 5–40)
WBC NRBC COR # BLD AUTO: 7.44 10*3/MM3 (ref 3.4–10.8)

## 2025-02-07 PROCEDURE — 80053 COMPREHEN METABOLIC PANEL: CPT | Performed by: NURSE PRACTITIONER

## 2025-02-07 PROCEDURE — 80061 LIPID PANEL: CPT | Performed by: NURSE PRACTITIONER

## 2025-02-07 PROCEDURE — 85025 COMPLETE CBC W/AUTO DIFF WBC: CPT | Performed by: NURSE PRACTITIONER

## 2025-02-07 PROCEDURE — 84443 ASSAY THYROID STIM HORMONE: CPT | Performed by: NURSE PRACTITIONER

## 2025-02-13 ENCOUNTER — OFFICE VISIT (OUTPATIENT)
Dept: ONCOLOGY | Facility: HOSPITAL | Age: 75
End: 2025-02-13
Payer: MEDICARE

## 2025-02-13 VITALS
RESPIRATION RATE: 18 BRPM | SYSTOLIC BLOOD PRESSURE: 184 MMHG | HEIGHT: 67 IN | DIASTOLIC BLOOD PRESSURE: 101 MMHG | HEART RATE: 84 BPM | TEMPERATURE: 96.3 F | OXYGEN SATURATION: 97 % | BODY MASS INDEX: 26.87 KG/M2 | WEIGHT: 171.2 LBS

## 2025-02-13 DIAGNOSIS — Z90.13 STATUS POST BILATERAL MASTECTOMY: ICD-10-CM

## 2025-02-13 DIAGNOSIS — N60.91 ATYPICAL DUCTAL HYPERPLASIA OF RIGHT BREAST: ICD-10-CM

## 2025-02-13 DIAGNOSIS — R03.0 ELEVATED BLOOD PRESSURE READING: ICD-10-CM

## 2025-02-13 DIAGNOSIS — D05.12 INTRADUCTAL PAPILLARY ADENOCARCINOMA OF LEFT BREAST: Primary | ICD-10-CM

## 2025-02-13 PROCEDURE — G0463 HOSPITAL OUTPT CLINIC VISIT: HCPCS | Performed by: NURSE PRACTITIONER

## 2025-02-13 NOTE — PROGRESS NOTES
Chief Complaint/Reason for Referral:  Breast cancer    Jenni Petersen, Jenni Morua, APRN    Records Obtained:  Records of the patients history including those obtained from  [facility] and patient information were reviewed and summarized in detail.    Subjective    History of Present Illness  Ms. Татьяна Jackson presents with her daughter today for follow up for breast cancer.   Seen previously by Dr. Cline last in October of 2023. History of the bilateral mastectomies. Opted against the use of endocrine therapy. Not having any current issues with headache. Reports has ongoing arthraligia's.   She follows with Jenni Petersen.     BP is elevated in clinic today. Does not take any BP meds. Denies any headaches, CP, palpitations.     Recent lab work done on 2/7/2025: CBC is normal. CMP is normal.     Cancer Staging   No matching staging information was found for the patient.       Treatment intent: curative    Oncology/Hematology History Overview Note     Left Papillary Breast Cancer:   - Diagnostic mammogram 1/22/22: A 4cm mass with possible satellite nodules at 2:00, 8cm from the nipple.  Some lymph nodes in the left axilla also show cortical thickening.    - core biopsy of the right breast on 1/31/22: fragments of papillary breast cancer, ER+ (95%), AR+ (10%), HER2 untested  - Bilateral Mastectomy 3/8/22: Left breast with encapsulated papillary carcinoma, grade 1-2 with microscopic foci of extracapsular invasion, ER positive (100%), AR positive (60%), HER-2 negative (score 0), Ki-67 15%. Right breast with ADH and intraductal papilloma  - Patient declined oncotype testing and decline treatment with adjuvant endocrine therapy    Liver abnormality:  - low density foci in the liver noted on breast MRI.  The pt reports she has known about this 25 years and it is unchanged.       Intraductal papillary adenocarcinoma of left breast   2/8/2022 Initial Diagnosis    Intraductal papillary adenocarcinoma of left breast          Review of Systems   Constitutional: Negative.    HENT: Negative.     Eyes: Negative.    Respiratory: Negative.     Cardiovascular: Negative.    Gastrointestinal: Negative.    Endocrine: Negative.    Genitourinary: Negative.    Allergic/Immunologic: Negative.    Neurological: Negative.    Hematological: Negative.    Psychiatric/Behavioral: Negative.         Current Outpatient Medications on File Prior to Visit   Medication Sig Dispense Refill    aspirin 81 MG chewable tablet Chew 1 tablet Daily. 30 tablet 0    Magnesium 500 MG tablet Take 1 tablet by mouth Daily.      Misc Natural Products (YUMVS BEET ROOT-TART CHERRY PO) Take 2 capsules by mouth Daily. Beet root      multivitamin with minerals (MULTIVITAMIN WOMEN 50+ PO) Take 1 tablet by mouth Daily.      Potassium 99 MG tablet Take 1 tablet by mouth 2 (Two) Times a Day.       No current facility-administered medications on file prior to visit.       No Known Allergies  Past Medical History:   Diagnosis Date    Arthritis     Breast cancer     left breast, diagnosed with breast cancer 2 weeks ago, [facility]    Status post bilateral mastectomy with left sentinel node biopsy 3/9/2022    Ulcerative colitis      Past Surgical History:   Procedure Laterality Date    COLONOSCOPY      [place]'t know name of hospital     COLONOSCOPY N/A 3/3/2022    Procedure: COLONOSCOPY WITH BX, POLYPECTOMIES, HOT SNARE;  Surgeon: Margie Dotson MD;  Location: [facility] ENDOSCOPY;  Service: Gastroenterology;  Laterality: N/A;  COLON POLYPS, HEMORRHOIDS    HERNIA REPAIR      UMBILICAL HERNIA REPAIR    MASTECTOMY W/ SENTINEL NODE BIOPSY Bilateral 3/8/2022    Procedure: BREAST MASTECTOMY WITH SENTINEL NODE BIOPSY;  Surgeon: Deborah Guillaume MD;  Location: [facility] [place] [facility];  Service: General;  Laterality: Bilateral;    UPPER GASTROINTESTINAL ENDOSCOPY       Social History     Socioeconomic History    Marital status:    Tobacco Use    Smoking status:  "Never     Passive exposure: Never    Smokeless tobacco: Never   Vaping Use    Vaping status: Never Used   Substance and Sexual Activity    Alcohol use: Never    Drug use: Never    Sexual activity: Defer     Family History   Problem Relation Age of Onset    Cancer Sister         gallbladder    Diabetes Mother     Malig Hyperthermia Neg Hx        There is no immunization history on file for this patient.    Tobacco Use: Low Risk  (2/13/2025)    Patient History     Smoking Tobacco Use: Never     Smokeless Tobacco Use: Never     Passive Exposure: Never       Objective     Physical Exam  Vitals and nursing note reviewed.   Constitutional:       Appearance: Normal appearance.   HENT:      Head: Normocephalic.      Nose: Nose normal.      Mouth/Throat:      Mouth: Mucous membranes are moist.   Eyes:      Extraocular Movements: Extraocular movements intact.   Cardiovascular:      Rate and Rhythm: Normal rate and regular rhythm.      Pulses: Normal pulses.      Heart sounds: Normal heart sounds. No murmur heard.  Pulmonary:      Effort: Pulmonary effort is normal. No respiratory distress.      Breath sounds: Normal breath sounds.   Abdominal:      Palpations: Abdomen is soft.   Musculoskeletal:         General: Normal range of motion.      Cervical back: Normal range of motion and neck supple.   Skin:     General: Skin is warm and dry.   Neurological:      General: No focal deficit present.      Mental Status: She is oriented to person, place, and time.   Psychiatric:         Mood and Affect: Mood normal.         Behavior: Behavior normal.         Thought Content: Thought content normal.         Judgment: Judgment normal.         Vitals:    02/13/25 1529   BP: (!) 184/101   Pulse: 84   Resp: 18   Temp: 96.3 °F (35.7 °C)   TempSrc: Temporal   SpO2: 97%   Weight: 77.7 kg (171 lb 3.2 oz)   Height: 170.2 cm (67.01\")   PainSc: 0-No pain       Wt Readings from Last 3 Encounters:   02/13/25 77.7 kg (171 lb 3.2 oz)   01/16/25 78 kg " "(172 lb)   08/15/24 76.5 kg (168 lb 11.2 oz)        ECOG score: 0         ECOG: (0) Fully Active - Able to Carry On All Pre-disease Performance Without Restriction  Fall Risk Assessment was completed, and patient is at low risk for falls.  PHQ-9 Total Score:         The patient is  experiencing fatigue. Fatigue score: 1    PT/OT Functional Screening: PT fx screen : No needs identified  Speech Functional Screening: Speech fx screen : No needs identified  Rehab to be ordered: Rehab to be ordered : No needs identified        Result Review :  The following data was reviewed by: SHERYL Sterling on 02/13/2025:  Lab Results   Component Value Date    HGB 14.4 02/07/2025    HCT 43.4 02/07/2025    MCV 83.5 02/07/2025     02/07/2025    WBC 7.44 02/07/2025    NEUTROABS 3.34 02/07/2025    LYMPHSABS 3.28 (H) 02/07/2025    MONOSABS 0.52 02/07/2025    EOSABS 0.22 02/07/2025    BASOSABS 0.06 02/07/2025     Lab Results   Component Value Date    GLUCOSE 91 02/07/2025    BUN 9 02/07/2025    CREATININE 0.88 02/07/2025     02/07/2025    K 3.6 02/07/2025     02/07/2025    CO2 25.0 02/07/2025    CALCIUM 9.7 02/07/2025    PROTEINTOT 7.7 02/07/2025    ALBUMIN 4.4 02/07/2025    BILITOT 0.2 02/07/2025    ALKPHOS 82 02/07/2025    AST 23 02/07/2025    ALT 13 02/07/2025     No results found for: \"LDH\", \"FERRITIN\", \"FOLATE\"  No results found for: \"IRON\", \"LABIRON\", \"TRANSFERRIN\", \"TIBC\"  No results found for: \"LDH\", \"FERRITIN\", \"[id number]\", \"FOLATE\"  No results found for: \"PSA\", \"CEA\", \"AFP\", \"\", \"\"    No Images in the past 120 days found..         Assessment and Plan:  Diagnoses and all orders for this visit:    1. Intraductal papillary adenocarcinoma of left breast (Primary)  -     CBC & Differential; Future  -     Comprehensive Metabolic Panel; Future    2. Status post bilateral mastectomy with left sentinel node biopsy    3. Atypical ductal hyperplasia of right breast    4. Elevated blood pressure " reading    --ER/[place]+ Left Papillary Breast Cancer: s/p Bilateral mastectomy. Pt declined adjuvant endocrine therapy.  She is doing well in the interim. I will f/u with her in 1 year.    --Right atypical ductal hyperplasia:  Completed bilateral mastectomies.     --Elevated blood pressure: 184/101: denies any Headaches currently. Denies CP or heart palpitations. BP in January in PCP office of 128/74. Not taking any BP meds currently. Recommend she follow up with PCP for BP monitoring. She reports she checks her BP at home as well.     Repeat labs in 1 year and follow up in 1 year with NP.            I spent 20 minutes caring for Татьяна on this date of service. This time includes time spent by me in the following activities:preparing for the visit, reviewing tests, obtaining and/or reviewing a separately obtained history, performing a medically appropriate examination and/or evaluation , counseling and educating the patient/family/caregiver, ordering medications, tests, or procedures, referring and communicating with other health care professionals , documenting information in the medical record, and independently interpreting results and communicating that information with the patient/family/caregiver    Patient Follow Up: 1 year.     Patient was given instructions and counseling regarding her condition or for health maintenance advice. Please see specific information pulled into the AVS if appropriate.     Alycia Dubon, APRN    2/13/2025    .tob

## 2025-02-26 ENCOUNTER — TELEPHONE (OUTPATIENT)
Dept: INTERNAL MEDICINE | Facility: CLINIC | Age: 75
End: 2025-02-26

## 2025-02-26 NOTE — TELEPHONE ENCOUNTER
Caller: [facility] [employer]    Relationship to patient: Other    Best call back number: [phone]     Patient is needing: CALLER STATED THAT THIS INFORMATION HAS BEEN FAXED TO THE OFFICE AND HE IS NEEDING TO VERIFY THAT IN HOME MONITORING OF THE PATIENT'S BLOOD PRESSURE LOG HAS BEEN RECEIVED THROUGH THEIR EHR SYSTEM TO YASEMIN FENTON FOR HER TO REVIEW.

## (undated) DEVICE — BLAKE SILICONE DRAIN, 19 FR ROUND, HUBLESS WITH 1/4" TROCAR: Brand: BLAKE

## (undated) DEVICE — BIOPATCH™ ANTIMICROBIAL DRESSING WITH CHLORHEXIDINE GLUCONATE IS A HYDROPHILLIC POLYURETHANE ABSORPTIVE FOAM WITH CHLORHEXIDINE GLUCONATE (CHG) WHICH INHIBITS BACTERIAL GROWTH UNDER THE DRESSING. THE DRESSING IS INTENDED TO BE USED TO ABSORB EXUDATE, COVER A WOUND CAUSED BY VASCULAR AND NONVASCULAR PERCUTANEOUS MEDICAL DEVICES DURING SURGERY, AS WELL AS REDUCE LOCAL INFECTION AND COLONIZATION OF MICROORGANISMS.: Brand: BIOPATCH

## (undated) DEVICE — NON-WOVEN ADHESIVE WOUND DRESSING: Brand: PRIMAPORE ADHESIVE DRESSING 10*8CM

## (undated) DEVICE — MINOR-LF: Brand: MEDLINE INDUSTRIES, INC.

## (undated) DEVICE — STRIP CLS WND SUTURESTRIP/PLS 0.5X4IN TP1103

## (undated) DEVICE — PENCL E/S SMOKEEVAC W/TELESCP CANN

## (undated) DEVICE — ANTIBACTERIAL UNDYED BRAIDED (POLYGLACTIN 910), SYNTHETIC ABSORBABLE SUTURE: Brand: COATED VICRYL

## (undated) DEVICE — Device: Brand: DEFENDO AIR/WATER/SUCTION AND BIOPSY VALVE

## (undated) DEVICE — THE SINGLE USE ETRAP – POLYP TRAP IS USED FOR SUCTION RETRIEVAL OF ENDOSCOPICALLY REMOVED POLYPS.: Brand: ETRAP

## (undated) DEVICE — JACKSON-PRATT 100CC BULB RESERVOIR: Brand: CARDINAL HEALTH

## (undated) DEVICE — SLV SCD KN/LEN ADJ EXPRSS BLENDED MD 1P/U

## (undated) DEVICE — DRSNG SURG AQUACEL AG 9X25CM

## (undated) DEVICE — INTENDED FOR TISSUE SEPARATION, AND OTHER PROCEDURES THAT REQUIRE A SHARP SURGICAL BLADE TO PUNCTURE OR CUT.: Brand: BARD-PARKER ® CARBON RIB-BACK BLADES

## (undated) DEVICE — SUT PERMAHAND SILK 0 FSL 30IN BLK

## (undated) DEVICE — VIOLET BRAIDED (POLYGLACTIN 910), SYNTHETIC ABSORBABLE SUTURE: Brand: COATED VICRYL

## (undated) DEVICE — SINGLE-USE BIOPSY FORCEPS: Brand: RADIAL JAW 4

## (undated) DEVICE — ELECTRD BLD EDGE/INSUL1P 2.4X5.1MM STRL

## (undated) DEVICE — STERILE POLYISOPRENE POWDER-FREE SURGICAL GLOVES WITH EMOLLIENT COATING: Brand: PROTEXIS

## (undated) DEVICE — CVR PROB 96IN LF STRL

## (undated) DEVICE — ANTIBACTERIAL VIOLET BRAIDED (POLYGLACTIN 910), SYNTHETIC ABSORBABLE SUTURE: Brand: COATED VICRYL

## (undated) DEVICE — PAD GRND E/S W/CORD SPLT A/

## (undated) DEVICE — MAJOR-LF: Brand: MEDLINE INDUSTRIES, INC.

## (undated) DEVICE — SNAR POLYP CAPTIFLEX XS/OVL 11X2.4MM 240CM 1P/U

## (undated) DEVICE — DEV OPN LIGASURE DISSCT EXACT 40DEG 21.6MM BX/1

## (undated) DEVICE — SOL IRRG H2O PL/BG 1000ML STRL

## (undated) DEVICE — SNAR E/S POLYP SNAREMASTER OVL/10MM 2.8X2300MM YEL

## (undated) DEVICE — GOWN,REINFORCE,POLY,SIRUS,BREATH SLV,XLG: Brand: MEDLINE

## (undated) DEVICE — COLON KIT: Brand: MEDLINE INDUSTRIES, INC.

## (undated) DEVICE — APPL CHLORAPREP HI/LITE 26ML ORNG

## (undated) DEVICE — ADHS LIQ MASTISOL 2/3ML

## (undated) DEVICE — CONTAINER,SPECIMEN,O.R.STRL,4.5OZ: Brand: MEDLINE